# Patient Record
Sex: FEMALE | Race: WHITE | NOT HISPANIC OR LATINO | Employment: OTHER | URBAN - METROPOLITAN AREA
[De-identification: names, ages, dates, MRNs, and addresses within clinical notes are randomized per-mention and may not be internally consistent; named-entity substitution may affect disease eponyms.]

---

## 2017-06-23 ENCOUNTER — ALLSCRIPTS OFFICE VISIT (OUTPATIENT)
Dept: OTHER | Facility: OTHER | Age: 42
End: 2017-06-23

## 2017-06-23 DIAGNOSIS — Z12.31 ENCOUNTER FOR SCREENING MAMMOGRAM FOR MALIGNANT NEOPLASM OF BREAST: ICD-10-CM

## 2017-07-24 ENCOUNTER — ALLSCRIPTS OFFICE VISIT (OUTPATIENT)
Dept: OTHER | Facility: OTHER | Age: 42
End: 2017-07-24

## 2017-07-24 ENCOUNTER — TRANSCRIBE ORDERS (OUTPATIENT)
Dept: ADMINISTRATIVE | Facility: HOSPITAL | Age: 42
End: 2017-07-24

## 2017-07-24 DIAGNOSIS — N63.0 LUMP, BREAST: Primary | ICD-10-CM

## 2017-07-24 DIAGNOSIS — N63.0 BREAST LUMP: ICD-10-CM

## 2017-07-24 DIAGNOSIS — N60.19 DIFFUSE CYSTIC MASTOPATHY OF BREAST: ICD-10-CM

## 2017-07-25 ENCOUNTER — HOSPITAL ENCOUNTER (OUTPATIENT)
Dept: MAMMOGRAPHY | Facility: CLINIC | Age: 42
Discharge: HOME | End: 2017-07-25
Payer: COMMERCIAL

## 2017-07-25 ENCOUNTER — HOSPITAL ENCOUNTER (OUTPATIENT)
Dept: ULTRASOUND IMAGING | Facility: CLINIC | Age: 42
Discharge: HOME | End: 2017-07-25
Payer: COMMERCIAL

## 2017-07-25 DIAGNOSIS — N63.0 LUMP, BREAST: ICD-10-CM

## 2017-07-25 PROCEDURE — 76642 ULTRASOUND BREAST LIMITED: CPT

## 2017-07-25 PROCEDURE — G0279 TOMOSYNTHESIS, MAMMO: HCPCS

## 2017-07-25 PROCEDURE — G0206 DX MAMMO INCL CAD UNI: HCPCS

## 2017-07-26 ENCOUNTER — GENERIC CONVERSION - ENCOUNTER (OUTPATIENT)
Dept: OTHER | Facility: OTHER | Age: 42
End: 2017-07-26

## 2017-08-18 ENCOUNTER — ALLSCRIPTS OFFICE VISIT (OUTPATIENT)
Dept: OTHER | Facility: OTHER | Age: 42
End: 2017-08-18

## 2017-09-18 ENCOUNTER — GENERIC CONVERSION - ENCOUNTER (OUTPATIENT)
Dept: OTHER | Facility: OTHER | Age: 42
End: 2017-09-18

## 2017-10-18 ENCOUNTER — GENERIC CONVERSION - ENCOUNTER (OUTPATIENT)
Dept: OTHER | Facility: OTHER | Age: 42
End: 2017-10-18

## 2017-10-19 ENCOUNTER — GENERIC CONVERSION - ENCOUNTER (OUTPATIENT)
Dept: OTHER | Facility: OTHER | Age: 42
End: 2017-10-19

## 2017-10-25 ENCOUNTER — GENERIC CONVERSION - ENCOUNTER (OUTPATIENT)
Dept: OTHER | Facility: OTHER | Age: 42
End: 2017-10-25

## 2017-10-27 ENCOUNTER — HOSPITAL ENCOUNTER (OUTPATIENT)
Dept: MAMMOGRAPHY | Facility: CLINIC | Age: 42
Discharge: HOME | End: 2017-10-27
Payer: COMMERCIAL

## 2017-10-27 ENCOUNTER — HOSPITAL ENCOUNTER (OUTPATIENT)
Dept: ULTRASOUND IMAGING | Facility: CLINIC | Age: 42
Discharge: HOME | End: 2017-10-27
Payer: COMMERCIAL

## 2017-10-27 DIAGNOSIS — Z12.31 ENCOUNTER FOR SCREENING MAMMOGRAM FOR MALIGNANT NEOPLASM OF BREAST: ICD-10-CM

## 2017-10-27 DIAGNOSIS — N60.19 DIFFUSE CYSTIC MASTOPATHY OF BREAST: ICD-10-CM

## 2017-10-27 PROCEDURE — 77063 BREAST TOMOSYNTHESIS BI: CPT

## 2017-10-27 PROCEDURE — 76641 ULTRASOUND BREAST COMPLETE: CPT

## 2017-10-27 PROCEDURE — G0202 SCR MAMMO BI INCL CAD: HCPCS

## 2017-10-27 PROCEDURE — 76377 3D RENDER W/INTRP POSTPROCES: CPT

## 2017-10-30 ENCOUNTER — GENERIC CONVERSION - ENCOUNTER (OUTPATIENT)
Dept: OTHER | Facility: OTHER | Age: 42
End: 2017-10-30

## 2018-01-09 NOTE — MISCELLANEOUS
Message   Recorded as Task   Date: 10/03/2016 03:40 PM, Created By: Bryan Romero   Task Name: Follow Up   Assigned To: Juan Manuel England   Regarding Patient: Gracy Canseco, Status: Active   CommentShahram Larkin - 03 Oct 2016 3:40 PM     TASK CREATED  Caller: Leslie Vance; [phone]  [facility] imag[facility] called - they need a new rx put in for mammo screening bilateral 3D and the screening dx of z12 31 please fax to [phone] - 03 Oct 2016 3:50 PM     TASK EDITED                 order in allscripts        Active Problems    1  Abnormal mammogram (793 80) (R92 8)   2  Dense breasts (793 82) (R92 2)   3  Encounter for gynecological examination with abnormal finding (V72 31) (Z01 411)   4  Encounter for routine gynecological examination (V72 31) (Z01 419)   5  Encounter for screening mammogram for malignant neoplasm of breast (V76 12)   (Z12 31)   6  Metrorrhagia (626 6) (N92 1)   7  Screening for human papillomavirus (HPV) (V73 81) (Z11 51)   8  Urinary tract infection associated with catheterization of urinary tract, initial encounter   9  Urinary tract infection without hematuria, site unspecified (599 0) (N39 0)   10  Vaginal discharge (623 5) (N89 8)    Current Meds   1  CeleXA TABS (Citalopram Hydrobromide); Therapy: (Recorded:2013) to Recorded    Allergies    1  No Known Drug Allergies    Plan  Encounter for screening mammogram for malignant neoplasm of breast    · MAMMO SCREENING BILATERAL W 3D & CAD; Status:Hold For -  Scheduling,Retrospective By Protocol Authorization;  Requested [id number];     Signatures   Electronically signed by : Aquiles Singh, ; Oct  3 2016  3:50PM EST                       (Author)

## 2018-01-10 NOTE — MISCELLANEOUS
Message   Recorded as Task   Date: 10/30/2017 09:49 AM, Created By: Alexandra Madrid   Task Name: Follow Up   Assigned To: Cyrus Pichardo   Regarding Patient: Zack Reyes, Status: Active   CommentPeashanti Roberson - 30 Oct 2017 9:49 AM     TASK CREATED  inform pt right breast cyst smaller than previous   mammo wnl   Helga Lomas - 30 Oct 2017 11:04 AM     TASK EDITED  Patient is aware of mammo results  Stated they did drain the cyst on Friday but didn't get enough to analyze  She is doing fine now, no pain or discomfort  Active Problems    1  Abnormal mammogram (793 80) (R92 8)   2  Breast mass, right (611 72) (N63 10)   3  Dense breast tissue (793 82) (R92 2)   4  Dense breasts (793 82) (R92 2)   5  Encounter for gynecological examination with abnormal finding (V72 31) (Z01 411)   6  Encounter for routine gynecological examination (V72 31) (Z01 419)   7  Encounter for screening mammogram for malignant neoplasm of breast (V76 12)   (Z12 31)   8  Fibrocystic breast (610 1) (N60 19)   9  Metrorrhagia (626 6) (N92 1)   10  Screening for human papillomavirus (HPV) (V73 81) (Z11 51)   11  Urinary tract infection associated with catheterization of urinary tract, initial encounter    (996 64,599 0) (T83 511A,N39 0)   12  Urinary tract infection without hematuria, site unspecified (599 0) (N39 0)   13  Vaginal discharge (623 5) (N89 8)    Current Meds   1  CeleXA TABS (Citalopram Hydrobromide); Therapy: (Recorded:11Jun2013) to Recorded    Allergies    1   No Known Drug Allergies    Signatures   Electronically signed by : Shonna Devine, ; Oct 30 2017 11:04AM EST                       (Author)

## 2018-01-12 NOTE — MISCELLANEOUS
Message   Recorded as Task   Date: 09/30/2016 09:35 AM, Created By: Mike Byrnes   Task Name: Hospital Call   Assigned To: Jojo Melendez   Regarding Patient: Blayne Ramsey, Status: In Progress   Sidra Haylie - 30 Sep 2016 9:35 AM     TASK CREATED  Caller: andres, Other; Hospital Call; [phone]  needs 3d mammo diagnosis changed to screening code z12 31       [phone] fax   Mike Byrnes - 30 Sep 2016 2:37 PM     TASK REASSIGNED: Previously Assigned To Chaparro Carlos - 30 Sep 2016 2:58 PM     TASK IN [address] [facility] [facility] - 30 Sep 2016 3:08 PM     TASK EDITED                 changed code  according to zachary kapadia called and stated it should have a screening code  Active Problems    1  Abnormal mammogram (793 80) (R92 8)   2  Dense breasts (793 82) (R92 2)   3  Encounter for gynecological examination with abnormal finding (V72 31) (Z01 411)   4  Encounter for routine gynecological examination (V72 31) (Z01 419)   5  Encounter for screening mammogram for malignant neoplasm of breast (V76 12)   (Z12 31)   6  Metrorrhagia (626 6) (N92 1)   7  Screening for human papillomavirus (HPV) (V73 81) (Z11 51)   8  Urinary tract infection associated with catheterization of urinary tract, initial encounter   9  Urinary tract infection without hematuria, site unspecified (599 0) (N39 0)   10  Vaginal discharge (623 5) (N89 8)    Current Meds   1  CeleXA TABS (Citalopram Hydrobromide); Therapy: (Recorded:11Jun2013) to Recorded    Allergies    1  No Known Drug Allergies    Plan  Abnormal mammogram    · MAMMO DIAGNOSTIC LEFT W CAD; Status:Canceled - Retrospective By Protocol  Authorization;   Encounter for screening mammogram for malignant neoplasm of breast    · MAMMO DIAGNOSTIC LEFT W 3D & CAD; Status:Active - Retrospective By Protocol  Authorization;  Requested ONE:[id number];     Signatures   Electronically signed by : Alia Ceballos LPN; Sep 30 1898  2:39XH EST (Author)

## 2018-01-13 VITALS
SYSTOLIC BLOOD PRESSURE: 138 MMHG | BODY MASS INDEX: 25.34 KG/M2 | HEIGHT: 63 IN | DIASTOLIC BLOOD PRESSURE: 84 MMHG | WEIGHT: 143 LBS

## 2018-01-13 NOTE — MISCELLANEOUS
Message   Recorded as Task   Date: 09/18/2017 08:38 AM, Created By: Manda Preciado   Task Name: Follow Up   Assigned To: Elio Mtz   Regarding Patient: Gracy Canseco, Status: In Progress   Genaro Harris - 18 Sep 2017 8:38 AM     TASK CREATED  inform pt breast cyst contained only fluid   no abnormal cells noted   Nell Lomassten - 18 Sep 2017 8:44 AM     TASK IN PROGRESS   Helga Lomas - 18 Sep 2017 8:45 AM     TASK EDITED  Patient is aware of Breast cyst results  Active Problems    1  Abnormal mammogram (793 80) (R92 8)   2  Breast mass, right (611 72) (N63)   3  Dense breasts (793 82) (R92 2)   4  Encounter for gynecological examination with abnormal finding (V72 31) (Z01 411)   5  Encounter for routine gynecological examination (V72 31) (Z01 419)   6  Encounter for screening mammogram for malignant neoplasm of breast (V76 12)   (Z12 31)   7  Fibrocystic breast (610 1) (N60 19)   8  Metrorrhagia (626 6) (N92 1)   9  Screening for human papillomavirus (HPV) (V73 81) (Z11 51)   10  Urinary tract infection associated with catheterization of urinary tract, initial encounter    (996 64,599 0) (T83 511A,N39 0)   11  Urinary tract infection without hematuria, site unspecified (599 0) (N39 0)   12  Vaginal discharge (623 5) (N89 8)    Current Meds   1  CeleXA TABS (Citalopram Hydrobromide); Therapy: (Recorded:11Jun2013) to Recorded    Allergies    1   No Known Drug Allergies    Signatures   Electronically signed by : Laura Payne, ; Sep 18 2017  8:45AM EST                       (Author)

## 2018-01-14 VITALS
SYSTOLIC BLOOD PRESSURE: 128 MMHG | WEIGHT: 144.8 LBS | DIASTOLIC BLOOD PRESSURE: 74 MMHG | BODY MASS INDEX: 25.66 KG/M2 | HEIGHT: 63 IN

## 2018-01-15 VITALS
DIASTOLIC BLOOD PRESSURE: 72 MMHG | BODY MASS INDEX: 25.83 KG/M2 | SYSTOLIC BLOOD PRESSURE: 114 MMHG | HEIGHT: 63 IN | WEIGHT: 145.8 LBS

## 2018-01-15 NOTE — MISCELLANEOUS
Message   Recorded as Task   Date: 08/01/2017 01:12 PM, Created By: Arturo Head   Task Name: Care Coordination   Assigned To: Beni Nicole   Regarding Patient: Severa Linea, Status: In Progress   Comment:    Nancy Squires - 01 Aug 2017 1:12 PM     TASK CREATED  Caller: Self; Care Coordination; [phone] (Home)  pt was seen last wk by Dr Joel Oglesby pd her $160 00 copay  Her ins  is telling her  that every other doctor in our practice is covered except for Dr Jhonatan Guzman  ??pls call @ [phone]  Samir Tabor - 01 Aug 2017 5:12 PM     TASK EDITED  pt called again for update - she's concerned due to she's due in for a OVL with Jhonatan Guzman for draining of the cyst - insurance told her we owe her the balance of the last visit - she said that they WILL now pay the 6/23 visit (before they told her it was all her to pay the 205 due to Jhonatan Guzman out of network) - she also mentioned the Mammo he sent her for - for the lump - she has to pay $500 due to coded incorrectly (as per her insurance) - explained that this wasn't her routine mammo & the code was for the issue she has  Candida Pan - 04 Aug 2017 2:33 PM     TASK EDITED  Spoke to patient today she is aware that we are working on the credentialing aspect with this insurance  I have advised her to keep her upcoming appt  I will continue to have [facility] reach out to credentialing dept and I will reach out to billing to have claims resubmitted once credentialing issue is done  Shena Goodman - 14 Aug 2017 11:27 AM     TASK IN PROGRESS   Candida Pan - 18 Aug 2017 10:09 AM     TASK EDITED  Spoke to patient today and she is aware that they will process as no coverage out of network  Spoke to [facility] as well and this is a credentialing issue and they are working on resolving  Let patient know to call me each time she receives a bill so that we can tell them to hold off sending to collections      Do not charge her copay as this is a high copay and it gets prorated  recheck this in a few weeks   Shena Goodman - 13 Sep 2017 4:55 PM     TASK IN PROGRESS   DanieljimHugoEan - 15 Sep 2017 1:23 PM     TASK EDITED  Patient called today and explained that her insurance company sent her a letter stating that she may owe less on her mammo bill  They processed claim incorrectly  Called billing Soledad Alicea) to make sure this does not go to collections and she informed me that if insurance is working on it she will be fine and she is not in danger of going to collections for two months yet  Shena Goodman - 15 Sep 2017 1:24 PM     TASK EDITED  account [de-identified]   KędzialirioKateesteban - 13 Oct 2017 1:57 PM     TASK EDITED  Can she wait until January instead of September then it will be covered in full?  need to ask Dr Wood July - 17 Oct 2017 8:14 AM     TASK EDITED  Copay is all that is left for the diagnostic portion   135 00   Shena Goodman - 17 Oct 2017 12:01 PM     TASK EDITED  Patient advised and did receive that bill  Dr Susana Arteaga also advised not waiting on the Mammogram   She then had a question regarding both breasts or just the Left as she just had the right done and it was normal   She is questioning because she does not want to get a bill for it if she already had it done and insurance denies it  Can they do a screening on just one side? Shena Goodman - 17 Oct 2017 12:03 PM     TASK REASSIGNED: Previously Assigned To Shena Goodman  see task below regarding Mammo and Dr Susana Arteaga    KęJoan - 17 Oct 2017 12:59 PM     TASK REASSIGNED: Previously Assigned To SLOGA GYN,Team  Recommendation is Elyce Primrose - 19 Oct 2017 2:52 PM     TASK EDITED  Would someone be able to call her and let her know that Dr Susana Arteaga said The recommendation is for both breasts  [facility] Just in case she has any questions as to why  She seems to have a lot of questions     Helga Lomas - 19 Oct 2017 3:06 PM     TASK EDITED  Patient is aware that she needs to have both breast scanned - she was [facility] by [facility]  She understands  Active Problems    1  Abnormal mammogram (793 80) (R92 8)   2  Breast mass, right (611 72) (N63 10)   3  Dense breasts (793 82) (R92 2)   4  Encounter for gynecological examination with abnormal finding (V72 31) (Z01 411)   5  Encounter for routine gynecological examination (V72 31) (Z01 419)   6  Encounter for screening mammogram for malignant neoplasm of breast (V76 12)   (Z12 31)   7  Fibrocystic breast (610 1) (N60 19)   8  Metrorrhagia (626 6) (N92 1)   9  Screening for human papillomavirus (HPV) (V73 81) (Z11 51)   10  Urinary tract infection associated with catheterization of urinary tract, initial encounter    (996 64,599 0) (T83 511A,N39 0)   11  Urinary tract infection without hematuria, site unspecified (599 0) (N39 0)   12  Vaginal discharge (623 5) (N89 8)    Current Meds   1  CeleXA TABS (Citalopram Hydrobromide); Therapy: (Recorded:62Sxo0034) to Recorded    Allergies    1   No Known Drug Allergies    Signatures   Electronically signed by : Darcy Wang, ; Oct 19 2017  3:07PM EST                       (Author)

## 2018-01-15 NOTE — MISCELLANEOUS
Message  Reviewed breast u/s with pt  Offered aspiration  Friday August 18 at [facility]  Results/Data     *US BREAST RIGHT LIMITED (DIAGNOSTIC)   US BREAST RIGHT LIMITED: Patient History:   Family history of prostate cancer at age 61 in father, unknown    cancer at age 48 in paternal aunt  Took hormonal contraceptives for 2 years  Patient has never smoked  Patient's BMI is 22 3  Reason for exam: clinical finding  Mammo Diagnostic Right W DBT and CAD: July 25, 2017 - Check In #:   [de-identified]   2D/3D Procedure   3D views: MLO view(s) were taken of the right breast    2D views: CC and MLO view(s) were taken of the right breast        Technologist: MICHAEL Malloy (MICHAEL)(M)   Prior study comparison: September 30, 2016, mammo diagnostic left   W CAD performed at [facility]  September 29, 2016, mammo screening bilateral W DBT and CAD, performed at    [place] [address]  September 28, 2015,    bilateral [address]  [facility] digtl scrn mammo w/CAD, performed at [address]  The breast tissue is heterogeneously dense, potentially limiting    the sensitivity of mammography  Patient risk, included in this    report, assists in determining the appropriate screening regimen    (such as 3-D mammography or the inclusion of automated breast    ultrasound or MRI)  3-D mammography may also remain indicated as    screening  US Breast Right Limited: July 25, 2017 - Check In #: [de-identified]   Standard views  Technologist: Cheyenne Molina RDMS   Heterogeneity can be either focal or diffuse  The breast    echotexture is characterized by multiple small areas of increased   and decreased echogenicity  Shadowing may occur at the    interfaces of the fat lobules and parenchyma  This pattern occurs   in younger breasts and those with heterogeneously dense    parenchyma depicted mammographically    Prior to Imaging, a    radiopaque marker was applied to the skin of the upper outer    right breast in the area of palpable abnormality, as indicated by   the patient  There is a circumscribed 3 5 cm nodule in the upper outer right    breast, 8 cm from the nipple, in the area of palpable    abnormality  This was seen on prior studies, but appears    slightly larger at this time  The parenchymal pattern is otherwise stable  There are no    suspicious microcalcifications  There is no evidence of    architectural distortion or skin thickening  The right axillary    region is benign  Following diagnostic mammography, the patient was taken to the    ultrasound suite  RIGHT BREAST ULTRASOUND:     Grayscale sonography of the upper outer right breast in the area    of palpable abnormality was performed in multiple projections    using real time imaging  At the 10 o'clock position, 17 cm from the nipple, there is an    anechoic well-defined nodule, measuring 3 1 x 1 6 x 2 5 cm  There is posterior enhancement with no internal vascularity and    the appearance is consistent with a simple cyst   This    corresponds in size, shape and location to the nodule seen on    mammography  This cyst was identified in 2015, at which time it    measured 2 5 x 2 6 x 1 cm  No solid nodules are identified  1   In the area of palpable abnormality, a simple cyst is    identified which requires no further follow-up  2   Stable right mammogram    3   Management of any clinical symptoms and findings must be    based on clinical grounds  4   The patient is due for her bilateral mammogram in September 2017  ACR BI-RADSÃ¯Â¾Â® Assessments: BiRad:2 - Benign (Overall)   Right breast Right Diag Mamm: BiRad:2 - benign finding in the    right breast    Right breast Right Brst US: BiRad:2 - benign finding in the right   breast      Recommendation:   Return to routine screening mammogram schedule  The patient is scheduled in a reminder system  Transcription Location: MICHAEL Harris 98: [id number]     Risk Value(s):   Tyrer-Cuzick 10 Year: 1 500%, Tyrer-Cuzick Lifetime: 11 100%,    Myriad Table: 1 5%, SAVANNAH 5 Year: 0 6%, NCI Lifetime: 10 1%   Signed by:   Te Paiz MD   7/25/17     Signatures   Electronically signed by : Shanti Rosado DO; Jul 26 2017  4:52PM EST                       (Author)

## 2018-01-16 NOTE — MISCELLANEOUS
Message  Pt has f/u u/s and mammo of both breasts this week  Not feeling any masses  Will await results        Signatures   Electronically signed by : Amelia Peterson DO; Oct 25 2017  2:48PM EST                       (Author)

## 2018-01-17 NOTE — MISCELLANEOUS
Message   Recorded as Task   Date: 10/17/2017 04:48 PM, Created By: Dwight Harman   Task Name: Follow Up   Assigned To: Matthew Pace   Regarding Patient: Nathanael House, Status: In Progress   Pamela Pan - 17 Oct 2017 4:48 PM     TASK CREATED  Caller: Self; General Medical Question; [phone] (Home)  Pt asked to speak w you regarding mammo of L breast   Pt states that her ins only covers 1 mammo per year and she wants to know if this can wait   or is it medically necessary  Harrietta December - 12 Oct 2017 1:27 PM     TASK REASSIGNED: Previously Assigned To Nobleta December  can you call this pt? I am not sure what to tell her     thanks   Shona Dear - 18 Oct 2017 2:01 PM     TASK [address] - 18 Oct 2017 2:16 PM     TASK EDITED  Pt told ins should pay for bilat screening mammo  Pt to look into abus - entirely different u/s  Not a diag u/s  Pt referred to [place] if more questions  Pt does have dense breasts        Active Problems    1  Abnormal mammogram (793 80) (R92 8)   2  Breast mass, right (611 72) (N63 10)   3  Dense breasts (793 82) (R92 2)   4  Encounter for gynecological examination with abnormal finding (V72 31) (Z01 411)   5  Encounter for routine gynecological examination (V72 31) (Z01 419)   6  Encounter for screening mammogram for malignant neoplasm of breast (V76 12)   (Z12 31)   7  Fibrocystic breast (610 1) (N60 19)   8  Metrorrhagia (626 6) (N92 1)   9  Screening for human papillomavirus (HPV) (V73 81) (Z11 51)   10  Urinary tract infection associated with catheterization of urinary tract, initial encounter    (996 64,599 0) (T83 511A,N39 0)   11  Urinary tract infection without hematuria, site unspecified (599 0) (N39 0)   12  Vaginal discharge (623 5) (N89 8)    Current Meds   1  CeleXA TABS (Citalopram Hydrobromide); Therapy: (Recorded:11Jun2013) to Recorded    Allergies    1   No Known Drug Allergies    Signatures   Electronically signed by Patrick Mcdonald, ; Oct 18 2017  2:17PM EST                       (Author)

## 2018-02-16 ENCOUNTER — TELEPHONE (OUTPATIENT)
Dept: OBGYN CLINIC | Facility: CLINIC | Age: 43
End: 2018-02-16

## 2018-02-16 NOTE — TELEPHONE ENCOUNTER
Pt left a message in regards to a bill she received for a mammo bill stated she has out of network benefits dr Geraldine Hartmann is not participating please advise

## 2018-03-22 NOTE — TELEPHONE ENCOUNTER
Spoke with Andrea Milligan in [employer], insurance has a $917 copay for diagnostic mammograms   I am waiting for patient to call to discuss further

## 2018-04-10 NOTE — TELEPHONE ENCOUNTER
Spoke with patient, reviewed copay information, she understands and will wait to speak with Chadd Gaming about other charges

## 2018-04-10 NOTE — TELEPHONE ENCOUNTER
Also If you could relay this information to her, and I am still working on her other billing problems with the credentialing    This is a true copay

## 2018-07-09 ENCOUNTER — ANNUAL EXAM (OUTPATIENT)
Dept: OBGYN CLINIC | Facility: CLINIC | Age: 43
End: 2018-07-09
Payer: COMMERCIAL

## 2018-07-09 VITALS
SYSTOLIC BLOOD PRESSURE: 96 MMHG | BODY MASS INDEX: 24.45 KG/M2 | DIASTOLIC BLOOD PRESSURE: 62 MMHG | HEIGHT: 63 IN | WEIGHT: 138 LBS

## 2018-07-09 DIAGNOSIS — N60.01 BILATERAL BREAST CYSTS: ICD-10-CM

## 2018-07-09 DIAGNOSIS — Z12.31 ENCOUNTER FOR SCREENING MAMMOGRAM FOR MALIGNANT NEOPLASM OF BREAST: ICD-10-CM

## 2018-07-09 DIAGNOSIS — N60.02 BILATERAL BREAST CYSTS: ICD-10-CM

## 2018-07-09 DIAGNOSIS — Z01.419 ENCOUNTER FOR GYNECOLOGICAL EXAMINATION WITHOUT ABNORMAL FINDING: Primary | ICD-10-CM

## 2018-07-09 PROCEDURE — S0612 ANNUAL GYNECOLOGICAL EXAMINA: HCPCS | Performed by: OBSTETRICS & GYNECOLOGY

## 2018-07-09 RX ORDER — DOXYCYCLINE HYCLATE 50 MG/1
324 CAPSULE, GELATIN COATED ORAL
COMMUNITY
End: 2019-04-07

## 2018-07-09 RX ORDER — ARIPIPRAZOLE 2 MG/1
2 TABLET ORAL DAILY
Refills: 1 | COMMUNITY
Start: 2018-06-15 | End: 2019-01-18 | Stop reason: ALTCHOICE

## 2018-07-09 RX ORDER — CITALOPRAM 40 MG/1
40 TABLET ORAL
Refills: 1 | COMMUNITY
Start: 2018-06-02

## 2018-07-09 NOTE — PROGRESS NOTES
Assessment/Plan:    No problem-specific Assessment & Plan notes found for this encounter  Diagnoses and all orders for this visit:    Encounter for screening mammogram for malignant neoplasm of breast  -     Mammo screening bilateral w 3d & cad; Future    Bilateral breast cysts  -     US breast screening bilateral complete (ABUS); Future    Encounter for gynecological examination without abnormal finding    Other orders  -     ABILIFY 2 MG tablet; Take 2 mg by mouth daily  -     citalopram (CeleXA) 40 mg tablet; Take 40 mg by mouth daily  -     ferrous gluconate (FERGON) 324 mg tablet; Take 324 mg by mouth daily with breakfast        Annual examination was completed  3D mammography as well as bilateral breast ultrasound were ordered given her previous cystic breast changes  Patient will utilize Unisom and melatonin for her sleep habits  I have asked her to call if she has not seen benefit in this next month as I will gladly prescribed Ambien to help  I have suggested that she utilize naproxen prior to the onset of menses to help decrease flow  Patient otherwise return to the office in 1 year or as necessary  Subjective:      Patient ID: Jeet Andrade is a 43 y o  female  Patient returns for annual gyn visit  She has no specific new medical surgical issues  She continues with some slightly heavy periods  She is also bothered by some insomnia  She had recent tragedy in a relationship and that her fiance  from metastatic melanoma 2 and half months ago  With the exception of insomnia she appears to be recovering well  Gynecologic Exam         The following portions of the patient's history were reviewed and updated as appropriate:   She  has no past medical history on file    She   Patient Active Problem List    Diagnosis Date Noted    Encounter for screening mammogram for malignant neoplasm of breast 2018    Bilateral breast cysts 2018     She  has no past surgical history on file  Her family history is not on file  She  reports that she has never smoked  She has never used smokeless tobacco  She reports that she drinks alcohol  She reports that she does not use drugs  Current Outpatient Prescriptions   Medication Sig Dispense Refill    ferrous gluconate (FERGON) 324 mg tablet Take 324 mg by mouth daily with breakfast      ABILIFY 2 MG tablet Take 2 mg by mouth daily  1    citalopram (CeleXA) 40 mg tablet Take 40 mg by mouth daily  1     No current facility-administered medications for this visit  No current outpatient prescriptions on file prior to visit  No current facility-administered medications on file prior to visit  She has No Known Allergies       Review of Systems   All other systems reviewed and are negative  Objective:      BP 96/62 (BP Location: Left arm, Patient Position: Sitting, Cuff Size: Standard)   Ht 5' 2 5" (1 588 m)   Wt 62 6 kg (138 lb)   LMP 07/05/2018   BMI 24 84 kg/m²          Physical Exam   Constitutional: She is oriented to person, place, and time  She appears well-developed and well-nourished  HENT:   Head: Normocephalic and atraumatic  Nose: Nose normal    Eyes: EOM are normal  Pupils are equal, round, and reactive to light  Neck: Normal range of motion  Neck supple  No thyromegaly present  Cardiovascular: Normal rate and regular rhythm  Pulmonary/Chest: Effort normal and breath sounds normal  No respiratory distress  Breasts no masses, tenderness, skin changes; stable cyst at 9-10 on right   Abdominal: Soft  Bowel sounds are normal  She exhibits no distension and no mass  There is no tenderness  Hernia confirmed negative in the right inguinal area and confirmed negative in the left inguinal area  Genitourinary: Vagina normal and uterus normal  No breast swelling, tenderness, discharge or bleeding  Pelvic exam was performed with patient supine  No labial fusion   There is no rash, tenderness, lesion or injury on the right labia  There is no rash, tenderness, lesion or injury on the left labia  Cervix exhibits no motion tenderness, no discharge and no friability  Genitourinary Comments: Ext genitalia nl female w/o lesions  Vag healthy without lesions or discharge  Cervix healthy w/o lesions or discharge  uterus nl size, NT, no mass  Adnexa NT, no mass   Musculoskeletal: Normal range of motion  She exhibits no edema  Lymphadenopathy:        Right: No inguinal adenopathy present  Left: No inguinal adenopathy present  Neurological: She is alert and oriented to person, place, and time  She has normal reflexes  Skin: Skin is warm and dry  No rash noted  Psychiatric: She has a normal mood and affect  Her behavior is normal  Thought content normal    Nursing note and vitals reviewed

## 2018-07-25 ENCOUNTER — TELEPHONE (OUTPATIENT)
Dept: OBGYN CLINIC | Facility: CLINIC | Age: 43
End: 2018-07-25

## 2018-07-25 DIAGNOSIS — F51.01 PRIMARY INSOMNIA: Primary | ICD-10-CM

## 2018-07-25 RX ORDER — ZOLPIDEM TARTRATE 5 MG/1
5 TABLET ORAL
Qty: 30 TABLET | Refills: 1 | Status: SHIPPED | OUTPATIENT
Start: 2018-07-25 | End: 2019-04-07

## 2018-07-25 NOTE — TELEPHONE ENCOUNTER
She saw Dr Juanito Hdz a month ago and spoke to him regarding medication for anxiety and sleep and told her to call back if not working and he would speak to her  She would like him to call her

## 2018-07-25 NOTE — TELEPHONE ENCOUNTER
Dear Dr Thorne Comment:    I apologize with regards to mentioning psychotropic medication, please disregard  Pt was recommended to take Unisom and Melatonin at her last visit with you on 7/9/18  Pt states she tried both medications, however, it did not relieve her insomnia  Pt further states that if said medications did not work, you would prescribe Ambien  Pt requests to speak with you directly  Please advise  Thank you!     Maribel Cornejo MA

## 2018-07-25 NOTE — TELEPHONE ENCOUNTER
Dear Dr Prakash Shah:    Pt called office today complaining that prescribed psychotropic medication is not working for her  Pt was prescribed Abilify 2 mg on 6/15/18  Pt states she is still unable to sleep after her fiancee's death  Pt further states she wants to speak with you directly  Pt can be reached @ [phone]  Pt is aware that she may receive a response form you tomorrow  Pt denies suicidal ideations at this time  Please advise  Thank you!     Prince Jose MA

## 2018-07-25 NOTE — PROGRESS NOTES
Spoke w/ pt  Persists w/ insomnia  Feels worse with this since starting Abilify  Desires ambien  [address] sent  Will wean Abilify qod for next month  To call if no change next 10 days

## 2018-07-25 NOTE — TELEPHONE ENCOUNTER
I did not prescribe her meds  It would be best for her to speak with the prescribing doctor  I have a full day in L&D tomorrow

## 2018-08-27 ENCOUNTER — TELEPHONE (OUTPATIENT)
Dept: OBGYN CLINIC | Facility: CLINIC | Age: 43
End: 2018-08-27

## 2018-08-28 NOTE — TELEPHONE ENCOUNTER
I received a call from father of patient Terry Quinonez  He is not on her consent form  He was asking to speak with Dr Marianela Maldonado  He stated that she is a bit of a mess right now over an upcoming mammogram  He says she is a bit of a hypochondriac  She is stressing about the mammogram which is scheduled in 2 months  He wants her to come in for a breast exam so you can tell her that everything is fine  I told him there are currently no openings for this  He says this would help with her current situation  He thinks she needs to see a psychologist or  and is asking for a recommendation  He also states that she has a very high deductible that is hard to pay  $7,500  I told him you are not in the office today but we will get back to [facility] when you respond  He says you have called her in the past  He says she works as a  and has not showered  He is concerned about her   Please advise

## 2018-08-29 ENCOUNTER — TELEPHONE (OUTPATIENT)
Dept: OBGYN CLINIC | Facility: CLINIC | Age: 43
End: 2018-08-29

## 2018-08-29 NOTE — TELEPHONE ENCOUNTER
UBALDO CALLED STATING THAT SHE HAS BEEN CALLING TO SPEAK WITH DR Brittany Callahan    SAYS SHE NEEDS TO SPEAK WITH HIM TO HAVE HER MIND SET   SHE WOULD LIKE TO SEE HIM, TOLD HER HE DOESN'T HAVE AVAILABILITY UNTIL October, SAID SHE CAN'T WAIT THAT LONG    WANTED TO SEE A NURSE    TOLD HER THEY DO NOT EXAMINE PATIENTS    SHE STATES SHE IS STRESSED AND THAT DR Brittany Callahan KNOWS HOW SHE CAN GET AND DR Brittany Callahan TOLD HER TO CALL HIM ANYTIME SHE NEEDED TO AND HE WOULD SPEAK WITH HER    PLEASE ADVISE

## 2018-09-05 ENCOUNTER — TELEPHONE (OUTPATIENT)
Dept: OBGYN CLINIC | Facility: CLINIC | Age: 43
End: 2018-09-05

## 2018-09-05 NOTE — TELEPHONE ENCOUNTER
Patient cancelled appt for today,states child sick  I offered to reschedule appt due patient c/o breast pain, but she stated she is fine now

## 2018-09-20 ENCOUNTER — TELEPHONE (OUTPATIENT)
Dept: OBGYN CLINIC | Facility: CLINIC | Age: 43
End: 2018-09-20

## 2018-09-20 NOTE — TELEPHONE ENCOUNTER
Spoke with pt ins wont cover her abus test for screening   Advised pt to go get her jessy done and if indicated rk will order a diag us of breast if need be

## 2018-09-25 ENCOUNTER — TELEPHONE (OUTPATIENT)
Dept: OBGYN CLINIC | Facility: CLINIC | Age: 43
End: 2018-09-25

## 2018-09-25 DIAGNOSIS — R92.2 DENSE BREAST TISSUE: Primary | ICD-10-CM

## 2019-01-17 ENCOUNTER — TELEPHONE (OUTPATIENT)
Dept: OBGYN CLINIC | Facility: CLINIC | Age: 44
End: 2019-01-17

## 2019-01-18 ENCOUNTER — TRANSCRIBE ORDERS (OUTPATIENT)
Dept: RADIOLOGY | Facility: CLINIC | Age: 44
End: 2019-01-18

## 2019-01-18 ENCOUNTER — HOSPITAL ENCOUNTER (OUTPATIENT)
Dept: ULTRASOUND IMAGING | Facility: CLINIC | Age: 44
Discharge: HOME | End: 2019-01-18
Payer: COMMERCIAL

## 2019-01-18 ENCOUNTER — OFFICE VISIT (OUTPATIENT)
Dept: OBGYN CLINIC | Facility: MEDICAL CENTER | Age: 44
End: 2019-01-18
Payer: COMMERCIAL

## 2019-01-18 VITALS — WEIGHT: 145 LBS | BODY MASS INDEX: 26.1 KG/M2 | DIASTOLIC BLOOD PRESSURE: 80 MMHG | SYSTOLIC BLOOD PRESSURE: 120 MMHG

## 2019-01-18 DIAGNOSIS — R10.2 PELVIC PAIN: ICD-10-CM

## 2019-01-18 DIAGNOSIS — R10.2 PELVIC PAIN: Primary | ICD-10-CM

## 2019-01-18 PROCEDURE — 99213 OFFICE O/P EST LOW 20 MIN: CPT | Performed by: PHYSICIAN ASSISTANT

## 2019-01-18 PROCEDURE — 76830 TRANSVAGINAL US NON-OB: CPT

## 2019-01-18 PROCEDURE — 76856 US EXAM PELVIC COMPLETE: CPT

## 2019-01-18 RX ORDER — BUPROPION HYDROCHLORIDE 300 MG/1
TABLET ORAL
COMMUNITY
Start: 2019-01-16 | End: 2020-09-21 | Stop reason: ALTCHOICE

## 2019-01-18 NOTE — PROGRESS NOTES
Assessment/Plan:    Pelvic pain  Will check imaging   Suspect likely d/t scar tissue/adhesions - discussed potential tx options including pain management or surgical intervention  Will await imaging results to determine how pt wishes to proceed       Diagnoses and all orders for this visit:    Pelvic pain  -     US pelvis complete w transvaginal; Future    Other orders  -     buPROPion (WELLBUTRIN XL) 300 mg 24 hr tablet;         Subjective:      Patient ID: Ken Bronson is a 37 y o  female  Pt presents for eval of pelvic/abd pain  States that last week, started w/ pain along  incision site  H/o  10 yrs ago, no complications   States pain feels like a 'burning' pain - made worse w/ IC  No irreg bleeding, vag d/c   Sometimes notes she feels pain in LLQ - feels may be assoc w/ ovulation  No menstrual irregularities, cycles approx q33 days  Monogamous w/ , reports no risk of STDs          The following portions of the patient's history were reviewed and updated as appropriate: allergies, current medications, past family history, past medical history, past social history, past surgical history and problem list     Review of Systems   Constitutional: Negative for appetite change, fatigue and unexpected weight change  Respiratory: Negative for chest tightness and shortness of breath  Cardiovascular: Negative for chest pain, palpitations and leg swelling  Gastrointestinal: Negative for abdominal pain, constipation, diarrhea, nausea and vomiting  Genitourinary: Positive for pelvic pain  Negative for difficulty urinating, dyspareunia, menstrual problem and vaginal discharge  Musculoskeletal: Negative for arthralgias and myalgias  Neurological: Negative for dizziness, light-headedness and headaches  Psychiatric/Behavioral: Negative for dysphoric mood  The patient is not nervous/anxious  All other systems reviewed and are negative          Objective:      /80   Wt 65 8 kg (145 lb)   LMP 12/25/2018   Breastfeeding? No   BMI 26 10 kg/m²          Physical Exam   Constitutional: She is oriented to person, place, and time  She appears well-developed and well-nourished  HENT:   Head: Normocephalic and atraumatic  Abdominal: Soft  There is no tenderness  Hernia confirmed negative in the right inguinal area and confirmed negative in the left inguinal area  Genitourinary: Vagina normal and uterus normal  Pelvic exam was performed with patient supine  There is no rash, tenderness, lesion or injury on the right labia  There is no rash, tenderness, lesion or injury on the left labia  Cervix exhibits no motion tenderness, no discharge and no friability  Right adnexum displays no mass, no tenderness and no fullness  Left adnexum displays no mass, no tenderness and no fullness  No erythema, tenderness or bleeding in the vagina  No signs of injury around the vagina  No vaginal discharge found  Neurological: She is alert and oriented to person, place, and time  Skin: Skin is warm and dry  Psychiatric: She has a normal mood and affect  Nursing note and vitals reviewed

## 2019-01-18 NOTE — ASSESSMENT & PLAN NOTE
Will check imaging   Suspect likely d/t scar tissue/adhesions - discussed potential tx options including pain management or surgical intervention  Will await imaging results to determine how pt wishes to proceed

## 2019-01-22 ENCOUNTER — TELEPHONE (OUTPATIENT)
Dept: OBGYN CLINIC | Facility: CLINIC | Age: 44
End: 2019-01-22

## 2019-01-22 NOTE — TELEPHONE ENCOUNTER
Patient is aware of her pelvic u/s results  States she had no pain since she had the u/s  Advised if she does have it again to call back to make an appointment with an MD/DO

## 2019-01-22 NOTE — TELEPHONE ENCOUNTER
----- Message from Pura Robledo PA-C sent at 1/22/2019 10:44 AM EST -----  Normal pelvic US  If pain persists, rec f/u with physician to discuss further as may be d/t scar tissue/adhesions  thanks

## 2019-03-12 ENCOUNTER — TELEPHONE (OUTPATIENT)
Dept: OBGYN CLINIC | Age: 44
End: 2019-03-12

## 2019-03-12 NOTE — TELEPHONE ENCOUNTER
Patient had one episode of spotting this month  Had unprotected intercourse during ovulation  Thought this may be implantation bleeding  Concerned as she is taking Celexa and Wellbutrin  Due for menses this weekend  Advised to wait until missed cycle  If UPT positive to call and I will address with physician

## 2019-03-18 ENCOUNTER — TELEPHONE (OUTPATIENT)
Dept: OBGYN CLINIC | Age: 44
End: 2019-03-18

## 2019-03-18 ENCOUNTER — APPOINTMENT (OUTPATIENT)
Dept: LAB | Facility: CLINIC | Age: 44
End: 2019-03-18
Payer: COMMERCIAL

## 2019-03-18 DIAGNOSIS — N91.2 AMENORRHEA: ICD-10-CM

## 2019-03-18 DIAGNOSIS — N91.2 AMENORRHEA: Primary | ICD-10-CM

## 2019-03-18 PROCEDURE — 84702 CHORIONIC GONADOTROPIN TEST: CPT

## 2019-03-18 PROCEDURE — 36415 COLL VENOUS BLD VENIPUNCTURE: CPT

## 2019-03-18 NOTE — TELEPHONE ENCOUNTER
Pts lmp 2/16  Had a few pos hpts over weekend  Had unprotected sex few weks ago  Pt on Celexa 40 mg and Wellbutrin 300 xl  Not on ambien  Pt has had 2 healthy pregnancies and 2 miscarriages in pas  KENNA  Was told by you to call if she gets pregnant  She says she is ok - I read her hx    Thanks

## 2019-03-18 NOTE — TELEPHONE ENCOUNTER
Pt called stating she is expecting and was told by Dr Monique Mccullough that when she is expecting to call us and he will see her right   She is taking anxiety medication and has a had 2 miscarriages in the past  LMP: 2 16 19

## 2019-03-19 ENCOUNTER — TELEPHONE (OUTPATIENT)
Dept: OBGYN CLINIC | Facility: CLINIC | Age: 44
End: 2019-03-19

## 2019-03-19 DIAGNOSIS — O20.9 BLEEDING IN EARLY PREGNANCY: Primary | ICD-10-CM

## 2019-03-19 LAB — B-HCG SERPL-ACNC: 82 MIU/ML

## 2019-03-19 NOTE — TELEPHONE ENCOUNTER
Patient returned call - she is aware of her HCG results and that she needs to repeat it  Order in pt chart

## 2019-03-19 NOTE — TELEPHONE ENCOUNTER
----- Message from Isidra Muñoz DO sent at 3/19/2019  8:49 AM EDT -----  Have patient repeat hcg 3 days from previous

## 2019-03-20 ENCOUNTER — TELEPHONE (OUTPATIENT)
Dept: OBGYN CLINIC | Facility: CLINIC | Age: 44
End: 2019-03-20

## 2019-03-20 DIAGNOSIS — N91.2 AMENORRHEA: Primary | ICD-10-CM

## 2019-03-20 NOTE — TELEPHONE ENCOUNTER
Pt spotted last night and this AM  Pt will go for hcg tomorrow - RK said in 3 days _ I erred - said in 48 hrs   She will call me tomorrow for hcg review

## 2019-03-21 ENCOUNTER — APPOINTMENT (OUTPATIENT)
Dept: LAB | Facility: CLINIC | Age: 44
End: 2019-03-21
Payer: COMMERCIAL

## 2019-03-21 ENCOUNTER — TELEPHONE (OUTPATIENT)
Dept: OBGYN CLINIC | Facility: CLINIC | Age: 44
End: 2019-03-21

## 2019-03-21 DIAGNOSIS — O20.9 BLEEDING IN EARLY PREGNANCY: ICD-10-CM

## 2019-03-21 LAB — B-HCG SERPL-ACNC: 317 MIU/ML

## 2019-03-21 PROCEDURE — 36415 COLL VENOUS BLD VENIPUNCTURE: CPT

## 2019-03-21 PROCEDURE — 84702 CHORIONIC GONADOTROPIN TEST: CPT

## 2019-03-21 NOTE — TELEPHONE ENCOUNTER
Jamie Longoria DO  [facility]             Results within normal limits   Please inform patient

## 2019-03-21 NOTE — TELEPHONE ENCOUNTER
----- Message from Chivo Albert DO sent at 3/21/2019 12:43 PM EDT -----  Have patient repeat quant hcg 1 week    Numbers look great so far

## 2019-03-21 NOTE — TELEPHONE ENCOUNTER
----- Message from Harrison Pearson DO sent at 3/21/2019 12:43 PM EDT -----  Have patient repeat quant hcg 1 week    Numbers look great so far

## 2019-03-21 NOTE — TELEPHONE ENCOUNTER
Pt had spotting last night and this am   No sex last morena  This is the pt on celexa 40 and wellbutrin 300  Her hcg 3/18 was 82  [place] [facility] HCG this am was 317  Per RK, will repeat hcg in 1 week  Pt barely spotting - dark brown - today    HCG slip entered

## 2019-03-21 NOTE — TELEPHONE ENCOUNTER
Patient returned call - she is aware of her results and that she needs to do a repeat HCG in 1 week  Order in pt chart

## 2019-03-22 ENCOUNTER — TELEPHONE (OUTPATIENT)
Dept: OBGYN CLINIC | Facility: CLINIC | Age: 44
End: 2019-03-22

## 2019-03-22 NOTE — TELEPHONE ENCOUNTER
Returned pts' p c - pt is aware of HCG results from yesterday,  317  Pt has light spotting  Is aware to have another HCG drawn thurs  3/28/19  pts' LMP 2/16/19  Advised pt pelvic rest, increase water intake, take PNV's ; to call if blding increases, or pelvic pain  Pt verbalized understanding

## 2019-03-22 NOTE — TELEPHONE ENCOUNTER
Regarding: Non-Urgent Medical Question  Contact: [phone]  ----- Message from [address] [place] [address] 951, Generic sent at 3/22/2019  1:23 PM EDT -----    Im spotting lightly   I know you said I need to wait a week to do another HCG test  Do you think I need to do sooner or is this normal?

## 2019-03-26 ENCOUNTER — TELEPHONE (OUTPATIENT)
Dept: OBGYN CLINIC | Facility: CLINIC | Age: 44
End: 2019-03-26

## 2019-03-26 NOTE — TELEPHONE ENCOUNTER
Regarding: Non-Urgent Medical Question  Contact: [phone]  ----- Message from [address] [place] [address] [address], Generic sent at 3/26/2019  9:31 AM EDT -----    Hi,     I am supposed to go Thursday for a repeat HCG level test  My schedule is crazy with work on Thursday and Friday and was wondering if I could do the test tomorrow (a day early)    Jairo Gann

## 2019-03-28 ENCOUNTER — TELEPHONE (OUTPATIENT)
Dept: OBGYN CLINIC | Facility: CLINIC | Age: 44
End: 2019-03-28

## 2019-03-28 ENCOUNTER — APPOINTMENT (EMERGENCY)
Dept: ULTRASOUND IMAGING | Facility: HOSPITAL | Age: 44
End: 2019-03-28
Payer: COMMERCIAL

## 2019-03-28 ENCOUNTER — HOSPITAL ENCOUNTER (EMERGENCY)
Facility: HOSPITAL | Age: 44
Discharge: HOME | End: 2019-03-28
Attending: EMERGENCY MEDICINE | Admitting: EMERGENCY MEDICINE
Payer: COMMERCIAL

## 2019-03-28 ENCOUNTER — APPOINTMENT (OUTPATIENT)
Dept: LAB | Facility: CLINIC | Age: 44
End: 2019-03-28
Payer: COMMERCIAL

## 2019-03-28 VITALS
RESPIRATION RATE: 18 BRPM | BODY MASS INDEX: 26.56 KG/M2 | HEART RATE: 91 BPM | SYSTOLIC BLOOD PRESSURE: 117 MMHG | WEIGHT: 149.91 LBS | DIASTOLIC BLOOD PRESSURE: 74 MMHG | TEMPERATURE: 97.8 F | OXYGEN SATURATION: 98 % | HEIGHT: 63 IN

## 2019-03-28 DIAGNOSIS — N91.2 AMENORRHEA: ICD-10-CM

## 2019-03-28 DIAGNOSIS — Z34.90 EARLY STAGE OF PREGNANCY: Primary | ICD-10-CM

## 2019-03-28 DIAGNOSIS — O20.0 THREATENED MISCARRIAGE: ICD-10-CM

## 2019-03-28 LAB
ABO GROUP BLD: NORMAL
ANION GAP SERPL CALCULATED.3IONS-SCNC: 7 MMOL/L (ref 4–13)
B-HCG SERPL-ACNC: 6165 MIU/ML
B-HCG SERPL-ACNC: 9430 MIU/ML
BACTERIA UR QL AUTO: ABNORMAL /HPF
BASOPHILS # BLD AUTO: 0.06 THOUSANDS/ΜL (ref 0–0.1)
BASOPHILS NFR BLD AUTO: 1 % (ref 0–1)
BILIRUB UR QL STRIP: NEGATIVE
BLD GP AB SCN SERPL QL: NEGATIVE
BUN SERPL-MCNC: 13 MG/DL (ref 5–25)
CALCIUM SERPL-MCNC: 9.1 MG/DL (ref 8.3–10.1)
CHLORIDE SERPL-SCNC: 104 MMOL/L (ref 100–108)
CLARITY UR: CLEAR
CO2 SERPL-SCNC: 28 MMOL/L (ref 21–32)
COLOR UR: YELLOW
CREAT SERPL-MCNC: 0.68 MG/DL (ref 0.6–1.3)
EOSINOPHIL # BLD AUTO: 0.13 THOUSAND/ΜL (ref 0–0.61)
EOSINOPHIL NFR BLD AUTO: 2 % (ref 0–6)
ERYTHROCYTE [DISTWIDTH] IN BLOOD BY AUTOMATED COUNT: 11.7 % (ref 11.6–15.1)
EXT PREG TEST URINE: POSITIVE
GFR SERPL CREATININE-BSD FRML MDRD: 107 ML/MIN/1.73SQ M
GLUCOSE SERPL-MCNC: 103 MG/DL (ref 65–140)
GLUCOSE UR STRIP-MCNC: NEGATIVE MG/DL
HCT VFR BLD AUTO: 38.9 % (ref 34.8–46.1)
HGB BLD-MCNC: 13.1 G/DL (ref 11.5–15.4)
HGB UR QL STRIP.AUTO: ABNORMAL
IMM GRANULOCYTES # BLD AUTO: 0.03 THOUSAND/UL (ref 0–0.2)
IMM GRANULOCYTES NFR BLD AUTO: 0 % (ref 0–2)
KETONES UR STRIP-MCNC: NEGATIVE MG/DL
LEUKOCYTE ESTERASE UR QL STRIP: NEGATIVE
LYMPHOCYTES # BLD AUTO: 2.06 THOUSANDS/ΜL (ref 0.6–4.47)
LYMPHOCYTES NFR BLD AUTO: 26 % (ref 14–44)
MCH RBC QN AUTO: 30 PG (ref 26.8–34.3)
MCHC RBC AUTO-ENTMCNC: 33.7 G/DL (ref 31.4–37.4)
MCV RBC AUTO: 89 FL (ref 82–98)
MONOCYTES # BLD AUTO: 0.5 THOUSAND/ΜL (ref 0.17–1.22)
MONOCYTES NFR BLD AUTO: 6 % (ref 4–12)
NEUTROPHILS # BLD AUTO: 5.07 THOUSANDS/ΜL (ref 1.85–7.62)
NEUTS SEG NFR BLD AUTO: 65 % (ref 43–75)
NITRITE UR QL STRIP: NEGATIVE
NON-SQ EPI CELLS URNS QL MICRO: ABNORMAL /HPF
NRBC BLD AUTO-RTO: 0 /100 WBCS
PH UR STRIP.AUTO: 6.5 [PH]
PLATELET # BLD AUTO: 249 THOUSANDS/UL (ref 149–390)
PMV BLD AUTO: 9.9 FL (ref 8.9–12.7)
POTASSIUM SERPL-SCNC: 3.5 MMOL/L (ref 3.5–5.3)
PROT UR STRIP-MCNC: NEGATIVE MG/DL
RBC # BLD AUTO: 4.36 MILLION/UL (ref 3.81–5.12)
RBC #/AREA URNS AUTO: ABNORMAL /HPF
RH BLD: POSITIVE
SODIUM SERPL-SCNC: 139 MMOL/L (ref 136–145)
SP GR UR STRIP.AUTO: 1.02 (ref 1–1.03)
SPECIMEN EXPIRATION DATE: NORMAL
UROBILINOGEN UR QL STRIP.AUTO: 0.2 E.U./DL
WBC # BLD AUTO: 7.85 THOUSAND/UL (ref 4.31–10.16)
WBC #/AREA URNS AUTO: ABNORMAL /HPF

## 2019-03-28 PROCEDURE — 99284 EMERGENCY DEPT VISIT MOD MDM: CPT

## 2019-03-28 PROCEDURE — 86900 BLOOD TYPING SEROLOGIC ABO: CPT | Performed by: EMERGENCY MEDICINE

## 2019-03-28 PROCEDURE — 86901 BLOOD TYPING SEROLOGIC RH(D): CPT | Performed by: EMERGENCY MEDICINE

## 2019-03-28 PROCEDURE — 84702 CHORIONIC GONADOTROPIN TEST: CPT

## 2019-03-28 PROCEDURE — 80048 BASIC METABOLIC PNL TOTAL CA: CPT | Performed by: EMERGENCY MEDICINE

## 2019-03-28 PROCEDURE — 84702 CHORIONIC GONADOTROPIN TEST: CPT | Performed by: EMERGENCY MEDICINE

## 2019-03-28 PROCEDURE — 81025 URINE PREGNANCY TEST: CPT | Performed by: EMERGENCY MEDICINE

## 2019-03-28 PROCEDURE — 86850 RBC ANTIBODY SCREEN: CPT | Performed by: EMERGENCY MEDICINE

## 2019-03-28 PROCEDURE — 85025 COMPLETE CBC W/AUTO DIFF WBC: CPT | Performed by: EMERGENCY MEDICINE

## 2019-03-28 PROCEDURE — 76801 OB US < 14 WKS SINGLE FETUS: CPT

## 2019-03-28 PROCEDURE — 81001 URINALYSIS AUTO W/SCOPE: CPT | Performed by: EMERGENCY MEDICINE

## 2019-03-28 PROCEDURE — 36415 COLL VENOUS BLD VENIPUNCTURE: CPT

## 2019-03-28 NOTE — TELEPHONE ENCOUNTER
----- Message from Jamie Longoria DO sent at 3/28/2019  1:24 PM EDT -----  Hcg trend looks excellent, please schedule early ultrasound in office in about one week

## 2019-03-28 NOTE — TELEPHONE ENCOUNTER
Spoke with pt - she is aware of her HCG results  She is scheduled to see Lakshmi Carcamo in [facility] for early u/s

## 2019-03-28 NOTE — TELEPHONE ENCOUNTER
Regarding: Test Results Question  Contact: [phone]  ----- Message from [address] [place] [address] [address], Generic sent at 3/28/2019 12:53 PM EDT -----    I went early this morning for a repeat HCG count  I was wondering if the results were in

## 2019-03-28 NOTE — TELEPHONE ENCOUNTER
Patient returned call - her early u/s is r/s for 04/05 in [facility] with Hector Gusman  If any questions, it could be talked about with IVANA

## 2019-03-29 ENCOUNTER — TELEPHONE (OUTPATIENT)
Dept: OBGYN CLINIC | Facility: CLINIC | Age: 44
End: 2019-03-29

## 2019-03-29 NOTE — TELEPHONE ENCOUNTER
Spoke with pt - after writing the email last night, she was worried - she went to the [facility] ER  She has slight spotting - saw only when she wiped, no blood in toilet  They did a vaginal u/s, thy saw the gestational sac but states it is too early to see anything  They did do another HCG - just from a couple of hours since doing the one in the morning it went from 6000 to over 9000  The ER doctor recommended patient to keep the appointment she has scheduled for Friday and to keep relaxed  Not to lift anything over 25lbs and stay hydrated  Advised pt to follow those directives and if the bleeding becomes heavier or if she starts to have any cramping to call us back  Patient understands

## 2019-03-29 NOTE — TELEPHONE ENCOUNTER
Regarding: Test Results Question  Contact: [phone]  ----- Message from [address] [place] [address] [address], Generic sent at 3/28/2019  5:39 PM EDT -----    I got a call today that my HCG levels were excellent  I was spotting up until a few days ago and then it stopped  But today I started to spot again  Should I be concerned?

## 2019-04-01 ENCOUNTER — TELEPHONE (OUTPATIENT)
Dept: CARDIOLOGY CLINIC | Facility: CLINIC | Age: 44
End: 2019-04-01

## 2019-04-01 ENCOUNTER — TELEPHONE (OUTPATIENT)
Dept: OBGYN CLINIC | Facility: CLINIC | Age: 44
End: 2019-04-01

## 2019-04-01 ENCOUNTER — HOSPITAL ENCOUNTER (EMERGENCY)
Facility: HOSPITAL | Age: 44
Discharge: HOME | End: 2019-04-01
Attending: EMERGENCY MEDICINE
Payer: COMMERCIAL

## 2019-04-01 VITALS
HEART RATE: 85 BPM | TEMPERATURE: 98.2 F | BODY MASS INDEX: 26.63 KG/M2 | DIASTOLIC BLOOD PRESSURE: 58 MMHG | OXYGEN SATURATION: 98 % | SYSTOLIC BLOOD PRESSURE: 115 MMHG | RESPIRATION RATE: 14 BRPM | WEIGHT: 150.35 LBS

## 2019-04-01 DIAGNOSIS — Z34.90 PREGNANT: ICD-10-CM

## 2019-04-01 DIAGNOSIS — M94.0 COSTOCHONDRITIS: ICD-10-CM

## 2019-04-01 DIAGNOSIS — J40 BRONCHITIS: ICD-10-CM

## 2019-04-01 DIAGNOSIS — R07.9 CHEST PAIN: Primary | ICD-10-CM

## 2019-04-01 LAB
ANION GAP SERPL CALCULATED.3IONS-SCNC: 7 MMOL/L (ref 4–13)
ATRIAL RATE: 77 BPM
ATRIAL RATE: 80 BPM
B-HCG SERPL-ACNC: ABNORMAL MIU/ML
BASOPHILS # BLD AUTO: 0.06 THOUSANDS/ΜL (ref 0–0.1)
BASOPHILS NFR BLD AUTO: 1 % (ref 0–1)
BUN SERPL-MCNC: 10 MG/DL (ref 5–25)
CALCIUM SERPL-MCNC: 8.9 MG/DL (ref 8.3–10.1)
CHLORIDE SERPL-SCNC: 104 MMOL/L (ref 100–108)
CO2 SERPL-SCNC: 29 MMOL/L (ref 21–32)
CREAT SERPL-MCNC: 0.61 MG/DL (ref 0.6–1.3)
DEPRECATED D DIMER PPP: 343 NG/ML (FEU)
EOSINOPHIL # BLD AUTO: 0.12 THOUSAND/ΜL (ref 0–0.61)
EOSINOPHIL NFR BLD AUTO: 2 % (ref 0–6)
ERYTHROCYTE [DISTWIDTH] IN BLOOD BY AUTOMATED COUNT: 11.7 % (ref 11.6–15.1)
GFR SERPL CREATININE-BSD FRML MDRD: 111 ML/MIN/1.73SQ M
GLUCOSE SERPL-MCNC: 94 MG/DL (ref 65–140)
HCG SERPL QL: POSITIVE
HCT VFR BLD AUTO: 39.4 % (ref 34.8–46.1)
HGB BLD-MCNC: 13 G/DL (ref 11.5–15.4)
IMM GRANULOCYTES # BLD AUTO: 0.02 THOUSAND/UL (ref 0–0.2)
IMM GRANULOCYTES NFR BLD AUTO: 0 % (ref 0–2)
LYMPHOCYTES # BLD AUTO: 1.84 THOUSANDS/ΜL (ref 0.6–4.47)
LYMPHOCYTES NFR BLD AUTO: 28 % (ref 14–44)
MCH RBC QN AUTO: 29.3 PG (ref 26.8–34.3)
MCHC RBC AUTO-ENTMCNC: 33 G/DL (ref 31.4–37.4)
MCV RBC AUTO: 89 FL (ref 82–98)
MONOCYTES # BLD AUTO: 0.46 THOUSAND/ΜL (ref 0.17–1.22)
MONOCYTES NFR BLD AUTO: 7 % (ref 4–12)
NEUTROPHILS # BLD AUTO: 4.1 THOUSANDS/ΜL (ref 1.85–7.62)
NEUTS SEG NFR BLD AUTO: 62 % (ref 43–75)
NRBC BLD AUTO-RTO: 0 /100 WBCS
P AXIS: 64 DEGREES
P AXIS: 65 DEGREES
PLATELET # BLD AUTO: 236 THOUSANDS/UL (ref 149–390)
PMV BLD AUTO: 9.8 FL (ref 8.9–12.7)
POTASSIUM SERPL-SCNC: 4 MMOL/L (ref 3.5–5.3)
PR INTERVAL: 150 MS
PR INTERVAL: 162 MS
QRS AXIS: 44 DEGREES
QRS AXIS: 50 DEGREES
QRSD INTERVAL: 76 MS
QRSD INTERVAL: 80 MS
QT INTERVAL: 370 MS
QT INTERVAL: 384 MS
QTC INTERVAL: 426 MS
QTC INTERVAL: 434 MS
RBC # BLD AUTO: 4.44 MILLION/UL (ref 3.81–5.12)
SODIUM SERPL-SCNC: 140 MMOL/L (ref 136–145)
T WAVE AXIS: 46 DEGREES
T WAVE AXIS: 48 DEGREES
TROPONIN I SERPL-MCNC: <0.02 NG/ML
TROPONIN I SERPL-MCNC: <0.02 NG/ML
VENTRICULAR RATE: 77 BPM
VENTRICULAR RATE: 80 BPM
WBC # BLD AUTO: 6.6 THOUSAND/UL (ref 4.31–10.16)

## 2019-04-01 PROCEDURE — 99284 EMERGENCY DEPT VISIT MOD MDM: CPT | Performed by: EMERGENCY MEDICINE

## 2019-04-01 PROCEDURE — 80048 BASIC METABOLIC PNL TOTAL CA: CPT | Performed by: EMERGENCY MEDICINE

## 2019-04-01 PROCEDURE — 93005 ELECTROCARDIOGRAM TRACING: CPT

## 2019-04-01 PROCEDURE — 85025 COMPLETE CBC W/AUTO DIFF WBC: CPT | Performed by: EMERGENCY MEDICINE

## 2019-04-01 PROCEDURE — 85379 FIBRIN DEGRADATION QUANT: CPT | Performed by: EMERGENCY MEDICINE

## 2019-04-01 PROCEDURE — 84703 CHORIONIC GONADOTROPIN ASSAY: CPT | Performed by: EMERGENCY MEDICINE

## 2019-04-01 PROCEDURE — 84702 CHORIONIC GONADOTROPIN TEST: CPT | Performed by: EMERGENCY MEDICINE

## 2019-04-01 PROCEDURE — 84484 ASSAY OF TROPONIN QUANT: CPT | Performed by: EMERGENCY MEDICINE

## 2019-04-01 PROCEDURE — 36415 COLL VENOUS BLD VENIPUNCTURE: CPT | Performed by: EMERGENCY MEDICINE

## 2019-04-01 PROCEDURE — 93010 ELECTROCARDIOGRAM REPORT: CPT | Performed by: INTERNAL MEDICINE

## 2019-04-01 PROCEDURE — 99285 EMERGENCY DEPT VISIT HI MDM: CPT

## 2019-04-01 RX ORDER — ALBUTEROL SULFATE 90 UG/1
2 AEROSOL, METERED RESPIRATORY (INHALATION) ONCE
Status: COMPLETED | OUTPATIENT
Start: 2019-04-01 | End: 2019-04-01

## 2019-04-01 RX ADMIN — ALBUTEROL SULFATE 2 PUFF: 90 AEROSOL, METERED RESPIRATORY (INHALATION) at 19:50

## 2019-04-02 ENCOUNTER — TELEPHONE (OUTPATIENT)
Dept: CARDIOLOGY CLINIC | Facility: CLINIC | Age: 44
End: 2019-04-02

## 2019-04-05 ENCOUNTER — OFFICE VISIT (OUTPATIENT)
Dept: OBGYN CLINIC | Facility: MEDICAL CENTER | Age: 44
End: 2019-04-05
Payer: COMMERCIAL

## 2019-04-05 ENCOUNTER — APPOINTMENT (OUTPATIENT)
Dept: LAB | Facility: MEDICAL CENTER | Age: 44
End: 2019-04-05
Payer: COMMERCIAL

## 2019-04-05 ENCOUNTER — TELEPHONE (OUTPATIENT)
Dept: OBGYN CLINIC | Facility: CLINIC | Age: 44
End: 2019-04-05

## 2019-04-05 VITALS — BODY MASS INDEX: 26.93 KG/M2 | WEIGHT: 152 LBS

## 2019-04-05 DIAGNOSIS — O03.9 SPONTANEOUS ABORTION: ICD-10-CM

## 2019-04-05 DIAGNOSIS — O03.9 SPONTANEOUS ABORTION: Primary | ICD-10-CM

## 2019-04-05 LAB — B-HCG SERPL-ACNC: ABNORMAL MIU/ML

## 2019-04-05 PROCEDURE — 36415 COLL VENOUS BLD VENIPUNCTURE: CPT

## 2019-04-05 PROCEDURE — 76817 TRANSVAGINAL US OBSTETRIC: CPT | Performed by: PHYSICIAN ASSISTANT

## 2019-04-05 PROCEDURE — 84702 CHORIONIC GONADOTROPIN TEST: CPT

## 2019-04-07 ENCOUNTER — APPOINTMENT (EMERGENCY)
Dept: ULTRASOUND IMAGING | Facility: HOSPITAL | Age: 44
End: 2019-04-07
Payer: COMMERCIAL

## 2019-04-07 ENCOUNTER — HOSPITAL ENCOUNTER (EMERGENCY)
Facility: HOSPITAL | Age: 44
Discharge: HOME | End: 2019-04-07
Attending: EMERGENCY MEDICINE
Payer: COMMERCIAL

## 2019-04-07 VITALS
BODY MASS INDEX: 25.69 KG/M2 | DIASTOLIC BLOOD PRESSURE: 66 MMHG | RESPIRATION RATE: 18 BRPM | WEIGHT: 145 LBS | HEART RATE: 74 BPM | TEMPERATURE: 98.1 F | HEIGHT: 63 IN | SYSTOLIC BLOOD PRESSURE: 100 MMHG | OXYGEN SATURATION: 99 %

## 2019-04-07 DIAGNOSIS — O20.0 THREATENED MISCARRIAGE: Primary | ICD-10-CM

## 2019-04-07 LAB
B-HCG SERPL-ACNC: ABNORMAL MIU/ML
BACTERIA UR QL AUTO: ABNORMAL /HPF
BILIRUB UR QL STRIP: NEGATIVE
CLARITY UR: CLEAR
COLOR UR: YELLOW
GLUCOSE UR STRIP-MCNC: NEGATIVE MG/DL
HGB UR QL STRIP.AUTO: ABNORMAL
KETONES UR STRIP-MCNC: ABNORMAL MG/DL
LEUKOCYTE ESTERASE UR QL STRIP: NEGATIVE
MUCOUS THREADS UR QL AUTO: ABNORMAL
NITRITE UR QL STRIP: NEGATIVE
NON-SQ EPI CELLS URNS QL MICRO: ABNORMAL /HPF
PH UR STRIP.AUTO: 6 [PH]
PROT UR STRIP-MCNC: NEGATIVE MG/DL
RBC #/AREA URNS AUTO: ABNORMAL /HPF
SP GR UR STRIP.AUTO: >=1.03 (ref 1–1.03)
UROBILINOGEN UR QL STRIP.AUTO: 0.2 E.U./DL
WBC #/AREA URNS AUTO: ABNORMAL /HPF

## 2019-04-07 PROCEDURE — 81001 URINALYSIS AUTO W/SCOPE: CPT | Performed by: PHYSICIAN ASSISTANT

## 2019-04-07 PROCEDURE — 76801 OB US < 14 WKS SINGLE FETUS: CPT

## 2019-04-07 PROCEDURE — 84702 CHORIONIC GONADOTROPIN TEST: CPT | Performed by: PHYSICIAN ASSISTANT

## 2019-04-07 PROCEDURE — 36415 COLL VENOUS BLD VENIPUNCTURE: CPT | Performed by: PHYSICIAN ASSISTANT

## 2019-04-07 PROCEDURE — 99284 EMERGENCY DEPT VISIT MOD MDM: CPT

## 2019-04-07 PROCEDURE — 87086 URINE CULTURE/COLONY COUNT: CPT | Performed by: PHYSICIAN ASSISTANT

## 2019-04-07 PROCEDURE — 99284 EMERGENCY DEPT VISIT MOD MDM: CPT | Performed by: PHYSICIAN ASSISTANT

## 2019-04-08 ENCOUNTER — TELEPHONE (OUTPATIENT)
Dept: OBGYN CLINIC | Facility: CLINIC | Age: 44
End: 2019-04-08

## 2019-04-08 LAB — BACTERIA UR CULT: NORMAL

## 2019-04-09 ENCOUNTER — TELEPHONE (OUTPATIENT)
Dept: OBGYN CLINIC | Facility: CLINIC | Age: 44
End: 2019-04-09

## 2019-04-09 DIAGNOSIS — O02.1 MISSED ABORTION: Primary | ICD-10-CM

## 2019-04-10 ENCOUNTER — TELEPHONE (OUTPATIENT)
Dept: OBGYN CLINIC | Facility: CLINIC | Age: 44
End: 2019-04-10

## 2019-04-10 ENCOUNTER — OFFICE VISIT (OUTPATIENT)
Dept: OBGYN CLINIC | Facility: CLINIC | Age: 44
End: 2019-04-10
Payer: COMMERCIAL

## 2019-04-10 VITALS — BODY MASS INDEX: 27.1 KG/M2 | WEIGHT: 153 LBS | SYSTOLIC BLOOD PRESSURE: 116 MMHG | DIASTOLIC BLOOD PRESSURE: 72 MMHG

## 2019-04-10 DIAGNOSIS — O02.1 MISSED ABORTION: Primary | ICD-10-CM

## 2019-04-10 PROCEDURE — 99212 OFFICE O/P EST SF 10 MIN: CPT | Performed by: OBSTETRICS & GYNECOLOGY

## 2019-04-10 PROCEDURE — 76817 TRANSVAGINAL US OBSTETRIC: CPT | Performed by: OBSTETRICS & GYNECOLOGY

## 2019-04-10 RX ORDER — MISOPROSTOL 200 UG/1
TABLET ORAL
Qty: 4 TABLET | Refills: 1 | Status: SHIPPED | OUTPATIENT
Start: 2019-04-10 | End: 2019-08-06 | Stop reason: ALTCHOICE

## 2019-04-18 ENCOUNTER — TELEPHONE (OUTPATIENT)
Dept: OBGYN CLINIC | Facility: MEDICAL CENTER | Age: 44
End: 2019-04-18

## 2019-04-19 ENCOUNTER — OFFICE VISIT (OUTPATIENT)
Dept: OBGYN CLINIC | Facility: MEDICAL CENTER | Age: 44
End: 2019-04-19
Payer: COMMERCIAL

## 2019-04-19 VITALS — SYSTOLIC BLOOD PRESSURE: 110 MMHG | DIASTOLIC BLOOD PRESSURE: 60 MMHG | BODY MASS INDEX: 26.85 KG/M2 | WEIGHT: 151.6 LBS

## 2019-04-19 DIAGNOSIS — O02.1 MISSED ABORTION: Primary | ICD-10-CM

## 2019-04-19 PROCEDURE — 99214 OFFICE O/P EST MOD 30 MIN: CPT | Performed by: OBSTETRICS & GYNECOLOGY

## 2019-04-19 PROCEDURE — 76817 TRANSVAGINAL US OBSTETRIC: CPT | Performed by: OBSTETRICS & GYNECOLOGY

## 2019-04-22 ENCOUNTER — TELEPHONE (OUTPATIENT)
Dept: OBGYN CLINIC | Facility: CLINIC | Age: 44
End: 2019-04-22

## 2019-04-22 ENCOUNTER — APPOINTMENT (OUTPATIENT)
Dept: LAB | Facility: CLINIC | Age: 44
End: 2019-04-22
Payer: COMMERCIAL

## 2019-04-22 DIAGNOSIS — O03.9 MISCARRIAGE: Primary | ICD-10-CM

## 2019-04-22 DIAGNOSIS — O02.1 MISSED ABORTION: ICD-10-CM

## 2019-04-22 LAB — B-HCG SERPL-ACNC: 363 MIU/ML

## 2019-04-22 PROCEDURE — 36415 COLL VENOUS BLD VENIPUNCTURE: CPT

## 2019-04-22 PROCEDURE — 84702 CHORIONIC GONADOTROPIN TEST: CPT

## 2019-04-29 ENCOUNTER — APPOINTMENT (OUTPATIENT)
Dept: LAB | Facility: CLINIC | Age: 44
End: 2019-04-29
Payer: COMMERCIAL

## 2019-04-29 ENCOUNTER — TELEPHONE (OUTPATIENT)
Dept: OBGYN CLINIC | Facility: CLINIC | Age: 44
End: 2019-04-29

## 2019-04-29 DIAGNOSIS — O03.9 MISCARRIAGE: ICD-10-CM

## 2019-04-29 DIAGNOSIS — O03.9 MISCARRIAGE: Primary | ICD-10-CM

## 2019-04-29 LAB — B-HCG SERPL-ACNC: 178 MIU/ML

## 2019-04-29 PROCEDURE — 36415 COLL VENOUS BLD VENIPUNCTURE: CPT

## 2019-04-29 PROCEDURE — 84702 CHORIONIC GONADOTROPIN TEST: CPT

## 2019-05-08 ENCOUNTER — APPOINTMENT (OUTPATIENT)
Dept: LAB | Facility: CLINIC | Age: 44
End: 2019-05-08
Payer: COMMERCIAL

## 2019-05-08 ENCOUNTER — PATIENT MESSAGE (OUTPATIENT)
Dept: OBGYN CLINIC | Facility: MEDICAL CENTER | Age: 44
End: 2019-05-08

## 2019-05-08 ENCOUNTER — TELEPHONE (OUTPATIENT)
Dept: OBGYN CLINIC | Facility: CLINIC | Age: 44
End: 2019-05-08

## 2019-05-08 DIAGNOSIS — O03.9 MISCARRIAGE: ICD-10-CM

## 2019-05-08 DIAGNOSIS — O03.9 MISCARRIAGE: Primary | ICD-10-CM

## 2019-05-08 LAB — B-HCG SERPL-ACNC: 92 MIU/ML

## 2019-05-08 PROCEDURE — 36415 COLL VENOUS BLD VENIPUNCTURE: CPT

## 2019-05-08 PROCEDURE — 84702 CHORIONIC GONADOTROPIN TEST: CPT

## 2019-05-09 ENCOUNTER — TELEPHONE (OUTPATIENT)
Dept: OBGYN CLINIC | Facility: CLINIC | Age: 44
End: 2019-05-09

## 2019-05-09 DIAGNOSIS — O03.9 MISCARRIAGE: Primary | ICD-10-CM

## 2019-05-14 ENCOUNTER — APPOINTMENT (OUTPATIENT)
Dept: LAB | Facility: CLINIC | Age: 44
End: 2019-05-14
Payer: COMMERCIAL

## 2019-05-14 ENCOUNTER — TELEPHONE (OUTPATIENT)
Dept: OBGYN CLINIC | Facility: CLINIC | Age: 44
End: 2019-05-14

## 2019-05-14 DIAGNOSIS — N91.2 AMENORRHEA: Primary | ICD-10-CM

## 2019-05-14 DIAGNOSIS — O03.9 MISCARRIAGE: ICD-10-CM

## 2019-05-14 LAB — B-HCG SERPL-ACNC: 94 MIU/ML

## 2019-05-14 PROCEDURE — 36415 COLL VENOUS BLD VENIPUNCTURE: CPT

## 2019-05-14 PROCEDURE — 84702 CHORIONIC GONADOTROPIN TEST: CPT

## 2019-05-16 ENCOUNTER — TELEPHONE (OUTPATIENT)
Dept: OBGYN CLINIC | Facility: CLINIC | Age: 44
End: 2019-05-16

## 2019-05-28 ENCOUNTER — APPOINTMENT (OUTPATIENT)
Dept: LAB | Facility: CLINIC | Age: 44
End: 2019-05-28
Payer: COMMERCIAL

## 2019-05-28 ENCOUNTER — TELEPHONE (OUTPATIENT)
Dept: OBGYN CLINIC | Facility: CLINIC | Age: 44
End: 2019-05-28

## 2019-05-28 DIAGNOSIS — O03.9 MISCARRIAGE: Primary | ICD-10-CM

## 2019-05-28 DIAGNOSIS — O03.9 MISCARRIAGE: ICD-10-CM

## 2019-05-28 LAB — B-HCG SERPL-ACNC: 34 MIU/ML

## 2019-05-28 PROCEDURE — 36415 COLL VENOUS BLD VENIPUNCTURE: CPT

## 2019-05-28 PROCEDURE — 84702 CHORIONIC GONADOTROPIN TEST: CPT

## 2019-06-07 ENCOUNTER — TELEPHONE (OUTPATIENT)
Dept: OBGYN CLINIC | Facility: CLINIC | Age: 44
End: 2019-06-07

## 2019-06-11 ENCOUNTER — APPOINTMENT (OUTPATIENT)
Dept: LAB | Facility: CLINIC | Age: 44
End: 2019-06-11
Payer: COMMERCIAL

## 2019-06-11 ENCOUNTER — TELEPHONE (OUTPATIENT)
Dept: OBGYN CLINIC | Facility: CLINIC | Age: 44
End: 2019-06-11

## 2019-06-11 DIAGNOSIS — O03.9 MISCARRIAGE: ICD-10-CM

## 2019-06-11 LAB — B-HCG SERPL-ACNC: <2 MIU/ML

## 2019-06-11 PROCEDURE — 36415 COLL VENOUS BLD VENIPUNCTURE: CPT

## 2019-06-11 PROCEDURE — 84702 CHORIONIC GONADOTROPIN TEST: CPT

## 2019-06-21 ENCOUNTER — TRANSCRIBE ORDERS (OUTPATIENT)
Dept: LAB | Facility: CLINIC | Age: 44
End: 2019-06-21

## 2019-06-21 ENCOUNTER — LAB (OUTPATIENT)
Dept: LAB | Facility: CLINIC | Age: 44
End: 2019-06-21
Payer: COMMERCIAL

## 2019-06-21 DIAGNOSIS — Z00.00 ROUTINE GENERAL MEDICAL EXAMINATION AT A HEALTH CARE FACILITY: ICD-10-CM

## 2019-06-21 DIAGNOSIS — Z00.00 ROUTINE GENERAL MEDICAL EXAMINATION AT A HEALTH CARE FACILITY: Primary | ICD-10-CM

## 2019-06-21 LAB
ALBUMIN SERPL BCP-MCNC: 3.9 G/DL (ref 3.5–5)
ALP SERPL-CCNC: 86 U/L (ref 46–116)
ALT SERPL W P-5'-P-CCNC: 155 U/L (ref 12–78)
ANION GAP SERPL CALCULATED.3IONS-SCNC: 2 MMOL/L (ref 4–13)
AST SERPL W P-5'-P-CCNC: 70 U/L (ref 5–45)
BASOPHILS # BLD AUTO: 0.05 THOUSANDS/ΜL (ref 0–0.1)
BASOPHILS NFR BLD AUTO: 1 % (ref 0–1)
BILIRUB SERPL-MCNC: 0.43 MG/DL (ref 0.2–1)
BUN SERPL-MCNC: 14 MG/DL (ref 5–25)
CALCIUM SERPL-MCNC: 9 MG/DL (ref 8.3–10.1)
CHLORIDE SERPL-SCNC: 106 MMOL/L (ref 100–108)
CHOLEST SERPL-MCNC: 180 MG/DL (ref 50–200)
CO2 SERPL-SCNC: 27 MMOL/L (ref 21–32)
CREAT SERPL-MCNC: 0.68 MG/DL (ref 0.6–1.3)
EOSINOPHIL # BLD AUTO: 0.06 THOUSAND/ΜL (ref 0–0.61)
EOSINOPHIL NFR BLD AUTO: 1 % (ref 0–6)
ERYTHROCYTE [DISTWIDTH] IN BLOOD BY AUTOMATED COUNT: 12.7 % (ref 11.6–15.1)
FERRITIN SERPL-MCNC: 13 NG/ML (ref 8–388)
GFR SERPL CREATININE-BSD FRML MDRD: 107 ML/MIN/1.73SQ M
GLUCOSE P FAST SERPL-MCNC: 90 MG/DL (ref 65–99)
HCT VFR BLD AUTO: 39.6 % (ref 34.8–46.1)
HDLC SERPL-MCNC: 61 MG/DL (ref 40–60)
HGB BLD-MCNC: 12.9 G/DL (ref 11.5–15.4)
IMM GRANULOCYTES # BLD AUTO: 0.02 THOUSAND/UL (ref 0–0.2)
IMM GRANULOCYTES NFR BLD AUTO: 0 % (ref 0–2)
IRON SERPL-MCNC: 134 UG/DL (ref 50–170)
LDLC SERPL CALC-MCNC: 105 MG/DL (ref 0–100)
LYMPHOCYTES # BLD AUTO: 2.96 THOUSANDS/ΜL (ref 0.6–4.47)
LYMPHOCYTES NFR BLD AUTO: 58 % (ref 14–44)
MCH RBC QN AUTO: 29.3 PG (ref 26.8–34.3)
MCHC RBC AUTO-ENTMCNC: 32.6 G/DL (ref 31.4–37.4)
MCV RBC AUTO: 90 FL (ref 82–98)
MONOCYTES # BLD AUTO: 0.41 THOUSAND/ΜL (ref 0.17–1.22)
MONOCYTES NFR BLD AUTO: 8 % (ref 4–12)
NEUTROPHILS # BLD AUTO: 1.62 THOUSANDS/ΜL (ref 1.85–7.62)
NEUTS SEG NFR BLD AUTO: 32 % (ref 43–75)
NONHDLC SERPL-MCNC: 119 MG/DL
NRBC BLD AUTO-RTO: 0 /100 WBCS
PLATELET # BLD AUTO: 212 THOUSANDS/UL (ref 149–390)
PMV BLD AUTO: 10.7 FL (ref 8.9–12.7)
POTASSIUM SERPL-SCNC: 3.8 MMOL/L (ref 3.5–5.3)
PROT SERPL-MCNC: 7.6 G/DL (ref 6.4–8.2)
RBC # BLD AUTO: 4.4 MILLION/UL (ref 3.81–5.12)
SODIUM SERPL-SCNC: 135 MMOL/L (ref 136–145)
TIBC SERPL-MCNC: 417 UG/DL (ref 250–450)
TRIGL SERPL-MCNC: 68 MG/DL
TSH SERPL DL<=0.05 MIU/L-ACNC: 1.31 UIU/ML (ref 0.36–3.74)
WBC # BLD AUTO: 5.12 THOUSAND/UL (ref 4.31–10.16)

## 2019-06-21 PROCEDURE — 80053 COMPREHEN METABOLIC PANEL: CPT

## 2019-06-21 PROCEDURE — 80061 LIPID PANEL: CPT

## 2019-06-21 PROCEDURE — 83550 IRON BINDING TEST: CPT

## 2019-06-21 PROCEDURE — 83540 ASSAY OF IRON: CPT

## 2019-06-21 PROCEDURE — 85025 COMPLETE CBC W/AUTO DIFF WBC: CPT

## 2019-06-21 PROCEDURE — 36415 COLL VENOUS BLD VENIPUNCTURE: CPT

## 2019-06-21 PROCEDURE — 82728 ASSAY OF FERRITIN: CPT

## 2019-06-21 PROCEDURE — 84443 ASSAY THYROID STIM HORMONE: CPT

## 2019-07-01 ENCOUNTER — APPOINTMENT (OUTPATIENT)
Dept: LAB | Facility: CLINIC | Age: 44
End: 2019-07-01
Payer: COMMERCIAL

## 2019-07-01 DIAGNOSIS — O03.9 MISCARRIAGE: Primary | ICD-10-CM

## 2019-07-01 LAB
ALBUMIN SERPL BCP-MCNC: 3.7 G/DL (ref 3.5–5)
ALP SERPL-CCNC: 75 U/L (ref 46–116)
ALT SERPL W P-5'-P-CCNC: 35 U/L (ref 12–78)
AST SERPL W P-5'-P-CCNC: 13 U/L (ref 5–45)
BILIRUB DIRECT SERPL-MCNC: 0.11 MG/DL (ref 0–0.2)
BILIRUB SERPL-MCNC: 0.35 MG/DL (ref 0.2–1)
PROT SERPL-MCNC: 7.3 G/DL (ref 6.4–8.2)

## 2019-07-01 PROCEDURE — 36415 COLL VENOUS BLD VENIPUNCTURE: CPT

## 2019-07-01 PROCEDURE — 80076 HEPATIC FUNCTION PANEL: CPT

## 2019-08-06 ENCOUNTER — ANNUAL EXAM (OUTPATIENT)
Dept: OBGYN CLINIC | Facility: CLINIC | Age: 44
End: 2019-08-06
Payer: COMMERCIAL

## 2019-08-06 VITALS
DIASTOLIC BLOOD PRESSURE: 72 MMHG | HEIGHT: 62 IN | SYSTOLIC BLOOD PRESSURE: 120 MMHG | BODY MASS INDEX: 28.37 KG/M2 | WEIGHT: 154.2 LBS

## 2019-08-06 DIAGNOSIS — Z12.39 SCREENING FOR MALIGNANT NEOPLASM OF BREAST: ICD-10-CM

## 2019-08-06 DIAGNOSIS — Z01.419 ENCOUNTER FOR GYNECOLOGICAL EXAMINATION (GENERAL) (ROUTINE) WITHOUT ABNORMAL FINDINGS: Primary | ICD-10-CM

## 2019-08-06 DIAGNOSIS — Z12.4 CERVICAL CANCER SCREENING: ICD-10-CM

## 2019-08-06 DIAGNOSIS — R92.2 DENSE BREAST: ICD-10-CM

## 2019-08-06 DIAGNOSIS — Z11.51 SCREENING FOR HUMAN PAPILLOMAVIRUS (HPV): ICD-10-CM

## 2019-08-06 PROBLEM — R92.30 DENSE BREAST: Status: ACTIVE | Noted: 2019-08-06

## 2019-08-06 PROCEDURE — 87624 HPV HI-RISK TYP POOLED RSLT: CPT | Performed by: OBSTETRICS & GYNECOLOGY

## 2019-08-06 PROCEDURE — 99396 PREV VISIT EST AGE 40-64: CPT | Performed by: OBSTETRICS & GYNECOLOGY

## 2019-08-06 PROCEDURE — G0145 SCR C/V CYTO,THINLAYER,RESCR: HCPCS | Performed by: OBSTETRICS & GYNECOLOGY

## 2019-08-06 NOTE — PROGRESS NOTES
Assessment/Plan:    No problem-specific Assessment & Plan notes found for this encounter  Diagnoses and all orders for this visit:    Screening for malignant neoplasm of breast  -     Mammo screening bilateral w 3d & cad; Future    Encounter for gynecological examination (general) (routine) without abnormal findings    Cervical cancer screening  -     Liquid-based pap, screening    Screening for human papillomavirus (HPV)  -     Liquid-based pap, screening    Dense breast  -     Mammo screening bilateral w 3d & cad; Future        Annual examination was completed  Mammogram was ordered  Pap with HPV co testing was obtained  Patient was cautioned that menses was present at the time of the Pap and that it may be unsatisfactory  We discussed future pregnancy planning  Patient remains interested  We discussed continuation of prenatal vitamin  I have also discussed placing patient on progesterone and aspirin with 1st positive pregnancy test   We also discussed early HCG values as well as early ultrasound for confirmation  Patient call with any concerns or with positive home pregnancy test otherwise return to the office in 1 year  We did rediscuss the issues of achieving pregnancy advanced maternal age with the associated increased risk of adverse obstetric outcomes  Subjective:      Patient ID: Murphy Alanis is a 37 y o  female  Patient returns for annual gyn visit  Patient is status post early pregnancy loss in this past year and has recovered well  She has had 3 spontaneous regularly time menses since that time  She remains interested in proceeding with attempts at pregnancy  The following portions of the patient's history were reviewed and updated as appropriate:   She  has no past medical history on file    She   Patient Active Problem List    Diagnosis Date Noted    Encounter for gynecological examination (general) (routine) without abnormal findings 08/06/2019    Cervical cancer screening 2019    Screening for human papillomavirus (HPV) 2019    Screening for malignant neoplasm of breast 2019    Dense breast 2019    Missed  04/10/2019    Spontaneous  2019    Pelvic pain 2019    Encounter for screening mammogram for malignant neoplasm of breast 2018    Bilateral breast cysts 2018     She  has a past surgical history that includes  section  Her family history is not on file  She  reports that she has never smoked  She has never used smokeless tobacco  She reports that she drank alcohol  She reports that she does not use drugs  Current Outpatient Medications   Medication Sig Dispense Refill    buPROPion (WELLBUTRIN XL) 300 mg 24 hr tablet       citalopram (CeleXA) 40 mg tablet Take 40 mg by mouth daily  1    Prenatal MV-Min-Fe Fum-FA-DHA (PRENATAL 1 PO) Take by mouth       No current facility-administered medications for this visit  Current Outpatient Medications on File Prior to Visit   Medication Sig    buPROPion (WELLBUTRIN XL) 300 mg 24 hr tablet     citalopram (CeleXA) 40 mg tablet Take 40 mg by mouth daily    Prenatal MV-Min-Fe Fum-FA-DHA (PRENATAL 1 PO) Take by mouth    [DISCONTINUED] misoprostol (CYTOTEC) 200 mcg tablet Place four tablets into vagina on desired day  If no response, repeat in 48 hours  (Patient not taking: Reported on 2019)     No current facility-administered medications on file prior to visit  She has No Known Allergies       Review of Systems   All other systems reviewed and are negative  Objective:      /72 (BP Location: Left arm, Patient Position: Sitting, Cuff Size: Standard)   Ht 5' 2" (1 575 m)   Wt 69 9 kg (154 lb 3 2 oz)   LMP 2019   BMI 28 20 kg/m²          Physical Exam   Constitutional: She is oriented to person, place, and time  She appears well-developed and well-nourished  HENT:   Head: Normocephalic and atraumatic     Nose: Nose normal    Eyes: Pupils are equal, round, and reactive to light  EOM are normal    Neck: Normal range of motion  Neck supple  No thyromegaly present  Pulmonary/Chest: Effort normal  No respiratory distress  No breast tenderness, discharge or bleeding  Breasts no masses, tenderness, skin changes   Abdominal: Soft  She exhibits no distension and no mass  There is no tenderness  Hernia confirmed negative in the right inguinal area and confirmed negative in the left inguinal area  Genitourinary: Vagina normal and uterus normal  No breast tenderness, discharge or bleeding  Pelvic exam was performed with patient supine  No labial fusion  There is no rash, tenderness, lesion or injury on the right labia  There is no rash, tenderness, lesion or injury on the left labia  Cervix exhibits no motion tenderness, no discharge and no friability  Genitourinary Comments: Ext genitalia nl female w/o lesions  Vag healthy without lesions or discharge  Cervix healthy w/o lesions or discharge  uterus nl size, NT, no mass  Adnexa NT, no mass   Musculoskeletal: Normal range of motion  She exhibits no edema  Lymphadenopathy:        Right: No inguinal adenopathy present  Left: No inguinal adenopathy present  Neurological: She is alert and oriented to person, place, and time  She has normal reflexes  Skin: Skin is warm and dry  No rash noted  Psychiatric: She has a normal mood and affect  Her behavior is normal  Thought content normal    Nursing note and vitals reviewed

## 2019-08-07 ENCOUNTER — HOSPITAL ENCOUNTER (EMERGENCY)
Facility: HOSPITAL | Age: 44
Discharge: HOME | End: 2019-08-07
Attending: EMERGENCY MEDICINE | Admitting: EMERGENCY MEDICINE
Payer: COMMERCIAL

## 2019-08-07 VITALS
TEMPERATURE: 98.6 F | RESPIRATION RATE: 17 BRPM | HEART RATE: 74 BPM | WEIGHT: 156.53 LBS | BODY MASS INDEX: 28.63 KG/M2 | SYSTOLIC BLOOD PRESSURE: 110 MMHG | OXYGEN SATURATION: 100 % | DIASTOLIC BLOOD PRESSURE: 70 MMHG
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
## 2019-08-07 DIAGNOSIS — M25.551 RIGHT HIP PAIN: ICD-10-CM

## 2019-08-07 PROCEDURE — 99283 EMERGENCY DEPT VISIT LOW MDM: CPT

## 2019-08-07 PROCEDURE — 99283 EMERGENCY DEPT VISIT LOW MDM: CPT | Performed by: EMERGENCY MEDICINE

## 2019-08-07 RX ORDER — METHOCARBAMOL 500 MG/1
500 TABLET, FILM COATED ORAL 4 TIMES DAILY PRN
Qty: 20 TABLET | Refills: 0 | Status: SHIPPED | OUTPATIENT
Start: 2019-08-07 | End: 2020-09-04

## 2019-08-07 RX ORDER — NAPROXEN 500 MG/1
500 TABLET ORAL 2 TIMES DAILY WITH MEALS
Qty: 20 TABLET | Refills: 0 | Status: SHIPPED | OUTPATIENT
Start: 2019-08-07 | End: 2020-09-04

## 2019-08-07 NOTE — ED PROVIDER NOTES
History  Chief Complaint   Patient presents with    Muscle Pain     Pt states that she fell on Monday and has had increasing pain to her entire R side, along with bruising on her R hip  History provided by:  Patient  Muscle Pain   Location:  Right posterior hip and right trapezius area  Quality:  Aching/sore  Severity:  Mild  Onset quality:  Sudden  Duration:  2 days  Timing:  Constant  Progression:  Worsening  Chronicity:  New  Context:  Pt states that on Monday she tripped and fell over a babygate and landed on her right side  No head injury or LOC  C/o feeling fine afterwards but woke up yesterday and noticed the bruise on right posterior hip and feels sore  Relieved by:  Nothing  Worsened by:  Nothing  Ineffective treatments:  None tried  Associated symptoms: myalgias    Associated symptoms: no abdominal pain, no chest pain, no congestion, no cough, no diarrhea, no fatigue, no fever, no headaches, no loss of consciousness, no shortness of breath, no sore throat and no wheezing        Prior to Admission Medications   Prescriptions Last Dose Informant Patient Reported? Taking? Prenatal MV-Min-Fe Fum-FA-DHA (PRENATAL 1 PO)  Self Yes No   Sig: Take by mouth   buPROPion (WELLBUTRIN XL) 300 mg 24 hr tablet  Self Yes No   citalopram (CeleXA) 40 mg tablet  Self Yes No   Sig: Take 40 mg by mouth daily      Facility-Administered Medications: None       History reviewed  No pertinent past medical history  Past Surgical History:   Procedure Laterality Date    BUNIONECTOMY Right      SECTION         History reviewed  No pertinent family history  I have reviewed and agree with the history as documented  Social History     Tobacco Use    Smoking status: Never Smoker    Smokeless tobacco: Never Used   Substance Use Topics    Alcohol use: Not on file     Comment: social    Drug use: No        Review of Systems   Constitutional: Negative for fatigue and fever     HENT: Negative for congestion and sore throat  Respiratory: Negative for cough, shortness of breath and wheezing  Cardiovascular: Negative for chest pain  Gastrointestinal: Negative for abdominal pain and diarrhea  Musculoskeletal: Positive for myalgias  Neurological: Negative for loss of consciousness and headaches  All other systems reviewed and are negative  Physical Exam  Physical Exam   Constitutional: She is oriented to person, place, and time  She appears well-developed and well-nourished  HENT:   Head: Normocephalic and atraumatic  Eyes: Pupils are equal, round, and reactive to light  EOM are normal    Neck: Neck supple  Cardiovascular: Normal rate and regular rhythm  Pulmonary/Chest: Effort normal and breath sounds normal  No stridor  No respiratory distress  Abdominal: Soft  Bowel sounds are normal  She exhibits no distension  There is no tenderness  Musculoskeletal: She exhibits no edema  Right shoulder: She exhibits normal range of motion, no tenderness and no bony tenderness  Right elbow: She exhibits normal range of motion and no swelling  Right wrist: She exhibits normal range of motion and no tenderness  Right hip: She exhibits normal range of motion (no tendernesss in hip joint and no pain with ROM), normal strength, no tenderness and no bony tenderness  Right knee: She exhibits normal range of motion  No tenderness found  Right ankle: No tenderness  Cervical back: She exhibits tenderness (has some right sided paraspinal and trapezius muscle tenderness, no bony shoulder or cervical tenderness)  She exhibits no bony tenderness, no deformity and no laceration  Back:    Neurological: She is alert and oriented to person, place, and time  No cranial nerve deficit  She exhibits normal muscle tone  Psychiatric: She has a normal mood and affect  Vitals reviewed        Vital Signs  ED Triage Vitals [08/07/19 1716]   Temperature Pulse Respirations Blood Pressure SpO2   98 6 °F (37 °C) 74 17 110/70 100 %      Temp Source Heart Rate Source Patient Position - Orthostatic VS BP Location FiO2 (%)   Oral Monitor Sitting Left arm --      Pain Score       --           Vitals:    08/07/19 1716   BP: 110/70   Pulse: 74   Patient Position - Orthostatic VS: Sitting         Visual Acuity      ED Medications  Medications - No data to display    Diagnostic Studies  Results Reviewed     None                 No orders to display              Procedures  Procedures       ED Course                               MDM    Disposition  Final diagnoses:   Fall, initial encounter   Right hip pain - Posterior     Time reflects when diagnosis was documented in both MDM as applicable and the Disposition within this note     Time User Action Codes Description Comment    8/7/2019  5:46 PM [place] [address], [address] [[id number]  KFOY] Fall, initial encounter     8/7/2019  5:46 PM [place] [address], [place] [address] Right hip pain     8/7/2019  5:47 PM [place], [address] [facility] Right hip pain Posterior      ED Disposition     ED Disposition Condition Date/Time Comment    Discharge Stable Wed Aug 7, 2019  5:46 PM Royer Childers discharge to home/self care              Follow-up Information     Follow up With Specialties Details Why Contact Info Additional [facility] Emergency Department Emergency Medicine Go to  If symptoms worsen [address] [facility] [address] [phone] [place] ED, [address], [place], [address] , DO  Go to  If symptoms worsen 225 [facility]  [address] [facility],[address]  [address] [facility] [address]  [phone]             Discharge Medication List as of 8/7/2019  5:48 PM      START taking these medications    Details   methocarbamol (ROBAXIN) 500 mg tablet Take 1 tablet (500 mg total) by mouth 4 (four) times a day as needed for muscle spasms for up to 10 days, Starting Wed 8/7/2019, Until Sat 8/17/2019, Print      naproxen (NAPROSYN) 500 mg tablet Take 1 tablet (500 mg total) by mouth 2 (two) times a day with meals, Starting Wed 8/7/2019, Until Thu 8/6/2020, Print         CONTINUE these medications which have NOT CHANGED    Details   buPROPion (WELLBUTRIN XL) 300 mg 24 hr tablet Starting Wed 1/16/2019, Historical Med      citalopram (CeleXA) 40 mg tablet Take 40 mg by mouth daily, Starting Sat 6/2/2018, Historical Med      Prenatal MV-Min-Fe Fum-FA-DHA (PRENATAL 1 PO) Take by mouth, Historical Med           No discharge procedures on file      ED Provider  Electronically Signed by           Remigio Camarillo MD  08/07/19 3272

## 2019-08-10 LAB
HPV HR 12 DNA CVX QL NAA+PROBE: NEGATIVE
HPV16 DNA CVX QL NAA+PROBE: NEGATIVE
HPV18 DNA CVX QL NAA+PROBE: NEGATIVE

## 2019-08-12 ENCOUNTER — TELEPHONE (OUTPATIENT)
Dept: OBGYN CLINIC | Facility: CLINIC | Age: 44
End: 2019-08-12

## 2019-08-12 LAB
LAB AP GYN PRIMARY INTERPRETATION: NORMAL
LAB AP LMP: NORMAL
Lab: NORMAL

## 2019-08-12 NOTE — TELEPHONE ENCOUNTER
----- Message from Miles Arroyo DO sent at 8/12/2019  2:23 PM EDT -----  Results within normal limits  Please inform patient

## 2019-09-03 ENCOUNTER — TELEPHONE (OUTPATIENT)
Dept: OBGYN CLINIC | Facility: CLINIC | Age: 44
End: 2019-09-03

## 2019-09-03 DIAGNOSIS — R92.2 DENSE BREAST TISSUE: Primary | ICD-10-CM

## 2019-09-03 NOTE — TELEPHONE ENCOUNTER
----- Message from Raphael Stubbs sent at 9/3/2019  9:06 AM EDT -----  Regarding: Non-Urgent Medical Question  Contact: [phone]  Hi,     I received a script for my routine mammogram  I always get an ultrasound right after and would definitely like to do so this year  Can i get a script for the ultrasound as well? Ty!     [employer]

## 2019-09-12 DIAGNOSIS — R92.2 DENSE BREAST: ICD-10-CM

## 2019-09-12 DIAGNOSIS — Z12.39 SCREENING FOR MALIGNANT NEOPLASM OF BREAST: ICD-10-CM

## 2019-10-15 ENCOUNTER — TELEPHONE (OUTPATIENT)
Dept: OBGYN CLINIC | Facility: CLINIC | Age: 44
End: 2019-10-15

## 2019-10-15 NOTE — TELEPHONE ENCOUNTER
----- Message from Raphael Stubbs sent at 10/15/2019  1:05 PM EDT -----  Regarding: Non-Urgent Medical Question  Contact: [phone]  Hi I had left a message at 8am  I have a question about some cramping Im experiencing      [phone]

## 2019-10-15 NOTE — TELEPHONE ENCOUNTER
No message found, but patient said she is having this lft lower pelvic pain was yesterday and today is concerned because she is trying to get pregnant, her lmp was 9/28, although she has had this pain before she said this has never lasted this long, ?

## 2019-10-15 NOTE — TELEPHONE ENCOUNTER
Pain is likely ovulatory in nature  I would suggest that she utilize 2 Aleve twice daily for the next 48 hours  She may use heating pad on her abdomen as well  Patient to call if she is not seeing continued improvement in her discomfort

## 2019-10-16 ENCOUNTER — HOSPITAL ENCOUNTER (EMERGENCY)
Facility: HOSPITAL | Age: 44
Discharge: HOME | End: 2019-10-16
Attending: EMERGENCY MEDICINE | Admitting: EMERGENCY MEDICINE
Payer: COMMERCIAL

## 2019-10-16 VITALS
OXYGEN SATURATION: 96 % | SYSTOLIC BLOOD PRESSURE: 126 MMHG | TEMPERATURE: 98.8 F | DIASTOLIC BLOOD PRESSURE: 60 MMHG | WEIGHT: 154.98 LBS | RESPIRATION RATE: 18 BRPM | HEART RATE: 82 BPM | BODY MASS INDEX: 28.35 KG/M2

## 2019-10-16 DIAGNOSIS — R10.32 LEFT LOWER QUADRANT ABDOMINAL PAIN: Primary | ICD-10-CM

## 2019-10-16 LAB
ALBUMIN SERPL BCP-MCNC: 4.2 G/DL (ref 3.5–5)
ALP SERPL-CCNC: 71 U/L (ref 46–116)
ALT SERPL W P-5'-P-CCNC: 25 U/L (ref 12–78)
ANION GAP SERPL CALCULATED.3IONS-SCNC: 8 MMOL/L (ref 4–13)
AST SERPL W P-5'-P-CCNC: 14 U/L (ref 5–45)
B-HCG SERPL-ACNC: <2 MIU/ML
BASOPHILS # BLD AUTO: 0.04 THOUSANDS/ΜL (ref 0–0.1)
BASOPHILS NFR BLD AUTO: 1 % (ref 0–1)
BILIRUB SERPL-MCNC: 0.4 MG/DL (ref 0.2–1)
BUN SERPL-MCNC: 13 MG/DL (ref 5–25)
CALCIUM SERPL-MCNC: 8.9 MG/DL (ref 8.3–10.1)
CHLORIDE SERPL-SCNC: 102 MMOL/L (ref 100–108)
CO2 SERPL-SCNC: 26 MMOL/L (ref 21–32)
CREAT SERPL-MCNC: 0.61 MG/DL (ref 0.6–1.3)
EOSINOPHIL # BLD AUTO: 0.05 THOUSAND/ΜL (ref 0–0.61)
EOSINOPHIL NFR BLD AUTO: 1 % (ref 0–6)
ERYTHROCYTE [DISTWIDTH] IN BLOOD BY AUTOMATED COUNT: 12.4 % (ref 11.6–15.1)
GFR SERPL CREATININE-BSD FRML MDRD: 111 ML/MIN/1.73SQ M
GLUCOSE SERPL-MCNC: 98 MG/DL (ref 65–140)
HCT VFR BLD AUTO: 36.8 % (ref 34.8–46.1)
HGB BLD-MCNC: 12.3 G/DL (ref 11.5–15.4)
HOLD SPECIMEN: NORMAL
IMM GRANULOCYTES # BLD AUTO: 0.03 THOUSAND/UL (ref 0–0.2)
IMM GRANULOCYTES NFR BLD AUTO: 1 % (ref 0–2)
LIPASE SERPL-CCNC: 147 U/L (ref 73–393)
LYMPHOCYTES # BLD AUTO: 2.1 THOUSANDS/ΜL (ref 0.6–4.47)
LYMPHOCYTES NFR BLD AUTO: 34 % (ref 14–44)
MCH RBC QN AUTO: 29.9 PG (ref 26.8–34.3)
MCHC RBC AUTO-ENTMCNC: 33.4 G/DL (ref 31.4–37.4)
MCV RBC AUTO: 89 FL (ref 82–98)
MONOCYTES # BLD AUTO: 0.47 THOUSAND/ΜL (ref 0.17–1.22)
MONOCYTES NFR BLD AUTO: 8 % (ref 4–12)
NEUTROPHILS # BLD AUTO: 3.58 THOUSANDS/ΜL (ref 1.85–7.62)
NEUTS SEG NFR BLD AUTO: 55 % (ref 43–75)
NRBC BLD AUTO-RTO: 0 /100 WBCS
PLATELET # BLD AUTO: 233 THOUSANDS/UL (ref 149–390)
PMV BLD AUTO: 10.4 FL (ref 8.9–12.7)
POTASSIUM SERPL-SCNC: 4.2 MMOL/L (ref 3.5–5.3)
PROT SERPL-MCNC: 7.8 G/DL (ref 6.4–8.2)
RBC # BLD AUTO: 4.12 MILLION/UL (ref 3.81–5.12)
SODIUM SERPL-SCNC: 136 MMOL/L (ref 136–145)
WBC # BLD AUTO: 6.27 THOUSAND/UL (ref 4.31–10.16)

## 2019-10-16 PROCEDURE — 80053 COMPREHEN METABOLIC PANEL: CPT | Performed by: EMERGENCY MEDICINE

## 2019-10-16 PROCEDURE — 99282 EMERGENCY DEPT VISIT SF MDM: CPT | Performed by: EMERGENCY MEDICINE

## 2019-10-16 PROCEDURE — 85025 COMPLETE CBC W/AUTO DIFF WBC: CPT | Performed by: EMERGENCY MEDICINE

## 2019-10-16 PROCEDURE — 83690 ASSAY OF LIPASE: CPT | Performed by: EMERGENCY MEDICINE

## 2019-10-16 PROCEDURE — 99283 EMERGENCY DEPT VISIT LOW MDM: CPT

## 2019-10-16 PROCEDURE — 36415 COLL VENOUS BLD VENIPUNCTURE: CPT

## 2019-10-16 PROCEDURE — 84702 CHORIONIC GONADOTROPIN TEST: CPT | Performed by: EMERGENCY MEDICINE

## 2019-10-16 NOTE — ED PROVIDER NOTES
Pt Name: Donita Goodell  MRN: [id number]  [place] 1975  Age/Sex: 40 y o  female  Date of evaluation: 10/16/2019  PCP: Kathy Tao DO    CHIEF COMPLAINT    Chief Complaint   Patient presents with    Abdominal Pain     pt with pains in lower left abd, denies any n/v/d         HPI    40 y o  female presenting with intermittent pain in the left lower abdomen  Patient states has been going on for 3-4 days, has been gradually improving  Pain is mild, cramping, in left lower quadrant, unchanged with movement or position, better with Naprosyn  Patient has been seen by her OBGYN doctor for this, was told that it was likely an ovarian cyst based on her cycle, counseled to take Naprosyn use warm packs  Patient expresses concern that she may be having an ectopic pregnancy  She denies any trauma , nausea, vomiting, diarrhea, changes in urine or bowel movements, or other symptoms  HPI      Past Medical and Surgical History    History reviewed  No pertinent past medical history  Past Surgical History:   Procedure Laterality Date    BUNIONECTOMY Right      SECTION         History reviewed  No pertinent family history  Social History     Tobacco Use    Smoking status: Never Smoker    Smokeless tobacco: Never Used   Substance Use Topics    Alcohol use: Not on file     Comment: social    Drug use: No           Allergies    No Known Allergies    Home Medications    Prior to Admission medications    Medication Sig Start Date End Date Taking?  Authorizing Provider   buPROPion (WELLBUTRIN XL) 300 mg 24 hr tablet  19   Historical Provider, MD   citalopram (CeleXA) 40 mg tablet Take 40 mg by mouth daily 18   Historical Provider, MD   methocarbamol (ROBAXIN) 500 mg tablet Take 1 tablet (500 mg total) by mouth 4 (four) times a day as needed for muscle spasms for up to 10 days 19  Dhara Llanos MD   naproxen (NAPROSYN) 500 mg tablet Take 1 tablet (500 mg total) by mouth 2 (two) times a day with meals 8/7/19 8/6/20  Brianna Page MD   Prenatal MV-Min-Fe Fum-FA-DHA (PRENATAL 1 PO) Take by mouth    Historical Provider, MD           Review of Systems    Review of Systems   Constitutional: Negative for activity change, chills and fever  HENT: Negative for drooling and facial swelling  Eyes: Negative for pain, discharge and visual disturbance  Respiratory: Negative for apnea, cough, chest tightness, shortness of breath and wheezing  Cardiovascular: Negative for chest pain and leg swelling  Gastrointestinal: Positive for abdominal pain  Negative for constipation, diarrhea, nausea and vomiting  Genitourinary: Negative for difficulty urinating, dysuria and urgency  Musculoskeletal: Negative for arthralgias, back pain and gait problem  Skin: Negative for color change and rash  Neurological: Negative for dizziness, speech difficulty, weakness and headaches  Psychiatric/Behavioral: Negative for agitation, behavioral problems and confusion  All other systems reviewed and negative  Physical Exam      ED Triage Vitals [10/16/19 1528]   Temperature Pulse Respirations Blood Pressure SpO2   98 8 °F (37 1 °C) 82 18 126/60 96 %      Temp Source Heart Rate Source Patient Position - Orthostatic VS BP Location FiO2 (%)   Oral Monitor Sitting Left arm --      Pain Score       --               Physical Exam   Constitutional: She is oriented to person, place, and time  She appears well-developed and well-nourished  HENT:   Head: Normocephalic and atraumatic  Nose: Nose normal    Mouth/Throat: Oropharynx is clear and moist    Eyes: Pupils are equal, round, and reactive to light  Conjunctivae and EOM are normal    Neck: Normal range of motion  Neck supple  Cardiovascular: Normal rate, regular rhythm, normal heart sounds and intact distal pulses  Pulmonary/Chest: Effort normal and breath sounds normal  No respiratory distress  She has no wheezes  She has no rales  Abdominal: Soft  She exhibits no distension  There is no tenderness  There is no rebound and no guarding  No tenderness palpation anywhere in the abdomen, including the spot that she indicates that her pain is at  Musculoskeletal: Normal range of motion  She exhibits no edema or deformity  Neurological: She is alert and oriented to person, place, and time  Skin: Skin is warm and dry  No rash noted  No erythema  Psychiatric: She has a normal mood and affect  Her behavior is normal  Judgment and thought content normal    Nursing note and vitals reviewed             Diagnostic Results      Labs:    Results for orders placed or performed during the hospital encounter of 10/16/19   CBC and differential   Result Value Ref Range    WBC 6 27 4 31 - 10 16 Thousand/uL    RBC 4 12 3 81 - 5 12 Million/uL    Hemoglobin 12 3 11 5 - 15 4 g/dL    Hematocrit 36 8 34 8 - 46 1 %    MCV 89 82 - 98 fL    MCH 29 9 26 8 - 34 3 pg    MCHC 33 4 31 4 - 37 4 g/dL    RDW 12 4 11 6 - 15 1 %    MPV 10 4 8 9 - 12 7 fL    Platelets 100 044 - 426 Thousands/uL    nRBC 0 /100 WBCs    Neutrophils Relative 55 43 - 75 %    Immat GRANS % 1 0 - 2 %    Lymphocytes Relative 34 14 - 44 %    Monocytes Relative 8 4 - 12 %    Eosinophils Relative 1 0 - 6 %    Basophils Relative 1 0 - 1 %    Neutrophils Absolute 3 58 1 85 - 7 62 Thousands/µL    Immature Grans Absolute 0 03 0 00 - 0 20 Thousand/uL    Lymphocytes Absolute 2 10 0 60 - 4 47 Thousands/µL    Monocytes Absolute 0 47 0 17 - 1 22 Thousand/µL    Eosinophils Absolute 0 05 0 00 - 0 61 Thousand/µL    Basophils Absolute 0 04 0 00 - 0 10 Thousands/µL   Comprehensive metabolic panel   Result Value Ref Range    Sodium 136 136 - 145 mmol/L    Potassium 4 2 3 5 - 5 3 mmol/L    Chloride 102 100 - 108 mmol/L    CO2 26 21 - 32 mmol/L    ANION GAP 8 4 - 13 mmol/L    BUN 13 5 - 25 mg/dL    Creatinine 0 61 0 60 - 1 30 mg/dL    Glucose 98 65 - 140 mg/dL    Calcium 8 9 8 3 - 10 1 mg/dL    AST 14 5 - 45 U/L ALT 25 12 - 78 U/L    Alkaline Phosphatase 71 46 - 116 U/L    Total Protein 7 8 6 4 - 8 2 g/dL    Albumin 4 2 3 5 - 5 0 g/dL    Total Bilirubin 0 40 0 20 - 1 00 mg/dL    eGFR 111 ml/min/1 73sq m   Lipase   Result Value Ref Range    Lipase 147 73 - 393 u/L   hCG, quantitative, pregnancy   Result Value Ref Range    HCG, Quant <2 <=6 mIU/mL   Green / Black tube on hold   Result Value Ref Range    Extra Tube Hold for add-ons  All labs reviewed and utilized in the medical decision making process    Radiology:    No orders to display       All radiology studies independently viewed by me and interpreted by the radiologist     Procedure    Procedures        ED Course of Care and Re-Assessments      Labs ordered by nurse 1st protocol returned reassuring  Medications - No data to display        FINAL IMPRESSION    Final diagnoses:   Left lower quadrant abdominal pain         DISPOSITION/PLAN    Left lower quadrant abdominal pain as above  Vital signs examination reassuring, lab work likewise reassuring with negative quantitative hCG pregnancy  Do not suspect ectopic pregnancy, PID, TOA, diverticulitis, appendicitis, cholecystitis ovarian torsion, other acute life threat at this time  Discharged strict return precautions, follow up OB  Time reflects when diagnosis was documented in both MDM as applicable and the Disposition within this note     Time User Action Codes Description Comment    10/16/2019  4:46 PM Sid Kirkland Add [R10 32] Left lower quadrant abdominal pain       ED Disposition     ED Disposition Condition Date/Time Comment    Discharge Stable Wed Oct 16, 2019  4:46 PM Bere Mike discharge to home/self care              Follow-up Information     Follow up With Specialties Details Why Contact Info    Renny Hines DO  Call  As needed [address]  [place] [address]  [phone] [facility] [address] and Gynecology, Obstetrics, Gynecology Call in 1 day To discuss your symptoms and further care [address]  [phone]              PATIENT REFERRED TO:    Isac Hennessy, [address] [facility] [address] [place]  300 [facility],6[address] Floor  [address]  [phone]    Call   As needed    Odessa Reyes, [address]  [phone]    Call in 1 day  To discuss your symptoms and further care      DISCHARGE MEDICATIONS:    Patient's Medications   Discharge Prescriptions    No medications on file       No discharge procedures on file           MD Juan Eddy MD  10/16/19 9139

## 2019-10-17 ENCOUNTER — TELEPHONE (OUTPATIENT)
Dept: OBGYN CLINIC | Facility: CLINIC | Age: 44
End: 2019-10-17

## 2019-10-17 ENCOUNTER — HOSPITAL ENCOUNTER (OUTPATIENT)
Dept: ULTRASOUND IMAGING | Facility: HOSPITAL | Age: 44
Discharge: HOME | End: 2019-10-17
Attending: STUDENT IN AN ORGANIZED HEALTH CARE EDUCATION/TRAINING PROGRAM
Payer: COMMERCIAL

## 2019-10-17 DIAGNOSIS — R52 PAIN: ICD-10-CM

## 2019-10-17 DIAGNOSIS — R52 PAIN: Primary | ICD-10-CM

## 2019-10-17 PROCEDURE — 76830 TRANSVAGINAL US NON-OB: CPT

## 2019-10-17 PROCEDURE — 76856 US EXAM PELVIC COMPLETE: CPT

## 2019-10-17 NOTE — TELEPHONE ENCOUNTER
I also told CS, pt had normal pelvic U/S Jan 2019  CS said to order u/s - not urgent and arrange for f/u visit next week  Valeria Andrea, [address] has lots of blocked area on 11/1  Can we use these appts? CS said she has to have ov!!!!!  I told pt she will be called next week  Can check with RK    If not, send back to me and I'll take it to [place] for appt

## 2019-10-17 NOTE — TELEPHONE ENCOUNTER
Pt Chava Earl  [birth year]  She called on the 15 - cramping pain l lo abdomen  LMP 9/28  RK offered Aleve, heating pad, etc  Pt went to ED last night - they just did labs - all normal  HCG neg  Pt wanted an u/s - they refused - said f/u with gyn!!! Has had this cramping pain and its been there 4 days  She wants an u/s  What say you???   Thanks

## 2019-10-19 ENCOUNTER — HOSPITAL ENCOUNTER (EMERGENCY)
Facility: HOSPITAL | Age: 44
Discharge: HOME | End: 2019-10-19
Attending: EMERGENCY MEDICINE
Payer: COMMERCIAL

## 2019-10-19 VITALS
RESPIRATION RATE: 17 BRPM | HEART RATE: 92 BPM | TEMPERATURE: 98.1 F | BODY MASS INDEX: 28.35 KG/M2 | WEIGHT: 155 LBS | SYSTOLIC BLOOD PRESSURE: 135 MMHG | OXYGEN SATURATION: 97 % | DIASTOLIC BLOOD PRESSURE: 69 MMHG

## 2019-10-19 DIAGNOSIS — R10.32 LLQ ABDOMINAL PAIN: Primary | ICD-10-CM

## 2019-10-19 LAB
BILIRUB UR QL STRIP: NEGATIVE
CLARITY UR: CLEAR
COLOR UR: YELLOW
EXT PREG TEST URINE: NORMAL
EXT. CONTROL ED NAV: NORMAL
GLUCOSE UR STRIP-MCNC: NEGATIVE MG/DL
HGB UR QL STRIP.AUTO: NEGATIVE
KETONES UR STRIP-MCNC: NEGATIVE MG/DL
LEUKOCYTE ESTERASE UR QL STRIP: NEGATIVE
NITRITE UR QL STRIP: NEGATIVE
PH UR STRIP.AUTO: 7 [PH]
PROT UR STRIP-MCNC: NEGATIVE MG/DL
SP GR UR STRIP.AUTO: 1.02 (ref 1–1.03)
UROBILINOGEN UR QL STRIP.AUTO: 0.2 E.U./DL

## 2019-10-19 PROCEDURE — 99284 EMERGENCY DEPT VISIT MOD MDM: CPT

## 2019-10-19 PROCEDURE — 99284 EMERGENCY DEPT VISIT MOD MDM: CPT | Performed by: EMERGENCY MEDICINE

## 2019-10-19 PROCEDURE — 81025 URINE PREGNANCY TEST: CPT | Performed by: EMERGENCY MEDICINE

## 2019-10-19 PROCEDURE — 81003 URINALYSIS AUTO W/O SCOPE: CPT | Performed by: EMERGENCY MEDICINE

## 2019-10-19 NOTE — DISCHARGE INSTRUCTIONS
Take ibuprofen (Motrin, Advil) or acetaminophen (Tylenol) as needed and for pain, as per the instructions  Use a heating pad helps  Stretching exercises of the muscles to the area  Follow up with the primary care doctor or OB gyn for further evaluation of your pain

## 2019-10-19 NOTE — ED PROVIDER NOTES
History  Chief Complaint   Patient presents with    Abdominal Pain     Pt reports having lower left quandrant pain that she was seen here for Wednesday, with no improvement  HPI    Prior to Admission Medications   Prescriptions Last Dose Informant Patient Reported? Taking? Prenatal MV-Min-Fe Fum-FA-DHA (PRENATAL 1 PO)  Self Yes No   Sig: Take by mouth   buPROPion (WELLBUTRIN XL) 300 mg 24 hr tablet  Self Yes No   citalopram (CeleXA) 40 mg tablet  Self Yes No   Sig: Take 40 mg by mouth daily   methocarbamol (ROBAXIN) 500 mg tablet   No No   Sig: Take 1 tablet (500 mg total) by mouth 4 (four) times a day as needed for muscle spasms for up to 10 days   naproxen (NAPROSYN) 500 mg tablet   No No   Sig: Take 1 tablet (500 mg total) by mouth 2 (two) times a day with meals      Facility-Administered Medications: None       History reviewed  No pertinent past medical history  Past Surgical History:   Procedure Laterality Date    BUNIONECTOMY Right      SECTION         History reviewed  No pertinent family history  I have reviewed and agree with the history as documented  Social History     Tobacco Use    Smoking status: Never Smoker    Smokeless tobacco: Never Used   Substance Use Topics    Alcohol use: Not on file     Comment: social    Drug use: No        Review of Systems    Physical Exam  Physical Exam   Constitutional: She is oriented to person, place, and time  She appears well-developed and well-nourished  No distress  HENT:   Head: Normocephalic and atraumatic  Mouth/Throat: Oropharynx is clear and moist    Eyes: Pupils are equal, round, and reactive to light  Conjunctivae are normal    Neck: Normal range of motion  No tracheal deviation present  Cardiovascular: Normal rate, regular rhythm, normal heart sounds and intact distal pulses  Pulmonary/Chest: Effort normal and breath sounds normal  No respiratory distress  Abdominal: Soft   Bowel sounds are normal  She exhibits no distension  There is no tenderness  There is no CVA tenderness  Hernia confirmed negative in the right inguinal area and confirmed negative in the left inguinal area  Musculoskeletal:        Left hip: She exhibits normal range of motion (no pain with movement of hip) and no tenderness  Lymphadenopathy: No inguinal adenopathy noted on the left side  Neurological: She is alert and oriented to person, place, and time  She has normal strength  GCS eye subscore is 4  GCS verbal subscore is 5  GCS motor subscore is 6  Skin: Skin is warm and dry  Psychiatric: She has a normal mood and affect  Her behavior is normal    Nursing note and vitals reviewed        Vital Signs  ED Triage Vitals [10/19/19 1748]   Temperature Pulse Respirations Blood Pressure SpO2   98 1 °F (36 7 °C) 92 17 135/69 97 %      Temp Source Heart Rate Source Patient Position - Orthostatic VS BP Location FiO2 (%)   Oral Monitor Sitting Left arm --      Pain Score       6           Vitals:    10/19/19 1748   BP: 135/69   Pulse: 92   Patient Position - Orthostatic VS: Sitting         Visual Acuity      ED Medications  Medications - No data to display    Diagnostic Studies  Results Reviewed     Procedure Component Value Units Date/Time    UA w Reflex to Microscopic w Reflex to Culture [[id number]] Collected:  10/19/19 1841    Lab Status:  Final result Specimen:  Urine, Clean Catch Updated:  10/19/19 1848     Color, UA Yellow     Clarity, UA Clear     Specific Russell, UA 1 020     pH, UA 7 0     Leukocytes, UA Negative     Nitrite, UA Negative     Protein, UA Negative mg/dl      Glucose, UA Negative mg/dl      Ketones, UA Negative mg/dl      Urobilinogen, UA 0 2 E U /dl      Bilirubin, UA Negative     Blood, UA Negative    POCT pregnancy, urine [[id number]]  (Normal) Resulted:  10/19/19 1842    Lab Status:  Final result Updated:  10/19/19 1842     EXT PREG TEST UR (Ref: Negative) negaetive     Control valid                 No orders to display Procedures  Procedures       ED Course                               MDM  Number of Diagnoses or Management Options  LLQ abdominal pain: new and requires workup  Diagnosis management comments: This is a 60-year-old female who presents with left lower quadrant abdominal pain  She began getting pain on 10/14  She states it is cramping, localized to the left lower quadrant and nonradiating  She called her OBGYN on 10/15, and was told it was likely ovulatory given where she was in her cycle, possibly related to a cyst   She was told to take Aleve and use a heating pad which he states has been providing some mild relief  She was seen in the ED on 10/0416 due to persistent pain  She had no tenderness on exam   She had normal lab work, and did not get any imaging done  She called her doctor in 10/17, and was sent for an outpatient pelvic/transvaginal ultrasound  She states she was feeling better today and did not have pain throughout most of the day, however this afternoon she sat up from a reclined position, and developed her current pain, prompting her to come in for evaluation  She denies any vaginal bleeding or discharge  She denies any nausea or vomiting  She denies any diarrhea  She denies any dysuria, hematuria, changes in her urine  She states she has pain during ovulation before, but it has never lasted this long  She says she had not received a report that her ultrasound was read, and was google-ing things that could cause pain in the area that she was having the pain, and becoming progressively more anxious about it, prompting her to come in for re-evaluation  She states she has been trying to get pregnant, so is very concerned that she might be having an ectopic pregnancy and also  She has not yet for missed her menstrual period, and had a negative pregnancy test when she was here three days ago  She says the pain is just below GI her  scar on the left    She did have pain in the area earlier in the year and had a negative ultrasound at that point in time  She denies any other abdominal surgeries  She denies any underlying medical problems  Review of systems:  Otherwise negative unless stated as above    She is well appearing, in no acute distress  She has no abdominal tenderness on exam   She does appear anxious  Exam is otherwise unremarkable  We will recheck patient's pregnancy test today as she is trying to get pregnant  However, it was negative three days ago I have suspicion for ectopic pregnancy contributing to her symptoms  Her ultrasound has not yet been read by the radiologist   She is having no reproducible tenderness, her pain has overall been improving, and she is not having any other associated symptoms to raise suspicion for acute process such as diverticulitis, colitis, intra-abdominal infection  Given this, improving pain, I do not feel there is much benefit to getting a CT scan exposing her to radiation  I discussed this with the patient, and she is in agreement  Her pain is more lateral than would be expected with UTI, however we will check her urine to confirm this  We will repeat a pregnancy test today if she is trying to get pregnant and is very concerned about this  We will try to have the radiologist read the ultrasound, as this will likely help with the patient's anxiety regarding her symptoms  There is potentially a muscular component as it worsens with sitting up, versus pain secondary to her prior  as she states it is near the site of her  scar  Her ultrasound shows no acute abnormalities  Her urine does not show signs of UTI  Her pregnancy test is still negative today  I discussed with the patient findings, continue treatment at home, follow-up, and indications for return, she expresses understanding with this plan         Amount and/or Complexity of Data Reviewed  Clinical lab tests: reviewed and ordered  Decide to obtain previous medical records or to obtain history from someone other than the patient: yes  Review and summarize past medical records: yes        Disposition  Final diagnoses:   None     ED Disposition     None      Follow-up Information    None         Patient's Medications   Discharge Prescriptions    No medications on file     No discharge procedures on file      ED Provider  Electronically Signed by           Kaye Bower MD  10/19/19 1401

## 2019-10-22 ENCOUNTER — TELEPHONE (OUTPATIENT)
Dept: OBGYN CLINIC | Facility: CLINIC | Age: 44
End: 2019-10-22

## 2019-10-22 NOTE — TELEPHONE ENCOUNTER
----- Message from Leida Levy MD sent at 10/22/2019  8:11 AM EDT -----  Regarding: Normal US  Can you please let Ayana Postal know that her US was normal? If she still has pain we can set her up for an appt  Thanks!

## 2019-11-08 ENCOUNTER — TELEPHONE (OUTPATIENT)
Dept: OBGYN CLINIC | Facility: CLINIC | Age: 44
End: 2019-11-08

## 2019-11-08 DIAGNOSIS — Z30.09 FAMILY PLANNING COUNSELING: Primary | ICD-10-CM

## 2019-11-08 NOTE — TELEPHONE ENCOUNTER
----- Message from Raphael Stubbs sent at 11/8/2019 12:17 PM EST -----  Regarding: Non-Urgent Medical Question  Contact: [phone]  Hi I spoke with someone earlier about fertility recommendations  The woman was going to mail me some, but I am off next week and wanted to see if I can get the list of names emailed to me so I can possibly get an appointment  Thank you in advance      Sayra Weaver  [phone]

## 2019-11-08 NOTE — TELEPHONE ENCOUNTER
----- Message from Brook Oliver PA-C sent at 11/8/2019 11:14 AM EST -----  Good morning! I saw that this pt was sched w/ me on Tuesday for family planning - given her history and that she's 40 (advanced maternal age) - we wouldn't do a family planning consult - she needs referral to infertility   i'm happy to order that for her and we can give her info to call and schedule   Thanks!

## 2019-11-11 ENCOUNTER — TELEPHONE (OUTPATIENT)
Dept: OBGYN CLINIC | Facility: CLINIC | Age: 44
End: 2019-11-11

## 2019-11-11 DIAGNOSIS — Z31.69 INFERTILITY COUNSELING: Primary | ICD-10-CM

## 2019-11-11 NOTE — TELEPHONE ENCOUNTER
----- Message from Janine Montano PA-C sent at 11/8/2019  7:49 AM EST -----  Good morning! I saw that this pt was sched w/ me on Tuesday for family planning - given her history and that she's 40 (advanced maternal age) - we wouldn't do a family planning consult - she needs referral to infertility  i'm happy to order that for her and we can give her info to call and schedule  Thanks!

## 2019-11-11 NOTE — TELEPHONE ENCOUNTER
Patient was already called on Friday about her appt for Tuesday  She cancelled it and is calling a infertility clinic for a consult

## 2019-12-04 NOTE — TELEPHONE ENCOUNTER
Spoke w/ pt  Persistent anxieties about breast cysts and disease  Not able to control her thoughts about this  Feels she has some "OCD issues"  Does not feel breast mass, but would like to be certain  Will see in [place] office Wed 9/5 at 1100  no

## 2019-12-20 ENCOUNTER — TELEPHONE (OUTPATIENT)
Dept: PERINATAL CARE | Facility: OTHER | Age: 44
End: 2019-12-20

## 2019-12-20 ENCOUNTER — CONSULT (OUTPATIENT)
Dept: PERINATAL CARE | Facility: OTHER | Age: 44
End: 2019-12-20
Payer: COMMERCIAL

## 2019-12-20 VITALS
DIASTOLIC BLOOD PRESSURE: 79 MMHG | HEART RATE: 77 BPM | WEIGHT: 161.4 LBS | BODY MASS INDEX: 28.6 KG/M2 | SYSTOLIC BLOOD PRESSURE: 115 MMHG | HEIGHT: 63 IN

## 2019-12-20 DIAGNOSIS — Z98.891 PREVIOUS CESAREAN SECTION: ICD-10-CM

## 2019-12-20 DIAGNOSIS — Z31.69 ENCOUNTER FOR PRECONCEPTION CONSULTATION: Primary | ICD-10-CM

## 2019-12-20 DIAGNOSIS — F41.9 ANXIETY: ICD-10-CM

## 2019-12-20 PROCEDURE — 99242 OFF/OP CONSLTJ NEW/EST SF 20: CPT | Performed by: OBSTETRICS & GYNECOLOGY

## 2019-12-20 NOTE — TELEPHONE ENCOUNTER
Pt has a appointment for pre conception appointment 12/20  I  Called patient to let her know her insurance [employer] is coming up in active  I asked her if she had another insurance but per pt only ellen  I spoke with Julio Foster, She checked as well and it is coming up inactive on her end with no secondary insurance  Called pt back and let her know  Per pt is aware and is going to call her insurance and

## 2019-12-20 NOTE — TELEPHONE ENCOUNTER
Pt called back and spoke with insurance  Per pt insurance says it is coming up active on their end  They will look into it    Pt aware as self pay for today's visit and will call her insurance if she gets bill in the mail to back track the date

## 2019-12-20 NOTE — LETTER
2019     [address]  [address] [address]    Patient: Jen Nyhan   YOB: 1975   Date of Visit: 2019       Dear Dr Jean Leak: Thank you for referring Daryle Herb to me for evaluation  Below are my notes for this consultation  If you have questions, please do not hesitate to call me  I look forward to following your patient along with you           Sincerely,         US 1 MO        CC: Elizabeth Stubbs, DO

## 2019-12-27 ENCOUNTER — TELEPHONE (OUTPATIENT)
Dept: OBGYN CLINIC | Facility: CLINIC | Age: 44
End: 2019-12-27

## 2019-12-27 DIAGNOSIS — Z31.41 FERTILITY TESTING: Primary | ICD-10-CM

## 2019-12-27 NOTE — TELEPHONE ENCOUNTER
----- Message from Raphael Stubbs sent at 12/27/2019 11:44 AM EST -----  Regarding: Non-Urgent Medical Question  Contact: [phone]  Hi,    I was a patient of Dr Benitez Riojas  I was pregnant in February but miscarried  I am 40 yrs old and have been trying since to get pregnant but unsuccessful  I was wondering if there was anything I need to do (medications, testing) that I can do at your office before visiting a fertility specialist? Jacob Green called many fertility places and my insurance is not covered anywhere  But I was told I could start the process with my Obgyn first   Timmy Smith can view my chart  Jacob Green been a patient for years  Please call me at   Thank you      Alexi Graves

## 2019-12-27 NOTE — TELEPHONE ENCOUNTER
Pt contacted and advised as directed  Labs ordered pt uses's [facility]'s lab  Pt agreed to plan

## 2019-12-27 NOTE — TELEPHONE ENCOUNTER
We can do an AMH and estradiol level to give her a quick assessment of her fertility  Otherwise her problem is aga and miscarriages  The only thing we can do is follow a new pregnancy much closer and rx progesterone support

## 2019-12-27 NOTE — TELEPHONE ENCOUNTER
Spoke with patient  Asked if I could route this to another provider for recommendations  Does not have any preferences for provider as previous Dr Adeline Londono patient

## 2019-12-30 ENCOUNTER — APPOINTMENT (OUTPATIENT)
Dept: LAB | Facility: CLINIC | Age: 44
End: 2019-12-30
Payer: COMMERCIAL

## 2019-12-30 DIAGNOSIS — Z31.41 FERTILITY TESTING: Primary | ICD-10-CM

## 2019-12-30 LAB — ESTRADIOL SERPL-MCNC: 136 PG/ML

## 2019-12-30 PROCEDURE — 82670 ASSAY OF TOTAL ESTRADIOL: CPT

## 2019-12-30 PROCEDURE — 36415 COLL VENOUS BLD VENIPUNCTURE: CPT

## 2019-12-30 PROCEDURE — 83516 IMMUNOASSAY NONANTIBODY: CPT

## 2020-01-02 ENCOUNTER — TELEPHONE (OUTPATIENT)
Dept: OBGYN CLINIC | Facility: CLINIC | Age: 45
End: 2020-01-02

## 2020-01-02 NOTE — TELEPHONE ENCOUNTER
Pt called office today, left voicemail message stating [facility] wants an explanation of her results "  Pt can be reached @ [phone]

## 2020-01-03 ENCOUNTER — TELEPHONE (OUTPATIENT)
Dept: OBGYN CLINIC | Facility: CLINIC | Age: 45
End: 2020-01-03

## 2020-01-03 LAB — MIS SERPL-MCNC: 3.06 NG/ML

## 2020-01-03 NOTE — TELEPHONE ENCOUNTER
Pt called inquiring about her recent lab results  Results reviewed    Pt is going to be following up with a fertility specialist

## 2020-01-03 NOTE — TELEPHONE ENCOUNTER
----- Message from Raphael Stubbs sent at 1/3/2020  3:26 PM EST -----  Regarding: Non-Urgent Medical Question  Contact: [phone]  I was wondering when my blood results are expected in?

## 2020-01-06 ENCOUNTER — TELEPHONE (OUTPATIENT)
Dept: OBGYN CLINIC | Facility: CLINIC | Age: 45
End: 2020-01-06

## 2020-01-06 NOTE — TELEPHONE ENCOUNTER
----- Message from Raphael Stubbs sent at 1/6/2020  1:03 PM EST -----  Regarding: Non-Urgent Medical Question  Contact: [phone]  Hi,    I got a message from Pavan Donato on behalf of Dr Enid Prater  I had a few questions  I can be reached at   Thanks     Martine Dominguez

## 2020-02-28 ENCOUNTER — APPOINTMENT (OUTPATIENT)
Dept: LAB | Facility: CLINIC | Age: 45
End: 2020-02-28
Payer: COMMERCIAL

## 2020-02-28 ENCOUNTER — TRANSCRIBE ORDERS (OUTPATIENT)
Dept: LAB | Facility: CLINIC | Age: 45
End: 2020-02-28

## 2020-02-28 DIAGNOSIS — Z11.3 SCREENING EXAMINATION FOR VENEREAL DISEASE: ICD-10-CM

## 2020-02-28 DIAGNOSIS — Z31.49 OTHER INVESTIGATION AND TESTING FOR PROCREATIVE MANAGEMENT: Primary | ICD-10-CM

## 2020-02-28 DIAGNOSIS — Z13.1 SCREENING FOR DIABETES MELLITUS: ICD-10-CM

## 2020-02-28 DIAGNOSIS — Z11.59 SCREENING EXAMINATION FOR POLIOMYELITIS: ICD-10-CM

## 2020-02-28 DIAGNOSIS — Z13.0 SCREENING FOR IRON DEFICIENCY ANEMIA: ICD-10-CM

## 2020-02-28 DIAGNOSIS — Z11.4 SCREENING FOR HUMAN IMMUNODEFICIENCY VIRUS: ICD-10-CM

## 2020-02-28 DIAGNOSIS — Z11.3 SCREENING EXAMINATION FOR VENEREAL DISEASE: Primary | ICD-10-CM

## 2020-02-28 DIAGNOSIS — Z13.21 SCREENING FOR MALNUTRITION: ICD-10-CM

## 2020-02-28 DIAGNOSIS — Z01.83 ENCOUNTER FOR BLOOD TYPING: ICD-10-CM

## 2020-02-28 DIAGNOSIS — Z31.49 OTHER INVESTIGATION AND TESTING FOR PROCREATIVE MANAGEMENT: ICD-10-CM

## 2020-02-28 LAB
25(OH)D3 SERPL-MCNC: 28.9 NG/ML (ref 30–100)
ABO GROUP BLD: NORMAL
ALBUMIN SERPL BCP-MCNC: 3.8 G/DL (ref 3.5–5)
ALP SERPL-CCNC: 68 U/L (ref 46–116)
ALT SERPL W P-5'-P-CCNC: 20 U/L (ref 12–78)
ANION GAP SERPL CALCULATED.3IONS-SCNC: 5 MMOL/L (ref 4–13)
AST SERPL W P-5'-P-CCNC: 12 U/L (ref 5–45)
BASOPHILS # BLD AUTO: 0.04 THOUSANDS/ΜL (ref 0–0.1)
BASOPHILS NFR BLD AUTO: 1 % (ref 0–1)
BILIRUB SERPL-MCNC: 0.35 MG/DL (ref 0.2–1)
BUN SERPL-MCNC: 14 MG/DL (ref 5–25)
CALCIUM SERPL-MCNC: 8.6 MG/DL (ref 8.3–10.1)
CHLORIDE SERPL-SCNC: 106 MMOL/L (ref 100–108)
CO2 SERPL-SCNC: 27 MMOL/L (ref 21–32)
CREAT SERPL-MCNC: 0.63 MG/DL (ref 0.6–1.3)
EOSINOPHIL # BLD AUTO: 0.08 THOUSAND/ΜL (ref 0–0.61)
EOSINOPHIL NFR BLD AUTO: 2 % (ref 0–6)
ERYTHROCYTE [DISTWIDTH] IN BLOOD BY AUTOMATED COUNT: 13.7 % (ref 11.6–15.1)
EST. AVERAGE GLUCOSE BLD GHB EST-MCNC: 97 MG/DL
GFR SERPL CREATININE-BSD FRML MDRD: 109 ML/MIN/1.73SQ M
GLUCOSE P FAST SERPL-MCNC: 102 MG/DL (ref 65–99)
HBA1C MFR BLD: 5 %
HCT VFR BLD AUTO: 35.3 % (ref 34.8–46.1)
HCV AB SER QL: NORMAL
HGB BLD-MCNC: 11.4 G/DL (ref 11.5–15.4)
IMM GRANULOCYTES # BLD AUTO: 0.02 THOUSAND/UL (ref 0–0.2)
IMM GRANULOCYTES NFR BLD AUTO: 1 % (ref 0–2)
LYMPHOCYTES # BLD AUTO: 1.53 THOUSANDS/ΜL (ref 0.6–4.47)
LYMPHOCYTES NFR BLD AUTO: 35 % (ref 14–44)
MCH RBC QN AUTO: 28.9 PG (ref 26.8–34.3)
MCHC RBC AUTO-ENTMCNC: 32.3 G/DL (ref 31.4–37.4)
MCV RBC AUTO: 90 FL (ref 82–98)
MONOCYTES # BLD AUTO: 0.28 THOUSAND/ΜL (ref 0.17–1.22)
MONOCYTES NFR BLD AUTO: 6 % (ref 4–12)
NEUTROPHILS # BLD AUTO: 2.41 THOUSANDS/ΜL (ref 1.85–7.62)
NEUTS SEG NFR BLD AUTO: 55 % (ref 43–75)
NRBC BLD AUTO-RTO: 0 /100 WBCS
PLATELET # BLD AUTO: 245 THOUSANDS/UL (ref 149–390)
PMV BLD AUTO: 11.1 FL (ref 8.9–12.7)
POTASSIUM SERPL-SCNC: 3.6 MMOL/L (ref 3.5–5.3)
PROT SERPL-MCNC: 7.2 G/DL (ref 6.4–8.2)
RBC # BLD AUTO: 3.94 MILLION/UL (ref 3.81–5.12)
RH BLD: POSITIVE
RPR SER QL: NORMAL
SODIUM SERPL-SCNC: 138 MMOL/L (ref 136–145)
WBC # BLD AUTO: 4.36 THOUSAND/UL (ref 4.31–10.16)

## 2020-02-28 PROCEDURE — 87491 CHLMYD TRACH DNA AMP PROBE: CPT

## 2020-02-28 PROCEDURE — 83036 HEMOGLOBIN GLYCOSYLATED A1C: CPT

## 2020-02-28 PROCEDURE — 86803 HEPATITIS C AB TEST: CPT

## 2020-02-28 PROCEDURE — 87591 N.GONORRHOEAE DNA AMP PROB: CPT

## 2020-02-28 PROCEDURE — 87389 HIV-1 AG W/HIV-1&-2 AB AG IA: CPT

## 2020-02-28 PROCEDURE — 85025 COMPLETE CBC W/AUTO DIFF WBC: CPT

## 2020-02-28 PROCEDURE — 86592 SYPHILIS TEST NON-TREP QUAL: CPT

## 2020-02-28 PROCEDURE — 36415 COLL VENOUS BLD VENIPUNCTURE: CPT

## 2020-02-28 PROCEDURE — 80053 COMPREHEN METABOLIC PANEL: CPT

## 2020-02-28 PROCEDURE — 87350 HEPATITIS BE AG IA: CPT

## 2020-02-28 PROCEDURE — 86901 BLOOD TYPING SEROLOGIC RH(D): CPT

## 2020-02-28 PROCEDURE — 82306 VITAMIN D 25 HYDROXY: CPT

## 2020-02-28 PROCEDURE — 86900 BLOOD TYPING SEROLOGIC ABO: CPT

## 2020-02-29 LAB — HBV E AG SERPL QL IA: NEGATIVE

## 2020-03-01 LAB — HIV 1+2 AB+HIV1 P24 AG SERPL QL IA: NORMAL

## 2020-03-02 LAB
C TRACH DNA SPEC QL NAA+PROBE: NEGATIVE
N GONORRHOEA DNA SPEC QL NAA+PROBE: NEGATIVE

## 2020-09-04 ENCOUNTER — APPOINTMENT (EMERGENCY)
Dept: ULTRASOUND IMAGING | Facility: HOSPITAL | Age: 45
End: 2020-09-04
Payer: COMMERCIAL

## 2020-09-04 ENCOUNTER — HOSPITAL ENCOUNTER (EMERGENCY)
Facility: HOSPITAL | Age: 45
Discharge: HOME | End: 2020-09-04
Attending: EMERGENCY MEDICINE | Admitting: EMERGENCY MEDICINE
Payer: COMMERCIAL

## 2020-09-04 VITALS
HEART RATE: 82 BPM | RESPIRATION RATE: 20 BRPM | TEMPERATURE: 98.4 F | OXYGEN SATURATION: 97 % | SYSTOLIC BLOOD PRESSURE: 117 MMHG | DIASTOLIC BLOOD PRESSURE: 75 MMHG

## 2020-09-04 DIAGNOSIS — O03.4 INEVITABLE ABORTION: Primary | ICD-10-CM

## 2020-09-04 LAB
B-HCG SERPL-ACNC: ABNORMAL MIU/ML
EXT PREG TEST URINE: POSITIVE
EXT. CONTROL ED NAV: ABNORMAL

## 2020-09-04 PROCEDURE — 99284 EMERGENCY DEPT VISIT MOD MDM: CPT

## 2020-09-04 PROCEDURE — 84702 CHORIONIC GONADOTROPIN TEST: CPT | Performed by: EMERGENCY MEDICINE

## 2020-09-04 PROCEDURE — 76815 OB US LIMITED FETUS(S): CPT

## 2020-09-04 PROCEDURE — 36415 COLL VENOUS BLD VENIPUNCTURE: CPT | Performed by: EMERGENCY MEDICINE

## 2020-09-04 PROCEDURE — 99284 EMERGENCY DEPT VISIT MOD MDM: CPT | Performed by: EMERGENCY MEDICINE

## 2020-09-04 PROCEDURE — 81025 URINE PREGNANCY TEST: CPT | Performed by: EMERGENCY MEDICINE

## 2020-09-04 NOTE — ED PROVIDER NOTES
History  Chief Complaint   Patient presents with    Vaginal Bleeding - Pregnant     spotting cramping 5 1/2 weeks pregnant, called OB, no answer     Patient is a 51-year-old female  032 at approximately 5 and half weeks gestation presenting for abdominal cramping and vaginal spotting  Patient states that she has had spotting with wiping today, no blood in her underwear, did not require tampon or pad as well as left lower quadrant nonradiating intermittent abdominal cramping beginning last night  She states that she has had miscarriages before and was concerned  Her last menstrual period was  and her beta quantitative hCG 3 days ago was 7000  She has not had ultrasound yet  She did undergo in vitro fertilization  Has not taken any medication for the pain  Denies any chest pain, shortness breath, dizziness, upper respiratory symptoms, dysuria, vaginal discharge, nausea/vomiting/diarrhea/constipation, fevers  Prior to Admission Medications   Prescriptions Last Dose Informant Patient Reported? Taking? Prenatal MV-Min-Fe Fum-FA-DHA (PRENATAL 1 PO)  Self Yes Yes   Sig: Take by mouth   buPROPion (WELLBUTRIN XL) 300 mg 24 hr tablet  Self Yes Yes   citalopram (CeleXA) 40 mg tablet  Self Yes Yes   Sig: Take 40 mg by mouth daily      Facility-Administered Medications: None       History reviewed  No pertinent past medical history  Past Surgical History:   Procedure Laterality Date    BUNIONECTOMY Right      SECTION         History reviewed  No pertinent family history  I have reviewed and agree with the history as documented  E-Cigarette/Vaping     E-Cigarette/Vaping Substances     Social History     Tobacco Use    Smoking status: Never Smoker    Smokeless tobacco: Never Used   Substance Use Topics    Alcohol Use     Frequency: Never     Comment: social    Drug use: No       Review of Systems   All other systems reviewed and are negative        Physical Exam  Physical Exam  Vitals signs reviewed  Constitutional:       General: She is not in acute distress  Appearance: Normal appearance  She is not ill-appearing  HENT:      Mouth/Throat:      Mouth: Mucous membranes are moist    Eyes:      Conjunctiva/sclera: Conjunctivae normal    Neck:      Musculoskeletal: Neck supple  Cardiovascular:      Rate and Rhythm: Normal rate and regular rhythm  Heart sounds: Normal heart sounds  Pulmonary:      Effort: Pulmonary effort is normal       Breath sounds: Normal breath sounds  Abdominal:      General: Abdomen is flat  Palpations: Abdomen is soft  Tenderness: There is no abdominal tenderness  Musculoskeletal: Normal range of motion  General: No swelling  Skin:     General: Skin is warm and dry  Neurological:      General: No focal deficit present  Mental Status: She is alert     Psychiatric:         Mood and Affect: Mood normal          Vital Signs  ED Triage Vitals [09/04/20 1839]   Temperature Pulse Respirations Blood Pressure SpO2   98 4 °F (36 9 °C) 82 20 117/75 97 %      Temp Source Heart Rate Source Patient Position - Orthostatic VS BP Location FiO2 (%)   Oral Monitor Sitting Left arm --      Pain Score       5           Vitals:    09/04/20 1839   BP: 117/75   Pulse: 82   Patient Position - Orthostatic VS: Sitting         Visual Acuity      ED Medications  Medications - No data to display    Diagnostic Studies  Results Reviewed     Procedure Component Value Units Date/Time    hCG, quantitative [[id number]]  (Abnormal) Collected:  09/04/20 1951    Lab Status:  Final result Specimen:  Blood from Arm, Right Updated:  09/04/20 2041     HCG, Quant 13,389 mIU/mL     Narrative:        Expected Ranges:     Approximate               Approximate HCG  Gestation age          Concentration ( mIU/mL)  _____________          ______________________   [facility]                      HCG values  0 2-1                       5-50  1-2                           2-3 100-5000  3-4                         500-83610  4-5                         1000-81600  5-6                         [id number]  6-8                         14358-556996  8-12                        23657-142633      POCT pregnancy, urine [[id number]]  (Abnormal) Resulted:  20    Lab Status:  Final result Updated:  20     EXT PREG TEST UR (Ref: Negative) positive     Control valid                 US OB pregnancy limited with transvaginal   Final Result by Belén Sousa MD ([id number])      Single intrauterine gestational sac with yolk sac and abnormal appearing fetal pole  No cardiac activity  Minimal cervical slitlike opening is noted  Favor  in progress  Recommend serial serum beta hCG and if necessary follow-up ultrasound                I personally discussed this study with Helio Pastranas on 2020 at 9:47 PM                Workstation performed: [id number]                    Procedures  Procedures         ED Course  ED Course as of Sep 04 2227   Fri Sep 04, 2020   2200 Cervical os open, transvaginal ultrasound shows  in progress  Have discussed this with the patient and need for OBGYN follow-up and she states she understands  I have discussed return precautions such as purulent vaginal discharge, fevers, change in abdominal pain, vaginal bleeding soaking greater than 1 pad an hour she states she understands  US AUDIT      Most Recent Value   Initial Alcohol Screen: US AUDIT-C    1  How often do you have a drink containing alcohol?  0 Filed at: 2020   2  How many drinks containing alcohol do you have on a typical day you are drinking? 0 Filed at: 2020   3b  FEMALE Any Age, or MALE 65+: How often do you have 4 or more drinks on one occassion? 0 Filed at: 2020   Audit-C Score  0 Filed at: 2020                  LUISA/DAST-10      Most Recent Value   How many times in the past year have you       Used an illegal drug or used a prescription medication for non-medical reasons? Never Filed at: 2020 6235                                [facility]  Number of Diagnoses or Management Options  Inevitable :   Diagnosis management comments: Patient is a 80-year-old female no past medical history  032 at approximately 5 and half weeks gestation presenting for abdominal cramping and vaginal bleeding concerning for ectopic versus heterotopic pregnancy versus subchorionic hemorrhage versus vaginal bleeding in 1st trimester versus miscarriage  Patient is well-appearing bedside stable vitals and in no acute distress  Abdomen is nontender, cervical os closed on bimanual exam with no CMT or adnexal tenderness  Will obtain transvaginal ultrasound due to history of in vitro fertilization, beta quantitative hCG  Former blood testing shows the patient is B positive and therefore will not require RhoGAM         Disposition  Final diagnoses:   Inevitable      Time reflects when diagnosis was documented in both MDM as applicable and the Disposition within this note     Time User Action Codes Description Comment    2020 10:01 PM Clifm Rear Add [O03 9] Inevitable        ED Disposition     ED Disposition Condition Date/Time Comment    Discharge Stable Fri Sep 4, 2020 10:01 PM Alondra Martínez discharge to home/self care              Follow-up Information     Follow up With Specialties Details Why Contact Info Additional [address]  Obstetrics and Gynecology In 2 days  [phone] [address] [place] [address] [phone] [facility], C/Pete Hardin [facility], [address] [place] [address]   [phone]          Discharge Medication List as of 2020 10:01 PM      CONTINUE these medications which have NOT CHANGED    Details   buPROPion (WELLBUTRIN XL) 300 mg 24 hr tablet Starting 2019, Historical Med      citalopram (CeleXA) 40 mg tablet Take 40 mg by mouth daily, Starting Sat 6/2/2018, Historical Med      Prenatal MV-Min-Fe Fum-FA-DHA (PRENATAL 1 PO) Take by mouth, Historical Med           No discharge procedures on file      PDMP Review     None          ED Provider  Electronically Signed by           Katina Bronson DO  09/04/20 0957

## 2020-09-21 ENCOUNTER — ULTRASOUND (OUTPATIENT)
Dept: OBGYN CLINIC | Facility: CLINIC | Age: 45
End: 2020-09-21
Payer: COMMERCIAL

## 2020-09-21 VITALS
BODY MASS INDEX: 29.23 KG/M2 | WEIGHT: 165 LBS | DIASTOLIC BLOOD PRESSURE: 108 MMHG | TEMPERATURE: 98 F | SYSTOLIC BLOOD PRESSURE: 134 MMHG

## 2020-09-21 DIAGNOSIS — O09.529 ADVANCED MATERNAL AGE IN MULTIGRAVIDA, UNSPECIFIED TRIMESTER: ICD-10-CM

## 2020-09-21 DIAGNOSIS — O09.819 PREGNANCY RESULTING FROM IN VITRO FERTILIZATION, ANTEPARTUM: ICD-10-CM

## 2020-09-21 DIAGNOSIS — O36.80X1 ENCOUNTER TO DETERMINE FETAL VIABILITY OF PREGNANCY, FETUS 1: Primary | ICD-10-CM

## 2020-09-21 DIAGNOSIS — O20.9 VAGINAL BLEEDING IN PREGNANCY, FIRST TRIMESTER: ICD-10-CM

## 2020-09-21 DIAGNOSIS — O34.219 HISTORY OF CESAREAN DELIVERY AFFECTING PREGNANCY: ICD-10-CM

## 2020-09-21 PROCEDURE — 99213 OFFICE O/P EST LOW 20 MIN: CPT | Performed by: OBSTETRICS & GYNECOLOGY

## 2020-09-21 RX ORDER — SYRINGE WITH NEEDLE, 1 ML 25GX5/8"
SYRINGE, EMPTY DISPOSABLE MISCELLANEOUS
COMMUNITY
Start: 2020-08-26 | End: 2020-12-30

## 2020-09-21 RX ORDER — ESTRADIOL 2 MG/1
2 TABLET ORAL 2 TIMES DAILY
COMMUNITY
Start: 2020-09-10 | End: 2020-12-30

## 2020-09-21 RX ORDER — PROGESTERONE 50 MG/ML
INJECTION, SOLUTION INTRAMUSCULAR
COMMUNITY
Start: 2020-08-17 | End: 2020-12-30

## 2020-09-21 RX ORDER — IBUPROFEN 800 MG/1
800 TABLET ORAL EVERY 6 HOURS
COMMUNITY
Start: 2020-08-03 | End: 2020-10-06

## 2020-09-21 RX ORDER — BUPROPION HYDROCHLORIDE 450 MG/1
450 TABLET, FILM COATED, EXTENDED RELEASE ORAL DAILY
COMMUNITY
Start: 2020-08-03 | End: 2020-12-30

## 2020-09-21 NOTE — PROGRESS NOTES
Assessment/Plan:         Diagnoses and all orders for this visit:    Encounter to determine fetal viability of pregnancy, fetus 1  Comments:  IVF pregnancy with preimplantation genetics completed  Had first trimester spotting which has resolved  RUDDY confirmed  Vaginal bleeding in pregnancy, first trimester    Pregnancy resulting from in vitro fertilization, antepartum    Advanced maternal age in multigravida, unspecified trimester    History of  delivery affecting pregnancy    Other orders  -     buPROPion (FORFIVO XL) 450 MG 24 hr tablet; Take 450 mg by mouth daily  -     estradiol (ESTRACE) 2 MG tablet; Take 2 mg by mouth 2 (two) times a day  -     ibuprofen (MOTRIN) 800 mg tablet; Take 800 mg by mouth every 6 (six) hours  -     progesterone 50 mg/mL injection  -     B-D 3CC LUER-ETIENNE SYR 14BO4-9/2 22G X 1-1/2" 3 ML MISC; USE TO INJECT PROGESTERONE          Subjective:      Patient ID: Saad Richardson is a 39 y o  female  Patient presents today for early pregnancy ultrasound    LMP - 20  Patient reported having regular cycles; planned pregnancy; no bleeding/cramping  Did have spotting at 5 weeks, presented to ER  This has resolved  csection x 2, plan for repeat  Advanced maternal age  IVF implantation genetics completed (Dr Clarke Ortiz)  BP elevated today, usually normal as per patient, will continue to monitor  Medications reviewed -- Buspar and Celexa - at this time plan to continue medications, discussed use of "lowest effective dose" and consideration of adidng counseling if needed  The following portions of the patient's history were reviewed and updated as appropriate:   She  has no past medical history on file    She   Patient Active Problem List    Diagnosis Date Noted    History of  delivery affecting pregnancy 2020    Advanced maternal age in multigravida, unspecified trimester 2020    Pregnancy resulting from in vitro fertilization, antepartum 2020    Anxiety associated with depression 2020     She  has a past surgical history that includes  section and Bunionectomy (Right)  Her family history is not on file  She  reports that she has never smoked  She has never used smokeless tobacco  She reports that she does not use drugs  No history on file for alcohol  Current Outpatient Medications   Medication Sig Dispense Refill    B-D 3CC LUER-ETIENNE SYR 57RQ8-0/2 22G X 1-1/2" 3 ML MISC USE TO INJECT PROGESTERONE      buPROPion (FORFIVO XL) 450 MG 24 hr tablet Take 450 mg by mouth daily      citalopram (CeleXA) 40 mg tablet Take 40 mg by mouth daily  1    estradiol (ESTRACE) 2 MG tablet Take 2 mg by mouth 2 (two) times a day      Prenatal MV-Min-Fe Fum-FA-DHA (PRENATAL 1 PO) Take by mouth      progesterone 50 mg/mL injection       ibuprofen (MOTRIN) 800 mg tablet Take 800 mg by mouth every 6 (six) hours       No current facility-administered medications for this visit  Current Outpatient Medications on File Prior to Visit   Medication Sig    B-D 3CC LUER-ETIENNE SYR 74IR7-1/2 22G X 1-2" 3 ML MISC USE TO INJECT PROGESTERONE    buPROPion (FORFIVO XL) 450 MG 24 hr tablet Take 450 mg by mouth daily    citalopram (CeleXA) 40 mg tablet Take 40 mg by mouth daily    estradiol (ESTRACE) 2 MG tablet Take 2 mg by mouth 2 (two) times a day    Prenatal MV-Min-Fe Fum-FA-DHA (PRENATAL 1 PO) Take by mouth    progesterone 50 mg/mL injection     ibuprofen (MOTRIN) 800 mg tablet Take 800 mg by mouth every 6 (six) hours     No current facility-administered medications on file prior to visit  She has No Known Allergies       Review of Systems   Constitutional: Negative for activity change, appetite change, chills, fatigue and fever  HENT: Negative for rhinorrhea, sneezing and sore throat  Eyes: Negative for visual disturbance  Respiratory: Negative for cough, shortness of breath and wheezing      Cardiovascular: Negative for chest pain, palpitations and leg swelling  Gastrointestinal: Negative for abdominal distention, constipation, diarrhea, nausea and vomiting  Genitourinary: Negative for difficulty urinating  Neurological: Negative for syncope and light-headedness  Objective:      BP (!) 134/108 (BP Location: Right arm, Patient Position: Sitting, Cuff Size: Standard)   Temp 98 °F (36 7 °C) (Temporal)   Wt 74 8 kg (165 lb)   LMP 08/02/2019   BMI 29 23 kg/m²          Physical Exam  Constitutional:       General: She is not in acute distress  Appearance: She is well-developed  She is not diaphoretic  Cardiovascular:      Rate and Rhythm: Normal rate and regular rhythm  Heart sounds: Normal heart sounds  No murmur  No friction rub  No gallop  Pulmonary:      Effort: Pulmonary effort is normal  No respiratory distress  Breath sounds: Normal breath sounds  Abdominal:      General: Bowel sounds are normal  There is no distension  Palpations: Abdomen is soft  Tenderness: There is no abdominal tenderness  There is no guarding or rebound  Neurological:      Mental Status: She is alert and oriented to person, place, and time  Deep Tendon Reflexes: Reflexes are normal and symmetric

## 2020-09-23 PROBLEM — O09.819 PREGNANCY RESULTING FROM IN VITRO FERTILIZATION, ANTEPARTUM: Status: ACTIVE | Noted: 2020-09-23

## 2020-09-23 PROBLEM — R45.89: Status: ACTIVE | Noted: 2020-09-23

## 2020-09-23 PROBLEM — O09.529 ADVANCED MATERNAL AGE IN MULTIGRAVIDA, UNSPECIFIED TRIMESTER: Status: ACTIVE | Noted: 2020-09-23

## 2020-09-23 PROBLEM — F41.1: Status: ACTIVE | Noted: 2020-09-23

## 2020-09-23 PROBLEM — O34.219 HISTORY OF CESAREAN DELIVERY AFFECTING PREGNANCY: Status: ACTIVE | Noted: 2020-09-23

## 2020-09-23 PROBLEM — F41.8 ANXIETY ASSOCIATED WITH DEPRESSION: Status: ACTIVE | Noted: 2020-09-23

## 2020-09-23 PROBLEM — R92.2 DENSE BREAST: Status: RESOLVED | Noted: 2019-08-06 | Resolved: 2020-09-23

## 2020-09-23 PROBLEM — Z01.419 ENCOUNTER FOR GYNECOLOGICAL EXAMINATION (GENERAL) (ROUTINE) WITHOUT ABNORMAL FINDINGS: Status: RESOLVED | Noted: 2019-08-06 | Resolved: 2020-09-23

## 2020-09-23 PROBLEM — R92.30 DENSE BREAST: Status: RESOLVED | Noted: 2019-08-06 | Resolved: 2020-09-23

## 2020-09-23 NOTE — ASSESSMENT & PLAN NOTE
Has one more follow up visit with Dr Aburto Bolus  Preimplantation genetics completed    At this time, continue estradiol and pregesterone as directed by [facility] TORRES

## 2020-09-24 ENCOUNTER — APPOINTMENT (OUTPATIENT)
Dept: LAB | Facility: CLINIC | Age: 45
End: 2020-09-24
Payer: COMMERCIAL

## 2020-09-24 ENCOUNTER — TRANSCRIBE ORDERS (OUTPATIENT)
Dept: LAB | Facility: CLINIC | Age: 45
End: 2020-09-24

## 2020-09-24 DIAGNOSIS — R73.01 IMPAIRED FASTING GLUCOSE: Primary | ICD-10-CM

## 2020-09-24 DIAGNOSIS — R73.01 IMPAIRED FASTING GLUCOSE: ICD-10-CM

## 2020-09-24 DIAGNOSIS — E61.1 IRON DEFICIENCY: ICD-10-CM

## 2020-09-24 DIAGNOSIS — R68.89 MECHANICAL AND MOTOR PROBLEMS WITH INTERNAL ORGANS: ICD-10-CM

## 2020-09-24 LAB
BASOPHILS # BLD AUTO: 0.05 THOUSANDS/ΜL (ref 0–0.1)
BASOPHILS NFR BLD AUTO: 1 % (ref 0–1)
EOSINOPHIL # BLD AUTO: 0.14 THOUSAND/ΜL (ref 0–0.61)
EOSINOPHIL NFR BLD AUTO: 2 % (ref 0–6)
ERYTHROCYTE [DISTWIDTH] IN BLOOD BY AUTOMATED COUNT: 13.8 % (ref 11.6–15.1)
EST. AVERAGE GLUCOSE BLD GHB EST-MCNC: 117 MG/DL
HBA1C MFR BLD: 5.7 %
HCT VFR BLD AUTO: 36.1 % (ref 34.8–46.1)
HGB BLD-MCNC: 11.6 G/DL (ref 11.5–15.4)
IMM GRANULOCYTES # BLD AUTO: 0.03 THOUSAND/UL (ref 0–0.2)
IMM GRANULOCYTES NFR BLD AUTO: 0 % (ref 0–2)
LYMPHOCYTES # BLD AUTO: 1.98 THOUSANDS/ΜL (ref 0.6–4.47)
LYMPHOCYTES NFR BLD AUTO: 21 % (ref 14–44)
MCH RBC QN AUTO: 27.1 PG (ref 26.8–34.3)
MCHC RBC AUTO-ENTMCNC: 32.1 G/DL (ref 31.4–37.4)
MCV RBC AUTO: 84 FL (ref 82–98)
MONOCYTES # BLD AUTO: 0.53 THOUSAND/ΜL (ref 0.17–1.22)
MONOCYTES NFR BLD AUTO: 6 % (ref 4–12)
NEUTROPHILS # BLD AUTO: 6.7 THOUSANDS/ΜL (ref 1.85–7.62)
NEUTS SEG NFR BLD AUTO: 70 % (ref 43–75)
NRBC BLD AUTO-RTO: 0 /100 WBCS
PLATELET # BLD AUTO: 358 THOUSANDS/UL (ref 149–390)
PMV BLD AUTO: 10.5 FL (ref 8.9–12.7)
RBC # BLD AUTO: 4.28 MILLION/UL (ref 3.81–5.12)
T4 FREE SERPL-MCNC: 1.14 NG/DL (ref 0.76–1.46)
TSH SERPL DL<=0.05 MIU/L-ACNC: 0.31 UIU/ML (ref 0.36–3.74)
WBC # BLD AUTO: 9.43 THOUSAND/UL (ref 4.31–10.16)

## 2020-09-24 PROCEDURE — 84443 ASSAY THYROID STIM HORMONE: CPT

## 2020-09-24 PROCEDURE — 36415 COLL VENOUS BLD VENIPUNCTURE: CPT

## 2020-09-24 PROCEDURE — 84439 ASSAY OF FREE THYROXINE: CPT

## 2020-09-24 PROCEDURE — 83036 HEMOGLOBIN GLYCOSYLATED A1C: CPT

## 2020-09-24 PROCEDURE — 85025 COMPLETE CBC W/AUTO DIFF WBC: CPT

## 2020-09-25 ENCOUNTER — TELEPHONE (OUTPATIENT)
Dept: OTHER | Facility: OTHER | Age: 45
End: 2020-09-25

## 2020-09-25 NOTE — TELEPHONE ENCOUNTER
[phone]/ Remi/ BETSY Diaz [phone]/ Wife is having severe migraines, wants to know what to take

## 2020-09-28 NOTE — TELEPHONE ENCOUNTER
Spoke with patient who stated provider reached out and advised  Pt was grateful for the follow up call

## 2020-10-06 ENCOUNTER — TELEMEDICINE (OUTPATIENT)
Dept: OBGYN CLINIC | Facility: CLINIC | Age: 45
End: 2020-10-06

## 2020-10-06 DIAGNOSIS — Z34.81 PRENATAL CARE, SUBSEQUENT PREGNANCY, FIRST TRIMESTER: Primary | ICD-10-CM

## 2020-10-06 PROCEDURE — OBC: Performed by: OBSTETRICS & GYNECOLOGY

## 2020-10-07 DIAGNOSIS — Z83.2 FAMILY HISTORY OF THALASSEMIA: Primary | ICD-10-CM

## 2020-10-08 ENCOUNTER — TELEPHONE (OUTPATIENT)
Dept: OBGYN CLINIC | Facility: CLINIC | Age: 45
End: 2020-10-08

## 2020-10-09 ENCOUNTER — APPOINTMENT (OUTPATIENT)
Dept: LAB | Facility: CLINIC | Age: 45
End: 2020-10-09
Payer: COMMERCIAL

## 2020-10-09 DIAGNOSIS — Z83.2 FAMILY HISTORY OF THALASSEMIA: ICD-10-CM

## 2020-10-09 DIAGNOSIS — Z34.81 PRENATAL CARE, SUBSEQUENT PREGNANCY, FIRST TRIMESTER: ICD-10-CM

## 2020-10-09 LAB
ABO GROUP BLD: NORMAL
BACTERIA UR QL AUTO: ABNORMAL /HPF
BASOPHILS # BLD AUTO: 0.06 THOUSANDS/ΜL (ref 0–0.1)
BASOPHILS NFR BLD AUTO: 1 % (ref 0–1)
BILIRUB UR QL STRIP: NEGATIVE
BLD GP AB SCN SERPL QL: NEGATIVE
CLARITY UR: ABNORMAL
COLOR UR: YELLOW
EOSINOPHIL # BLD AUTO: 0.17 THOUSAND/ΜL (ref 0–0.61)
EOSINOPHIL NFR BLD AUTO: 3 % (ref 0–6)
ERYTHROCYTE [DISTWIDTH] IN BLOOD BY AUTOMATED COUNT: 13.6 % (ref 11.6–15.1)
GLUCOSE UR STRIP-MCNC: NEGATIVE MG/DL
HBV SURFACE AG SER QL: NORMAL
HCT VFR BLD AUTO: 37 % (ref 34.8–46.1)
HGB BLD-MCNC: 11.6 G/DL (ref 11.5–15.4)
HGB UR QL STRIP.AUTO: NEGATIVE
IMM GRANULOCYTES # BLD AUTO: 0.05 THOUSAND/UL (ref 0–0.2)
IMM GRANULOCYTES NFR BLD AUTO: 1 % (ref 0–2)
KETONES UR STRIP-MCNC: NEGATIVE MG/DL
LEUKOCYTE ESTERASE UR QL STRIP: NEGATIVE
LYMPHOCYTES # BLD AUTO: 1.9 THOUSANDS/ΜL (ref 0.6–4.47)
LYMPHOCYTES NFR BLD AUTO: 28 % (ref 14–44)
MCH RBC QN AUTO: 26.5 PG (ref 26.8–34.3)
MCHC RBC AUTO-ENTMCNC: 31.4 G/DL (ref 31.4–37.4)
MCV RBC AUTO: 85 FL (ref 82–98)
MONOCYTES # BLD AUTO: 0.47 THOUSAND/ΜL (ref 0.17–1.22)
MONOCYTES NFR BLD AUTO: 7 % (ref 4–12)
NEUTROPHILS # BLD AUTO: 4.06 THOUSANDS/ΜL (ref 1.85–7.62)
NEUTS SEG NFR BLD AUTO: 60 % (ref 43–75)
NITRITE UR QL STRIP: NEGATIVE
NON-SQ EPI CELLS URNS QL MICRO: ABNORMAL /HPF
NRBC BLD AUTO-RTO: 0 /100 WBCS
PH UR STRIP.AUTO: 7.5 [PH]
PLATELET # BLD AUTO: 328 THOUSANDS/UL (ref 149–390)
PMV BLD AUTO: 11.1 FL (ref 8.9–12.7)
PROT UR STRIP-MCNC: NEGATIVE MG/DL
RBC # BLD AUTO: 4.38 MILLION/UL (ref 3.81–5.12)
RBC #/AREA URNS AUTO: ABNORMAL /HPF
RH BLD: POSITIVE
RPR SER QL: NORMAL
RUBV IGG SERPL IA-ACNC: 93.4 IU/ML
SP GR UR STRIP.AUTO: 1.02 (ref 1–1.03)
UROBILINOGEN UR QL STRIP.AUTO: 0.2 E.U./DL
WBC # BLD AUTO: 6.71 THOUSAND/UL (ref 4.31–10.16)
WBC #/AREA URNS AUTO: ABNORMAL /HPF

## 2020-10-09 PROCEDURE — 87086 URINE CULTURE/COLONY COUNT: CPT

## 2020-10-09 PROCEDURE — 80081 OBSTETRIC PANEL INC HIV TSTG: CPT

## 2020-10-09 PROCEDURE — 83020 HEMOGLOBIN ELECTROPHORESIS: CPT

## 2020-10-09 PROCEDURE — 36415 COLL VENOUS BLD VENIPUNCTURE: CPT

## 2020-10-09 PROCEDURE — 81001 URINALYSIS AUTO W/SCOPE: CPT

## 2020-10-10 LAB — BACTERIA UR CULT: NORMAL

## 2020-10-12 ENCOUNTER — INITIAL PRENATAL (OUTPATIENT)
Dept: OBGYN CLINIC | Facility: CLINIC | Age: 45
End: 2020-10-12

## 2020-10-12 VITALS — BODY MASS INDEX: 28.7 KG/M2 | DIASTOLIC BLOOD PRESSURE: 78 MMHG | SYSTOLIC BLOOD PRESSURE: 118 MMHG | WEIGHT: 162 LBS

## 2020-10-12 DIAGNOSIS — O09.819 PREGNANCY RESULTING FROM IN VITRO FERTILIZATION, ANTEPARTUM: ICD-10-CM

## 2020-10-12 DIAGNOSIS — O34.219 HISTORY OF CESAREAN DELIVERY AFFECTING PREGNANCY: ICD-10-CM

## 2020-10-12 DIAGNOSIS — Z34.91 ENCOUNTER FOR PREGNANCY RELATED EXAMINATION, FIRST TRIMESTER: Primary | ICD-10-CM

## 2020-10-12 LAB — HIV 1+2 AB+HIV1 P24 AG SERPL QL IA: NORMAL

## 2020-10-12 PROCEDURE — 87491 CHLMYD TRACH DNA AMP PROBE: CPT | Performed by: OBSTETRICS & GYNECOLOGY

## 2020-10-12 PROCEDURE — PNV: Performed by: OBSTETRICS & GYNECOLOGY

## 2020-10-12 PROCEDURE — 87591 N.GONORRHOEAE DNA AMP PROB: CPT | Performed by: OBSTETRICS & GYNECOLOGY

## 2020-10-13 LAB
C TRACH DNA SPEC QL NAA+PROBE: NEGATIVE
DEPRECATED HGB OTHER BLD-IMP: 0 %
HGB A MFR BLD: 2.2 % (ref 1.8–3.2)
HGB A MFR BLD: 97.8 % (ref 96.4–98.8)
HGB C MFR BLD: 0 %
HGB F MFR BLD: 0 % (ref 0–2)
HGB FRACT BLD-IMP: NORMAL
HGB S BLD QL SOLY: NEGATIVE
HGB S MFR BLD: 0 %
N GONORRHOEA DNA SPEC QL NAA+PROBE: NEGATIVE

## 2020-10-14 LAB
SL AMB  POCT GLUCOSE, UA: NORMAL
SL AMB POCT URINE PROTEIN: NORMAL

## 2020-10-15 ENCOUNTER — TELEPHONE (OUTPATIENT)
Dept: OBGYN CLINIC | Facility: CLINIC | Age: 45
End: 2020-10-15

## 2020-10-28 ENCOUNTER — TELEPHONE (OUTPATIENT)
Dept: PERINATAL CARE | Facility: CLINIC | Age: 45
End: 2020-10-28

## 2020-10-29 ENCOUNTER — ROUTINE PRENATAL (OUTPATIENT)
Dept: PERINATAL CARE | Facility: OTHER | Age: 45
End: 2020-10-29
Payer: COMMERCIAL

## 2020-10-29 VITALS
BODY MASS INDEX: 30.99 KG/M2 | HEIGHT: 62 IN | DIASTOLIC BLOOD PRESSURE: 74 MMHG | TEMPERATURE: 97.5 F | HEART RATE: 83 BPM | WEIGHT: 168.4 LBS | SYSTOLIC BLOOD PRESSURE: 118 MMHG

## 2020-10-29 DIAGNOSIS — O09.819 PREGNANCY RESULTING FROM IN VITRO FERTILIZATION, ANTEPARTUM: Primary | ICD-10-CM

## 2020-10-29 DIAGNOSIS — Z36.82 ENCOUNTER FOR NUCHAL TRANSLUCENCY TESTING: ICD-10-CM

## 2020-10-29 DIAGNOSIS — Z3A.13 13 WEEKS GESTATION OF PREGNANCY: ICD-10-CM

## 2020-10-29 DIAGNOSIS — Z34.81 PRENATAL CARE, SUBSEQUENT PREGNANCY, FIRST TRIMESTER: ICD-10-CM

## 2020-10-29 DIAGNOSIS — O09.529 ADVANCED MATERNAL AGE IN MULTIGRAVIDA, UNSPECIFIED TRIMESTER: ICD-10-CM

## 2020-10-29 PROCEDURE — 76801 OB US < 14 WKS SINGLE FETUS: CPT | Performed by: OBSTETRICS & GYNECOLOGY

## 2020-10-29 PROCEDURE — 76813 OB US NUCHAL MEAS 1 GEST: CPT | Performed by: OBSTETRICS & GYNECOLOGY

## 2020-10-29 PROCEDURE — 99213 OFFICE O/P EST LOW 20 MIN: CPT | Performed by: OBSTETRICS & GYNECOLOGY

## 2020-10-29 RX ORDER — ASPIRIN 81 MG/1
162 TABLET ORAL DAILY
Qty: 180 TABLET | Refills: 3 | Status: SHIPPED | OUTPATIENT
Start: 2020-10-29 | End: 2021-04-24 | Stop reason: HOSPADM

## 2020-11-05 ENCOUNTER — ROUTINE PRENATAL (OUTPATIENT)
Dept: OBGYN CLINIC | Facility: CLINIC | Age: 45
End: 2020-11-05

## 2020-11-05 VITALS
TEMPERATURE: 97.8 F | DIASTOLIC BLOOD PRESSURE: 72 MMHG | BODY MASS INDEX: 30.87 KG/M2 | WEIGHT: 168.8 LBS | SYSTOLIC BLOOD PRESSURE: 102 MMHG

## 2020-11-05 DIAGNOSIS — O34.219 HISTORY OF CESAREAN DELIVERY AFFECTING PREGNANCY: ICD-10-CM

## 2020-11-05 DIAGNOSIS — O09.529 ADVANCED MATERNAL AGE IN MULTIGRAVIDA, UNSPECIFIED TRIMESTER: ICD-10-CM

## 2020-11-05 DIAGNOSIS — O09.812 ENCOUNTER FOR SUPERVISION OF PREGNANCY RESULTING FROM ASSISTED REPRODUCTIVE TECHNOLOGY IN SECOND TRIMESTER, ANTEPARTUM: Primary | ICD-10-CM

## 2020-11-05 LAB
SL AMB  POCT GLUCOSE, UA: NORMAL
SL AMB POCT URINE PROTEIN: NORMAL

## 2020-11-05 PROCEDURE — PNV: Performed by: NURSE PRACTITIONER

## 2020-11-14 ENCOUNTER — APPOINTMENT (OUTPATIENT)
Dept: LAB | Facility: CLINIC | Age: 45
End: 2020-11-14
Payer: COMMERCIAL

## 2020-11-14 DIAGNOSIS — Z34.81 PRENATAL CARE, SUBSEQUENT PREGNANCY, FIRST TRIMESTER: ICD-10-CM

## 2020-11-14 LAB — GLUCOSE 1H P 50 G GLC PO SERPL-MCNC: 127 MG/DL

## 2020-11-14 PROCEDURE — 36415 COLL VENOUS BLD VENIPUNCTURE: CPT

## 2020-11-14 PROCEDURE — 82950 GLUCOSE TEST: CPT

## 2020-12-09 ENCOUNTER — ROUTINE PRENATAL (OUTPATIENT)
Dept: OBGYN CLINIC | Facility: CLINIC | Age: 45
End: 2020-12-09

## 2020-12-09 VITALS — SYSTOLIC BLOOD PRESSURE: 120 MMHG | BODY MASS INDEX: 32.37 KG/M2 | DIASTOLIC BLOOD PRESSURE: 76 MMHG | WEIGHT: 177 LBS

## 2020-12-09 DIAGNOSIS — O34.219 HISTORY OF CESAREAN DELIVERY AFFECTING PREGNANCY: ICD-10-CM

## 2020-12-09 DIAGNOSIS — O09.812 ENCOUNTER FOR SUPERVISION OF PREGNANCY RESULTING FROM ASSISTED REPRODUCTIVE TECHNOLOGY IN SECOND TRIMESTER, ANTEPARTUM: ICD-10-CM

## 2020-12-09 DIAGNOSIS — O09.529 ADVANCED MATERNAL AGE IN MULTIGRAVIDA, UNSPECIFIED TRIMESTER: ICD-10-CM

## 2020-12-09 DIAGNOSIS — O09.819 PREGNANCY RESULTING FROM IN VITRO FERTILIZATION, ANTEPARTUM: Primary | ICD-10-CM

## 2020-12-09 LAB
SL AMB  POCT GLUCOSE, UA: NEGATIVE
SL AMB POCT URINE PROTEIN: NEGATIVE

## 2020-12-09 PROCEDURE — PNV: Performed by: STUDENT IN AN ORGANIZED HEALTH CARE EDUCATION/TRAINING PROGRAM

## 2020-12-09 RX ORDER — BUPROPION HYDROCHLORIDE 150 MG/1
150 TABLET ORAL
COMMUNITY
Start: 2020-11-23

## 2020-12-09 RX ORDER — BUPROPION HYDROCHLORIDE 300 MG/1
300 TABLET ORAL
COMMUNITY
Start: 2020-11-23 | End: 2021-04-24 | Stop reason: HOSPADM

## 2020-12-09 RX ORDER — AMOXICILLIN 500 MG/1
500 CAPSULE ORAL 3 TIMES DAILY
COMMUNITY
Start: 2020-11-05 | End: 2020-12-30

## 2020-12-10 ENCOUNTER — TELEPHONE (OUTPATIENT)
Dept: PERINATAL CARE | Facility: CLINIC | Age: 45
End: 2020-12-10

## 2020-12-11 ENCOUNTER — TELEPHONE (OUTPATIENT)
Dept: PERINATAL CARE | Facility: OTHER | Age: 45
End: 2020-12-11

## 2020-12-15 ENCOUNTER — TELEPHONE (OUTPATIENT)
Dept: PERINATAL CARE | Facility: OTHER | Age: 45
End: 2020-12-15

## 2020-12-30 ENCOUNTER — ROUTINE PRENATAL (OUTPATIENT)
Dept: PERINATAL CARE | Facility: CLINIC | Age: 45
End: 2020-12-30
Payer: COMMERCIAL

## 2020-12-30 VITALS
SYSTOLIC BLOOD PRESSURE: 117 MMHG | DIASTOLIC BLOOD PRESSURE: 74 MMHG | HEIGHT: 62 IN | BODY MASS INDEX: 33.42 KG/M2 | WEIGHT: 181.6 LBS | HEART RATE: 80 BPM

## 2020-12-30 DIAGNOSIS — Z36.86 ENCOUNTER FOR ANTENATAL SCREENING FOR CERVICAL LENGTH: ICD-10-CM

## 2020-12-30 DIAGNOSIS — O34.219 HISTORY OF CESAREAN DELIVERY AFFECTING PREGNANCY: ICD-10-CM

## 2020-12-30 DIAGNOSIS — Z36.3 ENCOUNTER FOR ANTENATAL SCREENING FOR MALFORMATIONS: ICD-10-CM

## 2020-12-30 DIAGNOSIS — O09.819 PREGNANCY RESULTING FROM IN VITRO FERTILIZATION, ANTEPARTUM: ICD-10-CM

## 2020-12-30 DIAGNOSIS — O09.529 ADVANCED MATERNAL AGE IN MULTIGRAVIDA, UNSPECIFIED TRIMESTER: Primary | ICD-10-CM

## 2020-12-30 PROCEDURE — 76811 OB US DETAILED SNGL FETUS: CPT | Performed by: OBSTETRICS & GYNECOLOGY

## 2020-12-30 PROCEDURE — 99213 OFFICE O/P EST LOW 20 MIN: CPT | Performed by: OBSTETRICS & GYNECOLOGY

## 2020-12-30 PROCEDURE — 76817 TRANSVAGINAL US OBSTETRIC: CPT | Performed by: OBSTETRICS & GYNECOLOGY

## 2021-01-06 ENCOUNTER — ROUTINE PRENATAL (OUTPATIENT)
Dept: OBGYN CLINIC | Facility: CLINIC | Age: 46
End: 2021-01-06

## 2021-01-06 VITALS — DIASTOLIC BLOOD PRESSURE: 74 MMHG | BODY MASS INDEX: 33.65 KG/M2 | WEIGHT: 184 LBS | SYSTOLIC BLOOD PRESSURE: 118 MMHG

## 2021-01-06 DIAGNOSIS — O09.529 ADVANCED MATERNAL AGE IN MULTIGRAVIDA, UNSPECIFIED TRIMESTER: ICD-10-CM

## 2021-01-06 DIAGNOSIS — O09.819 PREGNANCY RESULTING FROM IN VITRO FERTILIZATION, ANTEPARTUM: ICD-10-CM

## 2021-01-06 DIAGNOSIS — Z34.82 ENCOUNTER FOR SUPERVISION OF NORMAL PREGNANCY IN MULTIGRAVIDA IN SECOND TRIMESTER: Primary | ICD-10-CM

## 2021-01-06 DIAGNOSIS — O34.219 HISTORY OF CESAREAN DELIVERY AFFECTING PREGNANCY: ICD-10-CM

## 2021-01-06 LAB
SL AMB  POCT GLUCOSE, UA: NEGATIVE
SL AMB POCT URINE PROTEIN: NEGATIVE

## 2021-01-06 PROCEDURE — PNV: Performed by: STUDENT IN AN ORGANIZED HEALTH CARE EDUCATION/TRAINING PROGRAM

## 2021-01-06 NOTE — PROGRESS NOTES
Pt here for routine ob visit 23wks  No concerns at this time  No LOF,VB,Contractions  +FM   Urine Neg/Neg   28wk lab orders given today

## 2021-01-06 NOTE — PROGRESS NOTES
39 y o  [id number] at 23w0d presents for routine prenatal visit  She denies contractions/leakage of fluid/vaginal bleeding  She feels good fetal movement  Problem List Items Addressed This Visit        Other    History of  delivery affecting pregnancy  Plans for RLTCS    Advanced maternal age in multigravida, unspecified trimester    Pregnancy resulting from in vitro fertilization, antepartum  Fetal echo scheduled      Other Visit Diagnoses     Encounter for supervision of normal pregnancy in multigravida in second trimester    -  Primary  Discussed weight gain - 21 # so far  Recommend 15-25  Recommend healthy choices, exercise    28 wk labs ordered  F/u in 4 wks or PRN

## 2021-01-18 ENCOUNTER — TELEPHONE (OUTPATIENT)
Dept: PERINATAL CARE | Facility: CLINIC | Age: 46
End: 2021-01-18

## 2021-01-18 NOTE — TELEPHONE ENCOUNTER
Attempted to reach patient by phone and left voicemail to confirm appointment for MFM ultrasound  1 support person ( must be over the age of 15) may accompany you for your appointment  If you or your support person have traveled outside the state in the past 2 weeks, please call and notify our office today #[phone]  You and your support person must wear a mask ,covering nose and mouth,during your entire visit  To minimize your exposure in our waiting room, please call our office prior to entering the building  Check in and rooming questions will be done via phone  We will give you directions when to enter for your appointment  Amanda Lorenzo: [phone]    IF you are not feeling well- cough, fever, shortness of breath or any flu like symptoms, contact your primary care physician or [phone]  If you are awaiting COVID 19 test results please call and reschedule your appointment    Any questions with these instructions please call Maternal Fetal Medicine nurse line today @ # [phone]

## 2021-01-19 ENCOUNTER — ROUTINE PRENATAL (OUTPATIENT)
Dept: PERINATAL CARE | Facility: OTHER | Age: 46
End: 2021-01-19
Payer: COMMERCIAL

## 2021-01-19 VITALS
SYSTOLIC BLOOD PRESSURE: 113 MMHG | HEIGHT: 62 IN | HEART RATE: 82 BPM | BODY MASS INDEX: 33.93 KG/M2 | WEIGHT: 184.4 LBS | DIASTOLIC BLOOD PRESSURE: 76 MMHG

## 2021-01-19 DIAGNOSIS — O09.812 PREGNANCY RESULTING FROM IN VITRO FERTILIZATION IN SECOND TRIMESTER: ICD-10-CM

## 2021-01-19 DIAGNOSIS — Z3A.24 24 WEEKS GESTATION OF PREGNANCY: Primary | ICD-10-CM

## 2021-01-19 PROCEDURE — 76825 ECHO EXAM OF FETAL HEART: CPT | Performed by: OBSTETRICS & GYNECOLOGY

## 2021-01-19 PROCEDURE — 93325 DOPPLER ECHO COLOR FLOW MAPG: CPT | Performed by: OBSTETRICS & GYNECOLOGY

## 2021-01-19 PROCEDURE — 76827 ECHO EXAM OF FETAL HEART: CPT | Performed by: OBSTETRICS & GYNECOLOGY

## 2021-01-19 NOTE — PROGRESS NOTES
Charles reported that she did not have the NIPT drawn and brought the KIT back to be discarded properly

## 2021-01-19 NOTE — LETTER
January 19, 2021     Vilma Barrientos [address]  [address] [address]    Patient: Humphrey Mcgraw   YOB: 1975   Date of Visit: 1/19/2021       Dear Dr Missy Laurent: Thank you for referring Sahil Kam to me for evaluation  Below are my notes for this consultation  If you have questions, please do not hesitate to call me  I look forward to following your patient along with you  Sincerely,        Lidia Mccullough MD        CC: No Recipients  Lidia Mccullough MD  1/19/2021  8:07 AM  Sign when Signing Visit  Please refer to the [facility] ultrasound report in Ob Procedures for additional information regarding today's visit

## 2021-01-23 ENCOUNTER — LAB (OUTPATIENT)
Dept: LAB | Facility: CLINIC | Age: 46
End: 2021-01-23
Payer: COMMERCIAL

## 2021-01-23 DIAGNOSIS — Z34.82 ENCOUNTER FOR SUPERVISION OF NORMAL PREGNANCY IN MULTIGRAVIDA IN SECOND TRIMESTER: ICD-10-CM

## 2021-01-23 LAB
BASOPHILS # BLD AUTO: 0.05 THOUSANDS/ΜL (ref 0–0.1)
BASOPHILS NFR BLD AUTO: 1 % (ref 0–1)
EOSINOPHIL # BLD AUTO: 0.08 THOUSAND/ΜL (ref 0–0.61)
EOSINOPHIL NFR BLD AUTO: 1 % (ref 0–6)
ERYTHROCYTE [DISTWIDTH] IN BLOOD BY AUTOMATED COUNT: 15.2 % (ref 11.6–15.1)
GLUCOSE 1H P 50 G GLC PO SERPL-MCNC: 152 MG/DL
HCT VFR BLD AUTO: 32.2 % (ref 34.8–46.1)
HGB BLD-MCNC: 10 G/DL (ref 11.5–15.4)
IMM GRANULOCYTES # BLD AUTO: 0.13 THOUSAND/UL (ref 0–0.2)
IMM GRANULOCYTES NFR BLD AUTO: 1 % (ref 0–2)
LYMPHOCYTES # BLD AUTO: 1.49 THOUSANDS/ΜL (ref 0.6–4.47)
LYMPHOCYTES NFR BLD AUTO: 16 % (ref 14–44)
MCH RBC QN AUTO: 26.7 PG (ref 26.8–34.3)
MCHC RBC AUTO-ENTMCNC: 31.1 G/DL (ref 31.4–37.4)
MCV RBC AUTO: 86 FL (ref 82–98)
MONOCYTES # BLD AUTO: 0.41 THOUSAND/ΜL (ref 0.17–1.22)
MONOCYTES NFR BLD AUTO: 4 % (ref 4–12)
NEUTROPHILS # BLD AUTO: 7.07 THOUSANDS/ΜL (ref 1.85–7.62)
NEUTS SEG NFR BLD AUTO: 77 % (ref 43–75)
NRBC BLD AUTO-RTO: 0 /100 WBCS
PLATELET # BLD AUTO: 251 THOUSANDS/UL (ref 149–390)
PMV BLD AUTO: 11.6 FL (ref 8.9–12.7)
RBC # BLD AUTO: 3.74 MILLION/UL (ref 3.81–5.12)
WBC # BLD AUTO: 9.23 THOUSAND/UL (ref 4.31–10.16)

## 2021-01-23 PROCEDURE — 85025 COMPLETE CBC W/AUTO DIFF WBC: CPT

## 2021-01-23 PROCEDURE — 86592 SYPHILIS TEST NON-TREP QUAL: CPT

## 2021-01-23 PROCEDURE — 36415 COLL VENOUS BLD VENIPUNCTURE: CPT

## 2021-01-23 PROCEDURE — 82950 GLUCOSE TEST: CPT

## 2021-01-25 ENCOUNTER — TELEPHONE (OUTPATIENT)
Dept: OBGYN CLINIC | Facility: CLINIC | Age: 46
End: 2021-01-25

## 2021-01-25 ENCOUNTER — TELEPHONE (OUTPATIENT)
Dept: PERINATAL CARE | Facility: CLINIC | Age: 46
End: 2021-01-25

## 2021-01-25 DIAGNOSIS — R73.09 ABNORMAL GLUCOSE: Primary | ICD-10-CM

## 2021-01-25 LAB — RPR SER QL: NORMAL

## 2021-01-25 NOTE — TELEPHONE ENCOUNTER
Please let Oral Jonathan know that 1 hr glucola is elevated - please order her a 3 hr gtt  She is also mildly anemic - she should take an iron supplement daily - with orange juice to increase absorption   Thanks    Spoke to pt and order placed for 3 hr GTT and add iron

## 2021-01-25 NOTE — TELEPHONE ENCOUNTER
----- Message from Kellee Prasad MD sent at 1/22/2021 11:29 AM EST -----  Regarding: RE: growth scans  Hi Allie,    For now, we will just see her back at 28 weeks  We will schedule her next ultrasound based upon the findings at 28 weeks  Thank you,    Jackie   ----- Message -----  From: Richardine Kawasaki  Sent: 1/20/2021  10:01 AM EST  To: Kellee Prasad MD  Subject: growth scans                                     Good Morning! Cesar Lopez called to schedule growth scans as she said she was going to need to have several more done  Patient is scheduled for a 28 week growth scan already  How often would you like me to schedule her?

## 2021-01-25 NOTE — TELEPHONE ENCOUNTER
Spoke to pt and she had questions about her labs  Reviewed poss 3 hr GTT and process or DP  Pt also says looking at her results she is asking if she is anemic also  Told pt will send to CS to review her labs

## 2021-01-25 NOTE — TELEPHONE ENCOUNTER
Left message requesting patient to call back to inform her of the message from Dr Allen Leon regarding the growth scans

## 2021-01-29 ENCOUNTER — TELEPHONE (OUTPATIENT)
Dept: PERINATAL CARE | Facility: CLINIC | Age: 46
End: 2021-01-29

## 2021-01-29 ENCOUNTER — TELEPHONE (OUTPATIENT)
Dept: OBGYN CLINIC | Facility: CLINIC | Age: 46
End: 2021-01-29

## 2021-01-29 ENCOUNTER — LAB (OUTPATIENT)
Dept: LAB | Facility: HOSPITAL | Age: 46
End: 2021-01-29
Attending: STUDENT IN AN ORGANIZED HEALTH CARE EDUCATION/TRAINING PROGRAM
Payer: COMMERCIAL

## 2021-01-29 DIAGNOSIS — R73.09 ABNORMAL GLUCOSE: ICD-10-CM

## 2021-01-29 DIAGNOSIS — R73.09 ABNORMAL GLUCOSE: Primary | ICD-10-CM

## 2021-01-29 LAB — GLUCOSE P FAST SERPL-MCNC: 100 MG/DL (ref 65–99)

## 2021-01-29 PROCEDURE — 36415 COLL VENOUS BLD VENIPUNCTURE: CPT

## 2021-01-29 PROCEDURE — 82951 GLUCOSE TOLERANCE TEST (GTT): CPT

## 2021-01-29 NOTE — TELEPHONE ENCOUNTER
Demographics:  Language: English  Ethnicity: White/   Country of Origin: [place]    Education/Occupation:  Highest grade completed: Some College  Occupation: OtherNanny  Employer Name:  Hours worked per week: Full Time (40 hours/week)  Shift worked: Morning (7-5pm )  May we contact you at work? no    Personal & Family History:  Personal history of diabetes? no  Family members with diabetes: Mother and grandmother     Pregnancy History:  How many total pregnancies have you had? Other 7  How many children do you have? 2  Have you had a baby weighing larger than 9 lbs? no  Are you having swelling or fluid retention? no  Have you been placed on bedrest? no    Physical Activity:  Do you exercise? no  Type of Exercise: Other no  Frequency of Exercise: Other no    Nutrition Questionnaire: How many meals do you eat daily? 3  List times of meals: Meal # 1: 830am / Meal #2: 12/ Meal #3: 530-6pm   How many snacks do you eat daily? 3  List times of snacks: Other between meals   What type of diet are you following at home? Regular  Do you have special or ethnic dietary preferences? no  Do you have any food allergies or intolerances? no  When was your last meal?   List what you ate and the amounts:     Learning preferences: How do you learn best? Video  How do you rate your health? Good  Who is your primary support person? Spouse  How do you cope with stress?  On meds   Do you have any cultural or Voodoo beliefs we should be aware of? no  How do you feel the diabetes diagnosis will affect the rest of your pregnancy? annoyed  Do you receive WIC or food stamps? no

## 2021-01-29 NOTE — TELEPHONE ENCOUNTER
Pt went for her 3 hour and her fasting was 100  They told her to call the office because they couldn't continue the test  Please advise

## 2021-01-29 NOTE — TELEPHONE ENCOUNTER
Need referral to diabetic pathways  Elevated fasting = gestational diabetes    No need to do rest of test

## 2021-02-03 ENCOUNTER — TELEMEDICINE (OUTPATIENT)
Dept: PERINATAL CARE | Facility: CLINIC | Age: 46
End: 2021-02-03
Payer: COMMERCIAL

## 2021-02-03 DIAGNOSIS — Z3A.27 27 WEEKS GESTATION OF PREGNANCY: ICD-10-CM

## 2021-02-03 DIAGNOSIS — R73.09 ABNORMAL GLUCOSE: Primary | ICD-10-CM

## 2021-02-03 DIAGNOSIS — O24.419 GESTATIONAL DIABETES MELLITUS (GDM) IN SECOND TRIMESTER, GESTATIONAL DIABETES METHOD OF CONTROL UNSPECIFIED: Primary | ICD-10-CM

## 2021-02-03 PROCEDURE — G0108 DIAB MANAGE TRN  PER INDIV: HCPCS

## 2021-02-03 RX ORDER — LANCETS
EACH MISCELLANEOUS
Qty: 100 EACH | Refills: 4 | Status: SHIPPED | OUTPATIENT
Start: 2021-02-03 | End: 2022-01-27 | Stop reason: ALTCHOICE

## 2021-02-03 RX ORDER — BLOOD-GLUCOSE METER
EACH MISCELLANEOUS
Qty: 1 KIT | Refills: 0 | Status: SHIPPED | OUTPATIENT
Start: 2021-02-03 | End: 2022-01-27 | Stop reason: ALTCHOICE

## 2021-02-03 RX ORDER — BLOOD SUGAR DIAGNOSTIC
STRIP MISCELLANEOUS
Qty: 100 EACH | Refills: 4 | Status: SHIPPED | OUTPATIENT
Start: 2021-02-03 | End: 2021-03-31 | Stop reason: SDUPTHER

## 2021-02-03 NOTE — PROGRESS NOTES
Virtual Regular Visit      Assessment/Plan:    Problem List Items Addressed This Visit     None      Visit Diagnoses     Gestational diabetes mellitus (GDM) in second trimester, gestational diabetes method of control unspecified    -  Primary    27 weeks gestation of pregnancy                   Reason for visit is   Chief Complaint   Patient presents with    Gestational Diabetes    Patient Education    Virtual Regular Visit        Encounter provider Madan Gonzalez    Provider located at [address] [place] [address]  [address] [place] [address] [phone]      Recent Visits  Date Type Provider Dept   01/29/21 Telephone [place] Ar[place]ola, [address] [place] [address] Drive recent visits within past 7 days and meeting all other requirements     Today's Visits  Date Type Provider Dept   02/03/21 1401 [address]   Showing today's visits and meeting all other requirements     Future Appointments  No visits were found meeting these conditions  Showing future appointments within next 150 days and meeting all other requirements        The patient was identified by name and date of birth  Nicole Hernandez was informed that this is a telemedicine visit and that the visit is being conducted through [employer] and patient was informed that this is a secure, HIPAA-compliant platform  She agrees to proceed     My office door was closed  No one else was in the room  She acknowledged consent and understanding of privacy and security of the video platform  The patient has agreed to participate and understands they can discontinue the visit at any time  Patient is aware this is a billable service  Subjective  Nicole Hernandez is a 39 y  o pregnant female          HPI     Past Medical History:   Diagnosis Date    Abnormal Pap smear of cervix 2008    Anemia     Anxiety     HPV (human papilloma virus) infection     Miscarriage        Past Surgical History:   Procedure Laterality Date    BUNIONECTOMY Bilateral     BUNIONECTOMY Right 2016     SECTION      [id number]       Current Outpatient Medications   Medication Sig Dispense Refill    aspirin (ECOTRIN LOW STRENGTH) 81 mg EC tablet Take 2 tablets (162 mg total) by mouth daily 180 tablet 3    buPROPion (WELLBUTRIN XL) 150 mg 24 hr tablet TAKE 1 ORAL TABLET ONCE A DAY, ALONG WITH 300MG TABLET FOR A TOTAL OF 450MG DAILY      buPROPion (WELLBUTRIN XL) 300 mg 24 hr tablet TAKE 1 ORAL TABLET ONCE A DAY, ALONG WITH 150MG TABLET FOR A TOTAL OF 450MG DAILY      citalopram (CeleXA) 40 mg tablet Take 40 mg by mouth daily  1    Prenatal MV-Min-Fe Fum-FA-DHA (PRENATAL 1 PO) Take by mouth      VITAMIN D PO Take by mouth       No current facility-administered medications for this visit  No Known Allergies    Review of Systems    Video Exam    There were no vitals filed for this visit  Physical Exam     I spent 60 minutes with patient today in which greater than 50% of the time was spent in counseling/coordination of care regarding gestational diabetes  VIRTUAL VISIT DISCLAIMER    Renetta Metz acknowledges that she has consented to an online visit or consultation  She understands that the online visit is based solely on information provided by her, and that, in the absence of a face-to-face physical evaluation by the physician, the diagnosis she receives is both limited and provisional in terms of accuracy and completeness  This is not intended to replace a full medical face-to-face evaluation by the physician  Renetta Metz understands and accepts these terms  21  Rivas Stubbs   1975  Estimated Date of Delivery: 21   EGA: 27w0d  Referring Provider: Liza Chávez   Thank you for referring your patient to the Diabetes and Pregnancy Program at [facility]   The patient attended class 1 and patient received the following education:     Pathophysiology of diabetes and pregnancy  This includes maternal-fetal complications such as fetal macrosomia,  hypoglycemia, polyhydramnios, increased incidence of  section, pre-term labor and in severe cases, fetal demise and stillbirth   Instruction on diet and glucometer use was provided  Self-monitoring of blood glucose levels: fasting (goal 60mg/dl to 90mg/dl) and two hours after the start of the meal less (goal less than 120mg/dl)  The patient was provided with a Accu-check Guide  blood glucose meter and supplies   Medical Nutrition Therapy for diabetes and pregnancy  The patient was provided with a 2000 calorie meal plan and the following was reviewed:     o Basic review of macronutrients   o Meal pattern should consist of three small meals and three snacks daily  o Carbohydrate gram amounts per meal   o Instructions on how to read a food label  o Appropriate serving sizes for carbohydrates and proteins  o Incorporating protein at each meal and snack  o Maintain a three day food diary and bring to class 2    Report blood glucose levels to the [employer] [address] weekly or as directed:  o Phone : [phone]  If no response in 24 hours, call [phone]   o Fax: [phone]  o [facility]  The patient is scheduled to attend class 2 on 2/15/21     Additionally, fetal ultrasound evaluation by the Perinatologist has been scheduled to assure continuity of care  Patient Stated Goal: "I will eat 3 meals and 3 snacks each day, including protein at each"  Diabetes Self Management Support Plan outside of ongoing care: Spouse/Family    Please contact the Diabetes and Pregnancy Program at [phone] if you have any questions  Time spent with patient 3:00-4:00 M; time spent face to face counseling greater than 50% of the appointment         Satnam Colon RD,LDN,CDE  Diabetes Educator   Diabetes and Pregnancy Program

## 2021-02-10 ENCOUNTER — TELEPHONE (OUTPATIENT)
Dept: PERINATAL CARE | Facility: CLINIC | Age: 46
End: 2021-02-10

## 2021-02-10 NOTE — TELEPHONE ENCOUNTER
--    Attempted to reach patient by phone and left voicemail to confirm appointment for MFM ultrasound  1 support person ( must be over the age of 15) may accompany you for your appointment  If you or your support person have traveled outside the state in the past 2 weeks, please call and notify our office today #[phone]  You and your support person must wear a mask ,covering nose and mouth,during your entire visit  To minimize your exposure in our waiting room, please call our office prior to entering the building  Check in and rooming questions will be done via phone  We will give you directions when to enter for your appointment  Shonda Louise: [phone]    IF you are not feeling well- cough, fever, shortness of breath or any flu like symptoms, contact your primary care physician or [phone]  If you are awaiting COVID 19 test results please call and reschedule your appointment    Any questions with these instructions please call Maternal Fetal Medicine nurse line today @ # [phone]

## 2021-02-11 ENCOUNTER — DOCUMENTATION (OUTPATIENT)
Dept: PERINATAL CARE | Facility: CLINIC | Age: 46
End: 2021-02-11

## 2021-02-11 ENCOUNTER — ULTRASOUND (OUTPATIENT)
Dept: PERINATAL CARE | Facility: OTHER | Age: 46
End: 2021-02-11
Payer: COMMERCIAL

## 2021-02-11 VITALS
HEART RATE: 89 BPM | DIASTOLIC BLOOD PRESSURE: 80 MMHG | WEIGHT: 185.4 LBS | HEIGHT: 62 IN | SYSTOLIC BLOOD PRESSURE: 125 MMHG | BODY MASS INDEX: 34.12 KG/M2

## 2021-02-11 DIAGNOSIS — O24.419 GESTATIONAL DIABETES MELLITUS (GDM) IN THIRD TRIMESTER, GESTATIONAL DIABETES METHOD OF CONTROL UNSPECIFIED: ICD-10-CM

## 2021-02-11 DIAGNOSIS — Z36.89 ENCOUNTER FOR ULTRASOUND TO CHECK FETAL GROWTH: ICD-10-CM

## 2021-02-11 DIAGNOSIS — O09.819 PREGNANCY RESULTING FROM IN VITRO FERTILIZATION, ANTEPARTUM: Primary | ICD-10-CM

## 2021-02-11 PROCEDURE — 76816 OB US FOLLOW-UP PER FETUS: CPT | Performed by: OBSTETRICS & GYNECOLOGY

## 2021-02-11 PROCEDURE — 99213 OFFICE O/P EST LOW 20 MIN: CPT | Performed by: OBSTETRICS & GYNECOLOGY

## 2021-02-11 RX ORDER — DOXYCYCLINE HYCLATE 50 MG/1
324 CAPSULE, GELATIN COATED ORAL
COMMUNITY
End: 2021-03-03

## 2021-02-11 NOTE — PROGRESS NOTES
Date:  21  RE: Ni Currie    : 1975  Estimated Date of Delivery: 21  EGA: 28w1d  OB/GYN: Amanda Lee  Diet controlled gestational diabetes      Via mychart     Current regimen:  2000 calorie GDM diet with 3 meals & 3 snacks  Self-Blood Glucose Monitoring 4 times daily with an Accu-Chek Gudie glucose meter     Plan:  Continue current regimen  Advised to change her bedtime snack to 1 CHO serving (15 gms ) & increase to 2-3 oz protein  21 ultrasound indicated normal growth & fluids  Today's ultrasound results are pending  Scheduled for Class 2 Monday, 2/15/21    Date due to report next:   At Class 2 on Monday, 2/15/21    Shira Joyner, MS, RD, CDE  Diabetes Educator   Diabetes & Pregnancy Program

## 2021-02-11 NOTE — PATIENT INSTRUCTIONS
Thank you for choosing us for your  care today  If you have any questions about your ultrasound or care, please do not hesitate to contact us or your primary obstetrician  Some general instructions for your pregnancy are:     Protect against coronavirus: Pregnant women are increased risk of severe COVID  Continue to practice social distancing, wear a mask, and wash your hands often  Because of the increased risk of pregnancy, you are advised to not attend or host inperson gatherings with people who do not currently live inside your household - this includes birthday parties, gender reveals, baby showers, etc)  Notify your primary care doctor if you have any symptoms including cough, shortness of breath or difficulty breathing, fever, chills, muscle pain, sore throat, or loss of taste or smell  Pregnant women can receive the coronavirus vaccine   Exercise: Aim for 22 minutes per day (150 minutes per week) of regular exercise  Walking is great!  Nutrition: aim for calcium-rich and iron-rich foods as well as healthy sources of protein   Protect against the flu: get yourself and your entire household vaccinated against influenza  This will protect your baby   Learn about Preeclampsia: preeclampsia is a common, serious high blood pressure complication in pregnancy  A blood pressure of 614JTZN (systolic or top number) or 51DRXI (diastolic or bottom number) is not normal and needs evaluation by your doctor   If you smoke, try to reduce how many cigarettes you smoke or quit completely  Do not vape   Other warning signs to watch out for in pregnancy or postpartum: chest pain, obstructed breathing or shortness of breath, seizures, thoughts of hurting yourself or your baby, bleeding, a painful or swollen leg, fever, or headache (see [employer] POST-BIRTH Warning Signs campaign)  If these happen call 911    Itching is also not normal in pregnancy and if you experience this, especially over your hands and feet, potentially worse at night, notify your doctors   Lastly, if you are contacted regarding participation in a survey about your experience in our office, please know that we take any feedback you provide seriously and use it to improve how we deliver care through our center

## 2021-02-12 NOTE — PROGRESS NOTES
[address]: Ms Kelley Epperson was seen today at 28w1d for fetal growth assessment ultrasound  See ultrasound report under "OB Procedures" tab  Please don't hesitate to contact our office with any concerns or questions    Landa Cabot, MD

## 2021-02-15 ENCOUNTER — ROUTINE PRENATAL (OUTPATIENT)
Dept: OBGYN CLINIC | Facility: CLINIC | Age: 46
End: 2021-02-15

## 2021-02-15 ENCOUNTER — TELEMEDICINE (OUTPATIENT)
Dept: PERINATAL CARE | Facility: CLINIC | Age: 46
End: 2021-02-15
Payer: COMMERCIAL

## 2021-02-15 VITALS — DIASTOLIC BLOOD PRESSURE: 82 MMHG | SYSTOLIC BLOOD PRESSURE: 118 MMHG | WEIGHT: 182 LBS | BODY MASS INDEX: 33.29 KG/M2

## 2021-02-15 DIAGNOSIS — O09.529 ADVANCED MATERNAL AGE IN MULTIGRAVIDA, UNSPECIFIED TRIMESTER: ICD-10-CM

## 2021-02-15 DIAGNOSIS — Z3A.28 28 WEEKS GESTATION OF PREGNANCY: ICD-10-CM

## 2021-02-15 DIAGNOSIS — E66.3 OVERWEIGHT WITH BODY MASS INDEX (BMI) OF 29 TO 29.9 IN ADULT: ICD-10-CM

## 2021-02-15 DIAGNOSIS — O24.414 INSULIN CONTROLLED GESTATIONAL DIABETES MELLITUS (GDM) IN THIRD TRIMESTER: Primary | ICD-10-CM

## 2021-02-15 DIAGNOSIS — O09.819 PREGNANCY RESULTING FROM IN VITRO FERTILIZATION, ANTEPARTUM: ICD-10-CM

## 2021-02-15 DIAGNOSIS — O09.813 SUPERVISION OF PREGNANCY RESULTING FROM ASSISTED REPRODUCTIVE TECHNOLOGY, THIRD TRIMESTER: Primary | ICD-10-CM

## 2021-02-15 DIAGNOSIS — O34.219 HISTORY OF CESAREAN DELIVERY AFFECTING PREGNANCY: ICD-10-CM

## 2021-02-15 DIAGNOSIS — O24.414 INSULIN CONTROLLED GESTATIONAL DIABETES MELLITUS (GDM) IN THIRD TRIMESTER: ICD-10-CM

## 2021-02-15 DIAGNOSIS — F41.8 ANXIETY ASSOCIATED WITH DEPRESSION: ICD-10-CM

## 2021-02-15 DIAGNOSIS — R73.09 ABNORMAL GLUCOSE: ICD-10-CM

## 2021-02-15 PROCEDURE — G0108 DIAB MANAGE TRN  PER INDIV: HCPCS | Performed by: DIETITIAN, REGISTERED

## 2021-02-15 PROCEDURE — PNV: Performed by: NURSE PRACTITIONER

## 2021-02-15 RX ORDER — INSULIN GLARGINE 100 [IU]/ML
24 INJECTION, SOLUTION SUBCUTANEOUS
Qty: 15 ML | Refills: 0 | Status: SHIPPED | OUTPATIENT
Start: 2021-02-15 | End: 2021-03-22 | Stop reason: SDUPTHER

## 2021-02-15 NOTE — PROGRESS NOTES
Virtual Regular Visit      Assessment/Plan:    Problem List Items Addressed This Visit     None      Visit Diagnoses     Abnormal glucose                   Reason for visit is   Chief Complaint   Patient presents with    Virtual Regular Visit        Encounter provider Adán Cordero    Provider located at [address] [place] [id number] [phone]      Recent Visits  Date Type Provider Dept   02/10/21 Telephone R Daly 21   Showing recent visits within past 7 days and meeting all other requirements     Future Appointments  No visits were found meeting these conditions  Showing future appointments within next 150 days and meeting all other requirements        The patient was identified by name and date of birth  Sudha Martínez was informed that this is a telemedicine visit and that the visit is being conducted through [employer] and patient was informed that this is a secure, HIPAA-compliant platform  She agrees to proceed     My office door was closed  No one else was in the room  She acknowledged consent and understanding of privacy and security of the video platform  The patient has agreed to participate and understands they can discontinue the visit at any time  Patient is aware this is a billable service  Subjective  Sudha Martínez is a 39 y o  female pregnant patient  HPI     Past Medical History:   Diagnosis Date    Abnormal Pap smear of cervix     Anemia     Anxiety     HPV (human papilloma virus) infection     Miscarriage        Past Surgical History:   Procedure Laterality Date    BUNIONECTOMY Bilateral     BUNIONECTOMY Right 2016     SECTION      [id number]       Current Outpatient Medications   Medication Sig Dispense Refill    Accu-Chek Guide test strip Test 4 times per day  Gestational diabetes   100 each 4    Accu-Chek Softclix Lancets lancets Test 4 times per day  Gestational diabetes  100 each 4    aspirin (ECOTRIN LOW STRENGTH) 81 mg EC tablet Take 2 tablets (162 mg total) by mouth daily 180 tablet 3    Blood Glucose Monitoring Suppl (Accu-Chek Guide Me) w/Device KIT Test 4 times per day  Gestational diabetes 1 kit 0    buPROPion (WELLBUTRIN XL) 150 mg 24 hr tablet TAKE 1 ORAL TABLET ONCE A DAY, ALONG WITH 300MG TABLET FOR A TOTAL OF 450MG DAILY      buPROPion (WELLBUTRIN XL) 300 mg 24 hr tablet TAKE 1 ORAL TABLET ONCE A DAY, ALONG WITH 150MG TABLET FOR A TOTAL OF 450MG DAILY      citalopram (CeleXA) 40 mg tablet Take 40 mg by mouth daily  1    ferrous gluconate (FERGON) 324 mg tablet Take 324 mg by mouth daily with breakfast      Prenatal MV-Min-Fe Fum-FA-DHA (PRENATAL 1 PO) Take by mouth      VITAMIN D PO Take by mouth       No current facility-administered medications for this visit  No Known Allergies    Review of Systems    Video Exam    There were no vitals filed for this visit  Physical Exam --not performed  Time spent with the patient: 90 minutes  VIRTUAL VISIT DISCLAIMER    Ni Kush acknowledges that she has consented to an online visit or consultation  She understands that the online visit is based solely on information provided by her, and that, in the absence of a face-to-face physical evaluation by the physician, the diagnosis she receives is both limited and provisional in terms of accuracy and completeness  This is not intended to replace a full medical face-to-face evaluation by the physician  Ni Currie understands and accepts these terms  DATE:  02/15/21  RE: Ni Currie    : 1975    RUDDY: Estimated Date of Delivery: 21    EGA: 21C2O    Dear Marly LASSITER:    Thank you for referring your patient to the [facility] at [address] [place] [address]    The patient attended Individual Class 2 received the following education via virtual telemedicine with insulin education:    Weight gain during in pregnancy  Based on the patients height of 62   inches, pre-pregnancy weight of 73 9 kg (163 lb) pounds (BMI- 29 81), we would recommend a total weight gain of 15-25 pounds for the pregnancy   The patients current weight is 185  4  pounds, and her weight gain to date is 22 4 pounds  Based on this, we recommend a weight gain of 2 pounds for the remainder of the pregnancy   Medical Nutrition Therapy for diabetes and pregnancy  The patients 24 hour diet recall was reviewed and discussed  The patient was instructed on the following:  o Individualized meal plan  Patient is puzzled with the meal plan & has always been able to eat all types of foods in any amounts  Reports she never followed a diet in the past  Has been under eating from 24 hour diet recall  Has been eating mostly vegetarian type meals  Her protein sources are: shrimp, egg, nuts, nut butter, cheese & beans  Avoids beef, poultry pork & other fish  Under eating CHO foods & lack of protein have increased her FBS  Agreed to eat at least 3 CHO serving (45 gms) at both lunch & dinner  o Use of food diary to maintain a meal plan    o Importance of protein as it relates to blood glucose control  Stressed th importance of eating at least 2 oz protein at all 3 meals    May have to add protein powder to foods   Review of blood glucose log  Reinforcement of blood glucose goals and reporting guidelines   Ultrasounds every four weeks in the [address] to evaluate fetal growth   Exercise Guidelines:   o Walking up to thirty minutes daily can reduce blood glucose levels  Statd she is limited to her walking as has left sided sciatica pain    o Monitor for greater than four contractions per hour     o The patient has been instructed not to begin physical activity if she has been instructed not to exercise by your office   Sick day guidelines and hypoglycemia with treatment     Post-partum guidelines:  o Completion of a 75 gram glucose tolerance test at 6 weeks post-partum to check for type 2 diabetes  o 20% weight loss and 30 minutes of exercise 5 times per week reduces the risk of type 2 diabetes   Breastfeeding guidelines   Report blood glucose levels to [address] weekly or as directed  o Phone: [phone]  If no response in 24 hours, call [phone]    o Fax: [phone]  o Email: [email or web address]    Begin 24 units Basaglar at bedtime (9-10 PM)  Reviewed injection procedure  Discussed storage, refill process, site rotation, obtaining co-pay assistance card, & need for antepartum monitoring  Prescriptions for the HbA1c & CMP were emailed to her  Diabetes Self Management Support Plan outside of ongoing care: Spouse/Family    Please contact the [facility]regnancy Program at [phone] if you have questions  Time spent with patient 9:25-10:55 AM; time spent face to face counseling greater than 50% of the appointment      Sincerely,     Maya Arteaga  Diabetes Educator  [facility]

## 2021-02-15 NOTE — PROGRESS NOTES
Problem List Items Addressed This Visit        Other    History of  delivery affecting pregnancy    Advanced maternal age in multigravida, unspecified trimester    Pregnancy resulting from in vitro fertilization, antepartum    Anxiety associated with depression    Supervision of pregnancy resulting from assisted reproductive technology, third trimester - Primary      Other Visit Diagnoses     Insulin controlled gestational diabetes mellitus (GDM) in third trimester            Feels well  Reports good fetal movement  Blood sugars are NOT wnl so Insulin was advised which she will start asap  Maintaining contact with [facility]  Perinatology appt scheduled for 3/4 for NSTs and FG  Denies LOF/CTX/VB  No additional concerns  Given Yellow Folder  Will meet the Docs so she can schedule her repeat   She received her first COVID vaccine on  at [facility]

## 2021-02-15 NOTE — PATIENT INSTRUCTIONS
Pregnancy at 32 to 30 Weeks   AMBULATORY CARE:   What changes are happening to your body: You may notice new symptoms such as shortness of breath, heartburn, or swelling of your ankles and feet  You may also have trouble sleeping or contractions  Seek care immediately if:   · You develop a severe headache that does not go away  · You have new or increased vision changes, such as blurred or spotted vision  · You have new or increased swelling in your face or hands  · You have vaginal spotting or bleeding  · Your water broke or you feel warm water gushing or trickling from your vagina  Contact your healthcare provider if:   · You have more than 5 contractions in 1 hour  · You notice any changes in your baby's movements  · You have abdominal cramps, pressure, or tightening  · You have a change in vaginal discharge  · You have chills or a fever  · You have vaginal itching, burning, or pain  · You have yellow, green, white, or foul-smelling vaginal discharge  · You have pain or burning when you urinate, less urine than usual, or pink or bloody urine  · You have questions or concerns about your condition or care  How to care for yourself at this stage of your pregnancy:   · Eat a variety of healthy foods  Healthy foods include fruits, vegetables, whole-grain breads, low-fat dairy foods, beans, lean meats, and fish  Drink liquids as directed  Ask how much liquid to drink each day and which liquids are best for you  Limit caffeine to less than 200 milligrams each day  Limit your intake of fish to 2 servings each week  Choose fish low in mercury such as canned light tuna, shrimp, salmon, cod, or tilapia  Do not  eat fish high in mercury such as swordfish, tilefish, doug mackerel, and shark  · Manage heartburn  by eating 4 or 5 small meals each day instead of large meals  Avoid spicy food  · Manage swelling  by lying down and putting your feet up           · Take prenatal vitamins as directed  Your need for certain vitamins and minerals, such as folic acid, increases during pregnancy  Prenatal vitamins provide some of the extra vitamins and minerals you need  Prenatal vitamins may also help to decrease the risk of certain birth defects  · Talk to your healthcare provider about exercise  Moderate exercise can help you stay fit  Your healthcare provider will help you plan an exercise program that is safe for you during pregnancy  · Do not smoke  Smoking increases your risk of a miscarriage and other health problems during your pregnancy  Smoking can cause your baby to be born too early or weigh less at birth  Ask your healthcare provider for information if you need help quitting  · Do not drink alcohol  Alcohol passes from your body to your baby through the placenta  It can affect your baby's brain development and cause fetal alcohol syndrome (FAS)  FAS is a group of conditions that causes mental, behavior, and growth problems  · Talk to your healthcare provider before you take any medicines  Many medicines may harm your baby if you take them when you are pregnant  Do not take any medicines, vitamins, herbs, or supplements without first talking to your healthcare provider  Never use illegal or street drugs (such as marijuana or cocaine) while you are pregnant  Safety tips during pregnancy:   · Avoid hot tubs and saunas  Do not use a hot tub or sauna while you are pregnant, especially during your first trimester  Hot tubs and saunas may raise your baby's temperature and increase the risk of birth defects  · Avoid toxoplasmosis  This is an infection caused by eating raw meat or being around infected cat feces  It can cause birth defects, miscarriages, and other problems  Wash your hands after you touch raw meat  Make sure any meat is well-cooked before you eat it  Avoid raw eggs and unpasteurized milk   Use gloves or ask someone else to clean your cat's litter box while you are pregnant  Changes that are happening with your baby:  By 30 weeks, your baby may weigh more than 3 pounds  Your baby may be about 11 inches long from the top of the head to the rump (baby's bottom)  Your baby's eyes open and close now  Your baby's kicks and movements are more forceful at this time  What you need to know about prenatal care: Your healthcare provider will check your blood pressure and weight  You may also need the following:  · Blood tests  may be done to check for anemia or blood type  · A urine test  may also be done to check for sugar and protein  These can be signs of gestational diabetes or infection  Protein in your urine may also be a sign of preeclampsia  Preeclampsia is a condition that can develop during week 20 or later of your pregnancy  It causes high blood pressure, and it can cause problems with your kidneys and other organs  · A Tdap vaccine and flu vaccine  may be recommended by your healthcare provider  · A gestational diabetes screen  will be done using an oral glucose tolerance test (OGTT)  An OGTT starts with a blood sugar level check after you have not eaten for 8 hours  You are then given a glucose drink  Your blood sugar level is checked after 1 hour, 2 hours, and sometimes 3 hours  Healthcare providers look at how much your blood sugar level increases from the first check  · Fundal height  is a measurement of your uterus to check your baby's growth  This number is usually the same as the number of weeks that you have been pregnant  Your healthcare provider may also check your baby's position  · Your baby's heart rate  will be checked  © Copyright [address] Information is for End User's use only and may not be sold, redistributed or otherwise used for commercial purposes   All illustrations and images included in CareNotes® are the copyrighted property of A D A [employer]  or [place] [employer]  The above information is an  only  It is not intended as medical advice for individual conditions or treatments  Talk to your doctor, nurse or pharmacist before following any medical regimen to see if it is safe and effective for you

## 2021-02-19 ENCOUNTER — DOCUMENTATION (OUTPATIENT)
Dept: PERINATAL CARE | Facility: CLINIC | Age: 46
End: 2021-02-19

## 2021-02-19 NOTE — PROGRESS NOTES
Date:  21  RE: Adarsh Casillas    : 1975  Estimated Date of Delivery: 21  EGA: 29w2d  OB/GYN: Rachel Raw  Insulin controlled gestational diabetes                Via mychart     Current regimen:  Basaglar- 24 units at 9 or 10 pm daily (started 2/15)  2000 calorie GDM diet with 3 meals & 3 snacks  Self-Blood Glucose Monitoring 4 times daily with an Accu-Chek Gudie glucose meter     Plan:  Fasting glucose continues to tredn above goal, multipel PP elevations as well  Advised patient to aim for 45 gm CHO at all 3 meals and increase protein to 4-5 oz  Basaglar- increase from 24 to 28 units at 9 or 10 pm daily  To be titrated  May need to add bolus insulin at next report if consistently above goal    Continue current regimen  Continue bedtime snack to 1 CHO serving (15 gms ) &  2-3 oz protein    21 ultrasound indicated possible macrosomia, HÉCTOR normal    Date due to report next:  Monday, 21 to continue to titrate insulin    Jeramie Jones RD, LDN  Diabetes Educator   [facility]

## 2021-02-24 ENCOUNTER — DOCUMENTATION (OUTPATIENT)
Dept: PERINATAL CARE | Facility: CLINIC | Age: 46
End: 2021-02-24

## 2021-02-24 ENCOUNTER — HOSPITAL ENCOUNTER (OUTPATIENT)
Facility: HOSPITAL | Age: 46
Discharge: HOME | End: 2021-02-24
Attending: STUDENT IN AN ORGANIZED HEALTH CARE EDUCATION/TRAINING PROGRAM | Admitting: STUDENT IN AN ORGANIZED HEALTH CARE EDUCATION/TRAINING PROGRAM
Payer: COMMERCIAL

## 2021-02-24 ENCOUNTER — APPOINTMENT (EMERGENCY)
Dept: CT IMAGING | Facility: HOSPITAL | Age: 46
End: 2021-02-24
Payer: COMMERCIAL

## 2021-02-24 ENCOUNTER — HOSPITAL ENCOUNTER (EMERGENCY)
Facility: HOSPITAL | Age: 46
Discharge: STILL ADMITTED OR ADMINISTRATIVE | End: 2021-02-24
Attending: EMERGENCY MEDICINE | Admitting: EMERGENCY MEDICINE
Payer: COMMERCIAL

## 2021-02-24 ENCOUNTER — APPOINTMENT (EMERGENCY)
Dept: RADIOLOGY | Facility: HOSPITAL | Age: 46
End: 2021-02-24
Payer: COMMERCIAL

## 2021-02-24 VITALS
SYSTOLIC BLOOD PRESSURE: 110 MMHG | DIASTOLIC BLOOD PRESSURE: 73 MMHG | BODY MASS INDEX: 32.6 KG/M2 | WEIGHT: 184 LBS | RESPIRATION RATE: 16 BRPM | HEIGHT: 63 IN | HEART RATE: 80 BPM

## 2021-02-24 VITALS
BODY MASS INDEX: 33.29 KG/M2 | RESPIRATION RATE: 16 BRPM | SYSTOLIC BLOOD PRESSURE: 108 MMHG | DIASTOLIC BLOOD PRESSURE: 74 MMHG | OXYGEN SATURATION: 100 % | TEMPERATURE: 98.3 F | HEIGHT: 62 IN | HEART RATE: 77 BPM

## 2021-02-24 DIAGNOSIS — S09.90XA CLOSED HEAD INJURY, INITIAL ENCOUNTER: ICD-10-CM

## 2021-02-24 DIAGNOSIS — S92.902A CLOSED FRACTURE OF LEFT FOOT, INITIAL ENCOUNTER: Primary | ICD-10-CM

## 2021-02-24 DIAGNOSIS — O9A.219: Primary | ICD-10-CM

## 2021-02-24 DIAGNOSIS — S82.852A LEFT TRIMALLEOLAR FRACTURE, CLOSED, INITIAL ENCOUNTER: ICD-10-CM

## 2021-02-24 PROBLEM — Z3A.30 30 WEEKS GESTATION OF PREGNANCY: Status: ACTIVE | Noted: 2021-02-24

## 2021-02-24 LAB
ALBUMIN SERPL BCP-MCNC: 2.8 G/DL (ref 3.5–5)
ALP SERPL-CCNC: 98 U/L (ref 46–116)
ALT SERPL W P-5'-P-CCNC: 19 U/L (ref 12–78)
ANION GAP SERPL CALCULATED.3IONS-SCNC: 7 MMOL/L (ref 4–13)
AST SERPL W P-5'-P-CCNC: 20 U/L (ref 5–45)
BILIRUB SERPL-MCNC: 0.23 MG/DL (ref 0.2–1)
BUN SERPL-MCNC: 12 MG/DL (ref 5–25)
CALCIUM ALBUM COR SERPL-MCNC: 9.8 MG/DL (ref 8.3–10.1)
CALCIUM SERPL-MCNC: 8.8 MG/DL (ref 8.3–10.1)
CHLORIDE SERPL-SCNC: 103 MMOL/L (ref 100–108)
CO2 SERPL-SCNC: 25 MMOL/L (ref 21–32)
CREAT SERPL-MCNC: 0.54 MG/DL (ref 0.6–1.3)
EST. AVERAGE GLUCOSE BLD GHB EST-MCNC: 108 MG/DL
GFR SERPL CREATININE-BSD FRML MDRD: 114 ML/MIN/1.73SQ M
GLUCOSE P FAST SERPL-MCNC: 76 MG/DL (ref 65–99)
GLUCOSE SERPL-MCNC: 51 MG/DL (ref 65–140)
GLUCOSE SERPL-MCNC: 76 MG/DL (ref 65–140)
GLUCOSE SERPL-MCNC: 79 MG/DL (ref 65–140)
GLUCOSE SERPL-MCNC: 82 MG/DL (ref 65–140)
HBA1C MFR BLD: 5.4 %
POTASSIUM SERPL-SCNC: 3.9 MMOL/L (ref 3.5–5.3)
PROT SERPL-MCNC: 6.7 G/DL (ref 6.4–8.2)
SODIUM SERPL-SCNC: 135 MMOL/L (ref 136–145)

## 2021-02-24 PROCEDURE — NC001 PR NO CHARGE: Performed by: STUDENT IN AN ORGANIZED HEALTH CARE EDUCATION/TRAINING PROGRAM

## 2021-02-24 PROCEDURE — 83036 HEMOGLOBIN GLYCOSYLATED A1C: CPT | Performed by: OBSTETRICS & GYNECOLOGY

## 2021-02-24 PROCEDURE — 82948 REAGENT STRIP/BLOOD GLUCOSE: CPT

## 2021-02-24 PROCEDURE — 99214 OFFICE O/P EST MOD 30 MIN: CPT

## 2021-02-24 PROCEDURE — 80053 COMPREHEN METABOLIC PANEL: CPT | Performed by: OBSTETRICS & GYNECOLOGY

## 2021-02-24 PROCEDURE — 73610 X-RAY EXAM OF ANKLE: CPT

## 2021-02-24 PROCEDURE — 99284 EMERGENCY DEPT VISIT MOD MDM: CPT | Performed by: EMERGENCY MEDICINE

## 2021-02-24 PROCEDURE — 70450 CT HEAD/BRAIN W/O DYE: CPT

## 2021-02-24 PROCEDURE — 99285 EMERGENCY DEPT VISIT HI MDM: CPT

## 2021-02-24 RX ORDER — OXYCODONE HYDROCHLORIDE 5 MG/1
5 TABLET ORAL EVERY 4 HOURS PRN
Status: DISCONTINUED | OUTPATIENT
Start: 2021-02-24 | End: 2021-02-24 | Stop reason: HOSPADM

## 2021-02-24 RX ORDER — OXYCODONE HYDROCHLORIDE 5 MG/1
5 TABLET ORAL EVERY 6 HOURS PRN
Qty: 8 TABLET | Refills: 0 | Status: SHIPPED | OUTPATIENT
Start: 2021-02-24 | End: 2021-03-06 | Stop reason: HOSPADM

## 2021-02-24 RX ORDER — ACETAMINOPHEN 325 MG/1
975 TABLET ORAL EVERY 6 HOURS PRN
Qty: 60 TABLET | Refills: 0 | Status: SHIPPED | OUTPATIENT
Start: 2021-02-24 | End: 2021-03-03

## 2021-02-24 RX ORDER — ACETAMINOPHEN 325 MG/1
975 TABLET ORAL EVERY 6 HOURS PRN
Status: DISCONTINUED | OUTPATIENT
Start: 2021-02-24 | End: 2021-02-24 | Stop reason: HOSPADM

## 2021-02-24 RX ADMIN — ACETAMINOPHEN 975 MG: 325 TABLET, FILM COATED ORAL at 14:00

## 2021-02-24 RX ADMIN — OXYCODONE HYDROCHLORIDE 5 MG: 5 TABLET ORAL at 16:07

## 2021-02-24 NOTE — DISCHARGE INSTRUCTIONS
Please proceed directly to [facility] in the [facility] [employer] [place]  You are being seen under direction of Dr Aranza Guerrero, who is covering for [address] practice today

## 2021-02-24 NOTE — ED PROVIDER NOTES
History  Chief Complaint   Patient presents with    Fall      fall on black ice this morning, reports "thinking i hit my head but i didnt hit my belly" Reports no loss of fluids, positive fetal movement  L ankle pain and swelling   Pregnancy Problem     39 y o  F  30w0d presents after a slip and fall on black ice, mechanical fall, no presyncopal symptoms  This occurred just PTA  She does endorse that she had her head, unclear loss of consciousness which will be considered a loss of consciousness in this case  In addition she injured her left ankle - notably swollen  Neurovascularly intact distal to the injury     She denies trauma to her abdomen  She denies loss of fluid  She denies vaginal bleeding  She has no pain to her abdomen and does endorse fetal movement  Fall  Associated symptoms: no abdominal pain, no back pain, no chest pain, no headaches, no nausea, no neck pain and no vomiting    Pregnancy Problem  Associated symptoms: no abdominal pain, no back pain, no dizziness, no fatigue, no fever, no nausea and no vaginal discharge        Prior to Admission Medications   Prescriptions Last Dose Informant Patient Reported? Taking? Accu-Chek Guide test strip  Self No No   Sig: Test 4 times per day  Gestational diabetes  Accu-Chek Softclix Lancets lancets  Self No No   Sig: Test 4 times per day  Gestational diabetes  Basaglar KwikPen 100 units/mL injection pen  Self No No   Sig: Inject 24 Units under the skin daily at bedtime At 9 or 10 PM daily  Titrate as directed  Okay to substitute Lantus or Levemir  Blood Glucose Monitoring Suppl (Accu-Chek Guide Me) w/Device KIT  Self No No   Sig: Test 4 times per day  Gestational diabetes   Insulin Pen Needle 31G X 5 MM MISC  Self No No   Sig: Use 1 daily     Prenatal MV-Min-Fe Fum-FA-DHA (PRENATAL 1 PO)  Self Yes No   Sig: Take by mouth   VITAMIN D PO  Self Yes No   Sig: Take by mouth   aspirin (ECOTRIN LOW STRENGTH) 81 mg EC tablet  Self No No Sig: Take 2 tablets (162 mg total) by mouth daily   Patient taking differently: Take 162 mg by mouth daily at bedtime    buPROPion (WELLBUTRIN XL) 150 mg 24 hr tablet  Self Yes No   Sig: Take 150 mg by mouth daily at bedtime Takes a total 450 mg every night   buPROPion (WELLBUTRIN XL) 300 mg 24 hr tablet  Self Yes No   Sig: Take 300 mg by mouth daily at bedtime Takes a total of 450mg every night   citalopram (CeleXA) 40 mg tablet  Self Yes No   Sig: Take 40 mg by mouth daily at bedtime       Facility-Administered Medications: None       Past Medical History:   Diagnosis Date    Abnormal Pap smear of cervix     Anemia     Anxiety     Gestational diabetes     HPV (human papilloma virus) infection     Miscarriage        Past Surgical History:   Procedure Laterality Date    BUNIONECTOMY Bilateral     BUNIONECTOMY Right 2016     SECTION      [id number]    OTHER SURGICAL HISTORY      IVF egg retrieval     UT OPEN TX TRIMALLEOLAR ANKLE FX W FIX PST LIP Left 3/5/2021    Procedure: OPEN REDUCTION W/ INTERNAL FIXATION (ORIF) ANKLE;  Surgeon: Brii Moon MD;  Location: [facility];  Service: Orthopedics       Family History   Problem Relation Age of Onset    Diabetes Mother     Hypertension Mother     Prostate cancer Father     Stroke Father     Heart disease Father     Thalassemia Father     Thalassemia Sister     No Known Problems Daughter     No Known Problems Son     Diabetes Maternal Grandmother     Hypertension Maternal Grandmother     Diabetes Maternal Grandfather     Parkinsonism Paternal Grandmother     Stroke Paternal Grandfather     Heart disease Paternal Grandfather     No Known Problems Sister      I have reviewed and agree with the history as documented      E-Cigarette/Vaping    E-Cigarette Use Never User      E-Cigarette/Vaping Substances    Nicotine No     THC No     CBD No     Flavoring No     Other No     Unknown No      Social History     Tobacco Use    Smoking status: Never Smoker    Smokeless tobacco: Never Used   Substance Use Topics    Alcohol use: Not Currently     Frequency: Monthly or less     Comment: social    Drug use: Never       Review of Systems   Constitutional: Negative for chills, fatigue and fever  HENT: Negative for congestion and rhinorrhea  Respiratory: Negative for chest tightness and shortness of breath  Cardiovascular: Negative for chest pain and leg swelling  Gastrointestinal: Negative for abdominal pain, nausea and vomiting  Genitourinary: Negative for vaginal bleeding and vaginal discharge  Currently pregnant   Musculoskeletal: Negative for back pain, gait problem and neck pain  Skin: Negative for wound  Neurological: Positive for syncope  Negative for dizziness, weakness, light-headedness, numbness and headaches  Hematological: Does not bruise/bleed easily  Psychiatric/Behavioral: Negative for confusion  Physical Exam  Physical Exam  Constitutional:       General: She is not in acute distress  Appearance: She is well-developed  She is not diaphoretic  HENT:      Head: Normocephalic  Comments: No external signs of head trauma  Right Ear: External ear normal       Left Ear: External ear normal    Eyes:      General:         Right eye: No discharge  Left eye: No discharge  Conjunctiva/sclera: Conjunctivae normal       Pupils: Pupils are equal, round, and reactive to light  Neck:      Musculoskeletal: Neck supple  Trachea: No tracheal deviation  Comments: No midline tenderness, no step offs  Cardiovascular:      Rate and Rhythm: Normal rate and regular rhythm  Heart sounds: Normal heart sounds  Pulmonary:      Effort: Pulmonary effort is normal       Breath sounds: Normal breath sounds  No stridor  No wheezing  Chest:      Chest wall: No tenderness  Abdominal:      General: There is no distension  Palpations: Abdomen is soft  Tenderness:  There is no abdominal tenderness  There is no guarding  Comments: Stable pelvis  Bedside ultrasound performed, normal fetal heart rate, 139  Normal fetal movement  Musculoskeletal: Normal range of motion  General: Swelling and tenderness (Left ankle) present  No deformity  Comments: Back is non-tender, no step offs   Skin:     General: Skin is warm and dry  Comments: No abrasions, no lacerations, no contusions  Neurological:      Mental Status: She is alert and oriented to person, place, and time  Cranial Nerves: No cranial nerve deficit  Sensory: No sensory deficit ( neurovascular intact distal to her left ankle injury  Pulses intact  Sensation intact  )  Comments: GCS = 15   Psychiatric:         Behavior: Behavior normal          Vital Signs  ED Triage Vitals   Temperature Pulse Respirations Blood Pressure SpO2   02/24/21 0930 02/24/21 0716 02/24/21 0716 02/24/21 0716 02/24/21 0716   98 3 °F (36 8 °C) 86 20 112/74 97 %      Temp Source Heart Rate Source Patient Position - Orthostatic VS BP Location FiO2 (%)   02/24/21 0930 02/24/21 0716 02/24/21 0716 02/24/21 0716 --   Oral Monitor Lying Right arm       Pain Score       --                  Vitals:    02/24/21 0716 02/24/21 0930 02/24/21 1012   BP: 112/74 110/70 108/74   Pulse: 86 80 77   Patient Position - Orthostatic VS: Lying Sitting Lying         Visual Acuity  Visual Acuity      Most Recent Value   L Pupil Size (mm)  3   R Pupil Size (mm)  3          ED Medications  Medications - No data to display    Diagnostic Studies  Results Reviewed     None                 CT head without contrast   ED Interpretation by Hadley Alvarez DO (02/24 8039)   No acute intracranial abnormality  Final Result by Nitza Cruz MD (02/24 7594)      No acute intracranial abnormality                    Workstation performed: [id number]         XR ankle 3+ views LEFT   ED Interpretation by Hadley Alvarez DO (02/24 7510) Trimalleolar fracture      Final Result by Skyler Chowdhury MD (02/24 1015)      Acute mildly displaced trimalleolar fractures            Workstation performed: [id number]                    Procedures  Procedures     Bedside ultrasound performed, single IUP, heart rate 139, normal fetal movement  ED Course  ED Course as of Mar 10 0722   Wed Feb 24, 2021   0726 Advised CT Head because of fall on aspirin  Patient hesitant at this time and wants to wait prior to getting CT Head  No LOC        0744 TT sent to [facility] regarding patient care here and to discuss need for monitoring at [facility]  Dr Russ Farrar is covering at this time      [phone] Patient refuses the need for Tylenol at this time  She is receptive to transfer - will go by POV  0376 TT sent to Dr Dustin Brooks of [facility] for transfer acceptance and to relay care  9546 Pt agreeable to CT Head at this time - discussed risks and benefits  Will get CT Head then have patient go to R for fetal monitoring  MDM  Number of Diagnoses or Management Options  Closed head injury, initial encounter:   Left trimalleolar fracture, closed, initial encounter:   Traumatic injury during pregnancy:   Diagnosis management comments: Mechanical slip and fall on ice, 30 weeks pregnant  CT scan of head is normal   Suffered a left trimalleolar fracture, closed  Normal fetal movement and normal fetal heart rate in the emergency department  Will require fetal monitoring at [address] considering the trauma         Amount and/or Complexity of Data Reviewed  Tests in the radiology section of CPT®: reviewed and ordered        Disposition  Final diagnoses:   Traumatic injury during pregnancy   Left trimalleolar fracture, closed, initial encounter   Closed head injury, initial encounter     Time reflects when diagnosis was documented in both MDM as applicable and the Disposition within this note     Time User Action Codes Description Comment    2/24/2021  9:37 AM MalcolmMaci Add [O9A 219] Traumatic injury during pregnancy     2/24/2021  9:37 AM Jerald Fowler Smithavanessa Add [G92 266V] Left trimalleolar fracture, closed, initial encounter     2/24/2021  9:37 AM SuzicatherineJerald Add [S09 90XA] Closed head injury, initial encounter       ED Disposition     ED Disposition Condition Date/Time Comment    Transfer to Another Facility-In Network  Wed Feb 24, 2021  9:38 AM Kiki Patrick should be transferred out to [facility] by POV to L&D  Accepted by Kecia Evans MD Documentation      Most Recent Value   Patient Condition  The patient has been stabilized such that within reasonable medical probability, no material deterioration of the patient condition or the condition of the unborn child(geremias) is likely to result from the transfer   Reason for Transfer  Level of Care needed not available at this facility [OBGYN]   Benefits of Transfer  Specialized equipment and/or services available at the receiving facility (Include comment)________________________ Bevely Hoop monitoring]   Risks of Transfer  Potential for delay in receiving treatment, Potential deterioration of medical condition, Increased discomfort during transfer, Possible worsening of condition or death during transfer   Accepting Physician  [address] Name, [address] [facility]   Sending MD Fowler   Provider Certification  General risk, such as traffic hazards, adverse weather conditions, rough terrain or turbulence, possible failure of equipment (including vehicle or aircraft), or consequences of actions of persons outside the control of the transport personnel      RN Documentation      Most [facility] Name, [address] L&D   Medications Reviewed with Next Provider of Service  Yes   Transport Mode  Car [pt refused transport via [address] of Medical Records Sent History and Physical, Orders, Progress note, Transfer form, Nursing note   Transfer Date  02/24/21   Transfer Time  1016      Follow-up Information     Follow up With Specialties Details Why Contact Info Additional Information    Vernell Ramirez MD Obstetrics and Gynecology, Obstetrics, Gynecology   [address] [facility]  [address]  [facility] [phone]       [facility] Emergency Department Emergency Medicine  If symptoms worsen: abdominal pain, bleeding, worsening pain, discoloration to your ankle, etc [address] [facility] [address] [facility] Emergency Department, [address] [facility],[address] [facility], [address] [facility], MD Orthopedic Surgery Schedule an appointment as soon as possible for a visit  for further care of ankle fracture [address] [facility]  [address]  [facility] [address]  [phone]             Discharge Medication List as of 2/24/2021  9:46 AM      CONTINUE these medications which have NOT CHANGED    Details   Accu-Chek Guide test strip Test 4 times per day  Gestational diabetes  , Normal      Accu-Chek Softclix Lancets lancets Test 4 times per day  Gestational diabetes  , Normal      aspirin (ECOTRIN LOW STRENGTH) 81 mg EC tablet Take 2 tablets (162 mg total) by mouth daily, Starting Thu 10/29/2020, Normal      Basaglar KwikPen 100 units/mL injection pen Inject 24 Units under the skin daily at bedtime At 9 or 10 PM daily  Titrate as directed  Okay to substitute Lantus or Levemir  , Starting Mon 2/15/2021, Normal      Blood Glucose Monitoring Suppl (Accu-Chek Guide Me) w/Device KIT Test 4 times per day  Gestational diabetes, Normal      !!  buPROPion (WELLBUTRIN XL) 150 mg 24 hr tablet TAKE 1 ORAL TABLET ONCE A DAY, ALONG WITH 300MG TABLET FOR A TOTAL OF 450MG DAILY, Historical Med      !! buPROPion (WELLBUTRIN XL) 300 mg 24 hr tablet TAKE 1 ORAL TABLET ONCE A DAY, ALONG WITH 150MG TABLET FOR A TOTAL OF 450MG DAILY, Historical Med      citalopram (CeleXA) 40 mg tablet Take 40 mg by mouth daily, Starting Sat 6/2/2018, Historical Med      Insulin Pen Needle 31G X 5 MM MISC Use 1 daily  , Normal      Prenatal MV-Min-Fe Fum-FA-DHA (PRENATAL 1 PO) Take by mouth, Historical Med      VITAMIN D PO Take by mouth, Historical Med      ferrous gluconate (FERGON) 324 mg tablet Take 324 mg by mouth daily with breakfast, Historical Med       !! - Potential duplicate medications found  Please discuss with provider  No discharge procedures on file      PDMP Review       Value Time User    PDMP Reviewed  Yes 3/6/2021  7:23 AM Sherrie Oglesby PA-C          ED Provider  Electronically Signed by           Hadley Alvarez DO  03/10/21 8956

## 2021-02-24 NOTE — PROGRESS NOTES
Date:  21  RE: Sterling Moore    : 1975  Estimated Date of Delivery: 21  EGA: 30w0d  OB/GYN: Cheral Barefoot  Insulin controlled gestational diabetes                [employer] [employer]   Patient reported via phone conversation yesterday  dinner 2 hr pp values was 158 mg/dl  21 [phone]    Current regimen:  Basaglar- 28 units at 9 or 10 pm daily (started 2/15)  2000 calorie GDM diet with 3 meals & 3 snacks  Patient was advised to aim for 45 gm CHO at all 3 meals and increase protein to 4-5 oz  Patient reported protein sources are typically shell fish, and some cheese,nuts  Patient dislikes meat  Per phone discussion patient was not always including protein in meals  Self-Blood Glucose Monitoring 4 times daily with an Accu-Chek Gudie glucose meter   Patient is unable to walk due to sciatica pain  Plan:  Fasting glucose continues to trend above goal, multipel PP elevations as well  Basaglar- continue 28 units at 9 or 10 pm daily  To be titrated  Patient was advised on next adjustment of Basaglar the dose can be divided equally into 2 injections, injecting one right after the other, into 2 seperat locations of the body  Discussed adding Humalog- 4 units with dinner meal   Also, discussed adding Metformin as well  Patient to complete lab work work A1c and CMP  Unable to discuss patient's decision to start either Metformin or quick acting insulin due to patient's falling earlier today and being admitted per EMR review  Continue current regimen  Continue bedtime snack to 1 CHO serving (15 gms ) &  2-3 oz protein  21 ultrasound indicated possible macrosomia, HÉCTOR normal    Date due to report next:  Report weekly via [employer] message that flowsheet has been updated  Await patient's decision to either start Metformin or Humalog        Niru Edmonds, MILADYS, LDN, CDE  Diabetes Educator   [facility]

## 2021-02-24 NOTE — PROGRESS NOTES
L&D Triage Note - OB/GYN  Satish Arreola 39 y o  female MRN: [id number]  Unit/Bed#:  TRIAGE 2 Encounter: [id number]      ASSESSMENT:    Satish Arreola is a 39 y o  [id number] at 30w0d presenting s/p fall  PLAN:    1) Extended Monitoring   -3hrs of monitoring   -FHT:  150 bpm/ Moderate 6 - 25 bpm / 10 x 10 accelerations present, no decelerations   -Ray: no contractions    -Oxycodone for foot pain, f/u on Friday     2) Continue routine prenatal care  3) Discharge from [facility] triage with  labor precautions    - Reviewed rupture of membranes, false vs true labor, decreased fetal movement, and vaginal bleeding   - Pt to call provider with any concerns and follow up at her next scheduled prenatal appointment    - Case discussed with Dr Portillo Pruitt:    Satish Arreola 39 y o  [id number] at 30w0d with an Estimated Date of Delivery: 21 presenting s/p fall  Patient states she fell on ice on her way to [facility] to get blood work done  She was seen in [facility] ED and diagnosed with a fracture in her foot that was wrapped  She hit her head and had a CT performed that was negative  She denies abdominal trauma or pain  She has been feeling baby move and has not felt any contractions  She does endorse pain in her foot  This pregnancy is notable for A2GDM  She has had 2 prior C-Sections      Contractions: none  Leakage of fluid: none  Vaginal Bleeding: none  Fetal movement: present    OBJECTIVE:    Vitals:    21 1235   BP: 110/73   Pulse: 80   Resp: 16       ROS:  Constitutional: Negative  Respiratory: Negative  Cardiovascular: Negative    Gastrointestinal: Negative    General Physical Exam:  General: in no apparent distress and well developed and well nourished  Cardiovascular: Cor RRR and No murmurs  Lungs: non-labored breathing  Abdomen: abdomen is soft without significant tenderness, masses, organomegaly or guarding  Lower extremeties: nontender    Cervical Exam  Speculum: deferred  SVE: deferred    Fetal monitoring:  FHT:  150 bpm/ Moderate 6 - 25 bpm / 10 x 10 accelerations present, no decelerations  [place]: no contractions          MD BECKI Mobley PGY-2  2/24/2021 3:52 PM

## 2021-02-24 NOTE — EMTALA/ACUTE CARE TRANSFER
[address]  [address]  [place] [address]  Dept: [phone]      EMTALA TRANSFER CONSENT    NAME Ajit Krishna                                         1975                              MRN [id number]    I have been informed of my rights regarding examination, treatment, and transfer   by Dr Bryson Murphy, *    Benefits: Specialized equipment and/or services available at the receiving facility (Include comment)________________________(fetal monitoring)    Risks: Potential for delay in receiving treatment, Potential deterioration of medical condition, Increased discomfort during transfer, Possible worsening of condition or death during transfer      Consent for Transfer:  I acknowledge that my medical condition has been evaluated and explained to me by the emergency department physician or other qualified medical person and/or my attending physician, who has recommended that I be transferred to the service of  Accepting Physician: Sowmya Trinidad at [address] Name, [facility] : [place]  The above potential benefits of such transfer, the potential risks associated with such transfer, and the probable risks of not being transferred have been explained to me, and I fully understand them  The doctor has explained that, in my case, the benefits of transfer outweigh the risks  I agree to be transferred  I authorize the performance of emergency medical procedures and treatments upon me in both transit and upon arrival at the receiving facility  Additionally, I authorize the release of any and all medical records to the receiving facility and request they be transported with me, if possible  I understand that the safest mode of transportation during a medical emergency is an ambulance and that the Hospital advocates the use of this mode of transport   Risks of traveling to the receiving facility by car, including absence of medical control, life sustaining equipment, such as oxygen, and medical personnel has been explained to me and I fully understand them  (DHRUV CORRECT BOX BELOW)  [  ]  I consent to the stated transfer and to be transported by ambulance/helicopter  [  ]  I consent to the stated transfer, but refuse transportation by ambulance and accept full responsibility for my transportation by car  I understand the risks of non-ambulance transfers and I exonerate the Hospital and its staff from any deterioration in my condition that results from this refusal     X___________________________________________    DATE  21  TIME________  Signature of patient or legally responsible individual signing on patient behalf           RELATIONSHIP TO PATIENT_________________________          Provider Certification    NAME Ajit Krishna                                         1975                              MRN [id number]    A medical screening exam was performed on the above named patient  Based on the examination:    Condition Necessitating Transfer The primary encounter diagnosis was Traumatic injury during pregnancy  Diagnoses of Left trimalleolar fracture, closed, initial encounter and Closed head injury, initial encounter were also pertinent to this visit      Patient Condition: The patient has been stabilized such that within reasonable medical probability, no material deterioration of the patient condition or the condition of the unborn child(geremias) is likely to result from the transfer    Reason for Transfer: Level of Care needed not available at this facility(OBGYN)    Transfer Requirements: [address]   · Space available and qualified personnel available for treatment as acknowledged by    · Agreed to accept transfer and to provide appropriate medical treatment as acknowledged by       [employer]  · Appropriate medical records of the examination and treatment of the patient are provided at the time of transfer STAFF INITIAL WHEN COMPLETED _______  · Transfer will be performed by qualified personnel from    and appropriate transfer equipment as required, including the use of necessary and appropriate life support measures  Provider Certification: I have examined the patient and explained the following risks and benefits of being transferred/refusing transfer to the patient/family:  General risk, such as traffic hazards, adverse weather conditions, rough terrain or turbulence, possible failure of equipment (including vehicle or aircraft), or consequences of actions of persons outside the control of the transport personnel      Based on these reasonable risks and benefits to the patient and/or the unborn child(geremias), and based upon the information available at the time of the patients examination, I certify that the medical benefits reasonably to be expected from the provision of appropriate medical treatments at another medical facility outweigh the increasing risks, if any, to the individuals medical condition, and in the case of labor to the unborn child, from effecting the transfer      X____________________________________________ DATE 02/24/21        TIME_______      ORIGINAL - SEND TO MEDICAL RECORDS   COPY - SEND WITH PATIENT DURING TRANSFER

## 2021-02-24 NOTE — EMTALA/ACUTE CARE TRANSFER
[address]  [address] [place] [address]  Dept: [phone]      EMTALA TRANSFER CONSENT    NAME Tita Stapleton                                         1975                              MRN [id number]    I have been informed of my rights regarding examination, treatment, and transfer   by Dr Rachel Barillas, *    Benefits: Specialized equipment and/or services available at the receiving facility (Include comment)________________________(fetal monitoring)    Risks: Potential for delay in receiving treatment, Potential deterioration of medical condition, Increased discomfort during transfer, Possible worsening of condition or death during transfer      Consent for Transfer:  I acknowledge that my medical condition has been evaluated and explained to me by the emergency department physician or other qualified medical person and/or my attending physician, who has recommended that I be transferred to the service of  Accepting Physician: Kenn Aguero at [address] Name, [address] : [facility]  The above potential benefits of such transfer, the potential risks associated with such transfer, and the probable risks of not being transferred have been explained to me, and I fully understand them  The doctor has explained that, in my case, the benefits of transfer outweigh the risks  I agree to be transferred  I authorize the performance of emergency medical procedures and treatments upon me in both transit and upon arrival at the receiving facility  Additionally, I authorize the release of any and all medical records to the receiving facility and request they be transported with me, if possible  I understand that the safest mode of transportation during a medical emergency is an ambulance and that the Hospital advocates the use of this mode of transport   Risks of traveling to the receiving facility by car, including absence of medical control, life sustaining equipment, such as oxygen, and medical personnel has been explained to me and I fully understand them  (DHRUV CORRECT BOX BELOW)  [  ]  I consent to the stated transfer and to be transported by ambulance/helicopter  [  ]  I consent to the stated transfer, but refuse transportation by ambulance and accept full responsibility for my transportation by car  I understand the risks of non-ambulance transfers and I exonerate the Hospital and its staff from any deterioration in my condition that results from this refusal     X___________________________________________    DATE  21  TIME________  Signature of patient or legally responsible individual signing on patient behalf           RELATIONSHIP TO PATIENT_________________________          Provider Certification    NAME Lexus Vegas                                        [facility] 1975                              MRN [id number]    A medical screening exam was performed on the above named patient  Based on the examination:    Condition Necessitating Transfer The primary encounter diagnosis was Traumatic injury during pregnancy  Diagnoses of Left trimalleolar fracture, closed, initial encounter and Closed head injury, initial encounter were also pertinent to this visit      Patient Condition: The patient has been stabilized such that within reasonable medical probability, no material deterioration of the patient condition or the condition of the unborn child(geremias) is likely to result from the transfer    Reason for Transfer: Level of Care needed not available at this facility(OBGYN)    Transfer Requirements: [address]   · Space available and qualified personnel available for treatment as acknowledged by    · Agreed to accept transfer and to provide appropriate medical treatment as acknowledged by       Ansley Offer  · Appropriate medical records of the examination and treatment of the patient are provided at the time of transfer STAFF INITIAL WHEN COMPLETED _______  · Transfer will be performed by qualified personnel from    and appropriate transfer equipment as required, including the use of necessary and appropriate life support measures  Provider Certification: I have examined the patient and explained the following risks and benefits of being transferred/refusing transfer to the patient/family:  General risk, such as traffic hazards, adverse weather conditions, rough terrain or turbulence, possible failure of equipment (including vehicle or aircraft), or consequences of actions of persons outside the control of the transport personnel      Based on these reasonable risks and benefits to the patient and/or the unborn child(geremias), and based upon the information available at the time of the patients examination, I certify that the medical benefits reasonably to be expected from the provision of appropriate medical treatments at another medical facility outweigh the increasing risks, if any, to the individuals medical condition, and in the case of labor to the unborn child, from effecting the transfer      X____________________________________________ DATE 02/24/21        TIME_______      ORIGINAL - SEND TO MEDICAL RECORDS   COPY - SEND WITH PATIENT DURING TRANSFER

## 2021-02-25 ENCOUNTER — TELEPHONE (OUTPATIENT)
Dept: OTHER | Facility: OTHER | Age: 46
End: 2021-02-25

## 2021-02-25 NOTE — TELEPHONE ENCOUNTER
[phone] / pts boyfriend - Mariana Varshaapolonia / pt Kierar Skill [birth year] / re: 30 weeks pregnant, pt feeling dizzy and lightheaded

## 2021-02-26 ENCOUNTER — OFFICE VISIT (OUTPATIENT)
Dept: OBGYN CLINIC | Facility: CLINIC | Age: 46
End: 2021-02-26
Payer: COMMERCIAL

## 2021-02-26 VITALS
HEIGHT: 63 IN | DIASTOLIC BLOOD PRESSURE: 87 MMHG | SYSTOLIC BLOOD PRESSURE: 126 MMHG | BODY MASS INDEX: 33.12 KG/M2 | HEART RATE: 90 BPM

## 2021-02-26 DIAGNOSIS — S82.852A CLOSED DISPLACED TRIMALLEOLAR FRACTURE OF LEFT ANKLE, INITIAL ENCOUNTER: Primary | ICD-10-CM

## 2021-02-26 PROCEDURE — 99203 OFFICE O/P NEW LOW 30 MIN: CPT | Performed by: ORTHOPAEDIC SURGERY

## 2021-02-26 PROCEDURE — 27818 TREATMENT OF ANKLE FRACTURE: CPT | Performed by: ORTHOPAEDIC SURGERY

## 2021-02-26 RX ORDER — CHLORHEXIDINE GLUCONATE 0.12 MG/ML
15 RINSE ORAL ONCE
Status: CANCELLED | OUTPATIENT
Start: 2021-02-26 | End: 2021-02-26

## 2021-02-26 NOTE — PATIENT INSTRUCTIONS
- Discussed conservative and surgical intervention with patient at length  Patient opted for surgical intervention at this time   - Risks and benefits were discussed with patient at length  Procedure being performed: ORIF left trimalleolar fracture  informed consent was obtained at this time  - Patient will continue with ASA 81mg BID   - Ice / elevate   - Maintain splint at all times  Contact office his splint becomes wet or damaged  - follow-up postop

## 2021-02-26 NOTE — PROGRESS NOTES
Orthopaedics Office Visit - 1st Patient Visit    ASSESSMENT/PLAN:    Assessment:   Trimalleolar fracture Left ankle, displaced, 30 weeks pregnant     Closed manipulation/reduction and splinting performed in office by myself    Placed order for wheelchair today secondary to physical limitations  Wheelchair deemed medically necessary for safe ambulation at home and to and from [facility] with wheelchair access  Plan:   - Discussed conservative and surgical intervention with patient at length  Patient opted for surgical intervention at this time   - Risks and benefits were discussed with patient at length  Procedure being performed: ORIF left trimalleolar fracture  informed consent was obtained at this time - risks include but not limited to infection, nerve/blood vessel damage , symptomatic hardware, malunion, nonunion, ankle arthritis, impaired limb function, loss of limb, pregnancy complications or loss of child/fetus, premature labor, need for additional procedures, death    Lack of reduction of dislocated tibiotalar joint by ED places patient at elevated risk for developing arthritis or impaired limb function in future  - Patient will continue with ASA 81mg BID - she was already taking this for her pregnancy complications  - Ice / elevate   - Maintain splint at all times  Contact office his splint becomes wet or damaged  - follow-up postop  To Do Next Visit:  Georgana Curling op evaluation    _____________________________________________________  CHIEF COMPLAINT:  Chief Complaint   Patient presents with    Left Ankle - Pain       SUBJECTIVE:  Debra Suarez is a 39 y o  female who presents to the office for an initial evaluation for left ankle pain  Patient states that she was walking, slipped on ice and struck her head and twisted her ankle  Patient was taken to the ED where XR were taken and she was placed in a splint secondary to a trimal fracture   NO REDUCTION ATTEMPT WAS MADE AT THIS TIME by ED  Patient is currently 30 weeks pregnant  Patient states that she has been taking tylenol as needed for pain  Patient states that she has been non-weight bearing since the injury  Patient denies any prior history of ankle pain or injuries  Patient denies any numbness tingling the toes currently  Patient offers no other complaints at this time  PAST MEDICAL HISTORY:  Past Medical History:   Diagnosis Date    Abnormal Pap smear of cervix     Anemia     Anxiety     HPV (human papilloma virus) infection     Miscarriage        PAST SURGICAL HISTORY:  Past Surgical History:   Procedure Laterality Date    BUNIONECTOMY Bilateral     BUNIONECTOMY Right 2016     SECTION      [id number]       FAMILY HISTORY:  Family History   Problem Relation Age of Onset    Diabetes Mother     Hypertension Mother     Prostate cancer Father     Stroke Father     Heart disease Father     Thalassemia Father     Thalassemia Sister     No Known Problems Daughter     No Known Problems Son     Diabetes Maternal Grandmother     Hypertension Maternal Grandmother     Diabetes Maternal Grandfather     Parkinsonism Paternal Grandmother     Stroke Paternal Grandfather     Heart disease Paternal Grandfather     No Known Problems Sister        SOCIAL HISTORY:  Social History     Tobacco Use    Smoking status: Never Smoker    Smokeless tobacco: Never Used   Substance Use Topics    Alcohol use: Not Currently     Frequency: Monthly or less     Comment: social    Drug use: Never       MEDICATIONS:    Current Outpatient Medications:     Accu-Chek Guide test strip, Test 4 times per day  Gestational diabetes  , Disp: 100 each, Rfl: 4    Accu-Chek Softclix Lancets lancets, Test 4 times per day  Gestational diabetes  , Disp: 100 each, Rfl: 4    acetaminophen (TYLENOL) 325 mg tablet, Take 3 tablets (975 mg total) by mouth every 6 (six) hours as needed for mild pain, Disp: 60 tablet, Rfl: 0   aspirin (ECOTRIN LOW STRENGTH) 81 mg EC tablet, Take 2 tablets (162 mg total) by mouth daily, Disp: 180 tablet, Rfl: 3    Basaglar KwikPen 100 units/mL injection pen, Inject 24 Units under the skin daily at bedtime At 9 or 10 PM daily  Titrate as directed  Okay to substitute Lantus or Levemir , Disp: 15 mL, Rfl: 0    Blood Glucose Monitoring Suppl (Accu-Chek Guide Me) w/Device KIT, Test 4 times per day  Gestational diabetes, Disp: 1 kit, Rfl: 0    buPROPion (WELLBUTRIN XL) 150 mg 24 hr tablet, TAKE 1 ORAL TABLET ONCE A DAY, ALONG WITH 300MG TABLET FOR A TOTAL OF 450MG DAILY, Disp: , Rfl:     buPROPion (WELLBUTRIN XL) 300 mg 24 hr tablet, TAKE 1 ORAL TABLET ONCE A DAY, ALONG WITH 150MG TABLET FOR A TOTAL OF 450MG DAILY, Disp: , Rfl:     citalopram (CeleXA) 40 mg tablet, Take 40 mg by mouth daily, Disp: , Rfl: 1    Insulin Pen Needle 31G X 5 MM MISC, Use 1 daily  , Disp: 100 each, Rfl: 3    Prenatal MV-Min-Fe Fum-FA-DHA (PRENATAL 1 PO), Take by mouth, Disp: , Rfl:     VITAMIN D PO, Take by mouth, Disp: , Rfl:     ferrous gluconate (FERGON) 324 mg tablet, Take 324 mg by mouth daily with breakfast, Disp: , Rfl:     oxyCODONE (ROXICODONE) 5 mg immediate release tablet, Take 1 tablet (5 mg total) by mouth every 6 (six) hours as needed for severe pain for up to 10 daysMax Daily Amount: 20 mg, Disp: 8 tablet, Rfl: 0    ALLERGIES:  No Known Allergies    REVIEW OF SYSTEMS:  MSK: Left ankle pain  Neuro: WNL  Pertinent items are otherwise noted in HPI  A comprehensive review of systems was otherwise negative      LABS:  HgA1c:   Lab Results   Component Value Date    HGBA1C 5 4 02/24/2021     BMP:   Lab Results   Component Value Date    CALCIUM 8 8 02/24/2021    K 3 9 02/24/2021    CO2 25 02/24/2021     02/24/2021    BUN 12 02/24/2021    CREATININE 0 54 (L) 02/24/2021     CBC: No components found for: CBC    _____________________________________________________  PHYSICAL EXAMINATION:  Vital signs: /87 Pulse 90   Ht 5' 2 5" (1 588 m)   LMP 07/28/2020 (Exact Date)   BMI 33 12 kg/m²   General: No acute distress, awake and alert  Psychiatric: Mood and affect appear appropriate  HEENT: Trachea Midline, No torticollis, no apparent facial trauma  Cardiovascular: No audible murmurs; Extremities appear perfused  Pulmonary: No audible wheezing or stridor  Skin: No open lesions; see further details (if any) below      MUSCULOSKELETAL EXAMINATION:  Left ankle examination:  - diffuse swelling to noted throughout the ankle  Minimal ecchymosis present  - tenderness palpation noted throughout the ankle    - limited range of motion all planes of motion secondary to pain   - NV intact      _____________________________________________________  STUDIES REVIEWED:  I personally reviewed the images and interpretation is as follows:  X-ray left ankle three views:  Acute displaced trimalleolar fracture    PROCEDURES PERFORMED:  Fracture / Dislocation Treatment    Date/Time: 2/26/2021 3:41 PM  Performed by: Kyra Guaman MD  Authorized by: Kyra Guaman MD     Patient Location:  Clinic  Other Assisting Provider: Yes (comment)    Verbal consent obtained?: Yes    Risks and benefits: Risks, benefits and alternatives were discussed    Consent given by:  Patient  Patient states understanding of procedure being performed: Yes    Patient identity confirmed:  Verbally with patient  Injury location:  Ankle  Location details:  Left ankle  Injury type:  Fracture-dislocation  Fracture type: trimalleolar    Neurovascular status: Neurovascularly intact    Distal perfusion: normal    Neurological function: normal    Range of motion: normal    Local anesthesia used?: No    Manipulation performed?: Yes    Skin traction used?: No    Skeletal traction used?: No    Reduction successful?: Yes    Immobilization:  Splint  Splint type:  Short leg (Short leg splint)  Supplies used:  Fiberglass and cotton padding  Neurovascular status: Neurovascularly intact Distal perfusion: normal    Neurological function: normal    Range of motion: normal    Patient tolerance:  Patient tolerated the procedure well with no immediate complications        ELAYNE Linares MD

## 2021-02-26 NOTE — H&P (VIEW-ONLY)
Orthopaedics Office Visit - 1st Patient Visit    ASSESSMENT/PLAN:    Assessment:   Trimalleolar fracture Left ankle, displaced, 30 weeks pregnant     Closed manipulation/reduction and splinting performed in office by myself    Placed order for wheelchair today secondary to physical limitations  Wheelchair deemed medically necessary for safe ambulation at home and to and from [facility] with wheelchair access  Plan:   - Discussed conservative and surgical intervention with patient at length  Patient opted for surgical intervention at this time   - Risks and benefits were discussed with patient at length  Procedure being performed: ORIF left trimalleolar fracture  informed consent was obtained at this time - risks include but not limited to infection, nerve/blood vessel damage , symptomatic hardware, malunion, nonunion, ankle arthritis, impaired limb function, loss of limb, pregnancy complications or loss of child/fetus, premature labor, need for additional procedures, death    Lack of reduction of dislocated tibiotalar joint by ED places patient at elevated risk for developing arthritis or impaired limb function in future  - Patient will continue with ASA 81mg BID - she was already taking this for her pregnancy complications  - Ice / elevate   - Maintain splint at all times  Contact office his splint becomes wet or damaged  - follow-up postop  To Do Next Visit:  Sandra Briceno op evaluation    _____________________________________________________  CHIEF COMPLAINT:  Chief Complaint   Patient presents with    Left Ankle - Pain       SUBJECTIVE:  Nga Lamb is a 39 y o  female who presents to the office for an initial evaluation for left ankle pain  Patient states that she was walking, slipped on ice and struck her head and twisted her ankle  Patient was taken to the ED where XR were taken and she was placed in a splint secondary to a trimal fracture   NO REDUCTION ATTEMPT WAS MADE AT THIS TIME by ED  Patient is currently 30 weeks pregnant  Patient states that she has been taking tylenol as needed for pain  Patient states that she has been non-weight bearing since the injury  Patient denies any prior history of ankle pain or injuries  Patient denies any numbness tingling the toes currently  Patient offers no other complaints at this time  PAST MEDICAL HISTORY:  Past Medical History:   Diagnosis Date    Abnormal Pap smear of cervix     Anemia     Anxiety     HPV (human papilloma virus) infection     Miscarriage        PAST SURGICAL HISTORY:  Past Surgical History:   Procedure Laterality Date    BUNIONECTOMY Bilateral     BUNIONECTOMY Right 2016     SECTION      [id number]       FAMILY HISTORY:  Family History   Problem Relation Age of Onset    Diabetes Mother     Hypertension Mother     Prostate cancer Father     Stroke Father     Heart disease Father     Thalassemia Father     Thalassemia Sister     No Known Problems Daughter     No Known Problems Son     Diabetes Maternal Grandmother     Hypertension Maternal Grandmother     Diabetes Maternal Grandfather     Parkinsonism Paternal Grandmother     Stroke Paternal Grandfather     Heart disease Paternal Grandfather     No Known Problems Sister        SOCIAL HISTORY:  Social History     Tobacco Use    Smoking status: Never Smoker    Smokeless tobacco: Never Used   Substance Use Topics    Alcohol use: Not Currently     Frequency: Monthly or less     Comment: social    Drug use: Never       MEDICATIONS:    Current Outpatient Medications:     Accu-Chek Guide test strip, Test 4 times per day  Gestational diabetes  , Disp: 100 each, Rfl: 4    Accu-Chek Softclix Lancets lancets, Test 4 times per day  Gestational diabetes  , Disp: 100 each, Rfl: 4    acetaminophen (TYLENOL) 325 mg tablet, Take 3 tablets (975 mg total) by mouth every 6 (six) hours as needed for mild pain, Disp: 60 tablet, Rfl: 0   aspirin (ECOTRIN LOW STRENGTH) 81 mg EC tablet, Take 2 tablets (162 mg total) by mouth daily, Disp: 180 tablet, Rfl: 3    Basaglar KwikPen 100 units/mL injection pen, Inject 24 Units under the skin daily at bedtime At 9 or 10 PM daily  Titrate as directed  Okay to substitute Lantus or Levemir , Disp: 15 mL, Rfl: 0    Blood Glucose Monitoring Suppl (Accu-Chek Guide Me) w/Device KIT, Test 4 times per day  Gestational diabetes, Disp: 1 kit, Rfl: 0    buPROPion (WELLBUTRIN XL) 150 mg 24 hr tablet, TAKE 1 ORAL TABLET ONCE A DAY, ALONG WITH 300MG TABLET FOR A TOTAL OF 450MG DAILY, Disp: , Rfl:     buPROPion (WELLBUTRIN XL) 300 mg 24 hr tablet, TAKE 1 ORAL TABLET ONCE A DAY, ALONG WITH 150MG TABLET FOR A TOTAL OF 450MG DAILY, Disp: , Rfl:     citalopram (CeleXA) 40 mg tablet, Take 40 mg by mouth daily, Disp: , Rfl: 1    Insulin Pen Needle 31G X 5 MM MISC, Use 1 daily  , Disp: 100 each, Rfl: 3    Prenatal MV-Min-Fe Fum-FA-DHA (PRENATAL 1 PO), Take by mouth, Disp: , Rfl:     VITAMIN D PO, Take by mouth, Disp: , Rfl:     ferrous gluconate (FERGON) 324 mg tablet, Take 324 mg by mouth daily with breakfast, Disp: , Rfl:     oxyCODONE (ROXICODONE) 5 mg immediate release tablet, Take 1 tablet (5 mg total) by mouth every 6 (six) hours as needed for severe pain for up to 10 daysMax Daily Amount: 20 mg, Disp: 8 tablet, Rfl: 0    ALLERGIES:  No Known Allergies    REVIEW OF SYSTEMS:  MSK: Left ankle pain  Neuro: WNL  Pertinent items are otherwise noted in HPI  A comprehensive review of systems was otherwise negative      LABS:  HgA1c:   Lab Results   Component Value Date    HGBA1C 5 4 02/24/2021     BMP:   Lab Results   Component Value Date    CALCIUM 8 8 02/24/2021    K 3 9 02/24/2021    CO2 25 02/24/2021     02/24/2021    BUN 12 02/24/2021    CREATININE 0 54 (L) 02/24/2021     CBC: No components found for: CBC    _____________________________________________________  PHYSICAL EXAMINATION:  Vital signs: /87 Pulse 90   Ht 5' 2 5" (1 588 m)   LMP 07/28/2020 (Exact Date)   BMI 33 12 kg/m²   General: No acute distress, awake and alert  Psychiatric: Mood and affect appear appropriate  HEENT: Trachea Midline, No torticollis, no apparent facial trauma  Cardiovascular: No audible murmurs; Extremities appear perfused  Pulmonary: No audible wheezing or stridor  Skin: No open lesions; see further details (if any) below      MUSCULOSKELETAL EXAMINATION:  Left ankle examination:  - diffuse swelling to noted throughout the ankle  Minimal ecchymosis present  - tenderness palpation noted throughout the ankle    - limited range of motion all planes of motion secondary to pain   - NV intact      _____________________________________________________  STUDIES REVIEWED:  I personally reviewed the images and interpretation is as follows:  X-ray left ankle three views:  Acute displaced trimalleolar fracture    PROCEDURES PERFORMED:  Fracture / Dislocation Treatment    Date/Time: 2/26/2021 3:41 PM  Performed by: Jennifer Buck MD  Authorized by: Jennifer Buck MD     Patient Location:  Clinic  Other Assisting Provider: Yes (comment)    Verbal consent obtained?: Yes    Risks and benefits: Risks, benefits and alternatives were discussed    Consent given by:  Patient  Patient states understanding of procedure being performed: Yes    Patient identity confirmed:  Verbally with patient  Injury location:  Ankle  Location details:  Left ankle  Injury type:  Fracture-dislocation  Fracture type: trimalleolar    Neurovascular status: Neurovascularly intact    Distal perfusion: normal    Neurological function: normal    Range of motion: normal    Local anesthesia used?: No    Manipulation performed?: Yes    Skin traction used?: No    Skeletal traction used?: No    Reduction successful?: Yes    Immobilization:  Splint  Splint type:  Short leg (Short leg splint)  Supplies used:  Fiberglass and cotton padding  Neurovascular status: Neurovascularly intact Distal perfusion: normal    Neurological function: normal    Range of motion: normal    Patient tolerance:  Patient tolerated the procedure well with no immediate complications        Princess Merlin PA-C - Cate Echeverria MD

## 2021-02-28 PROBLEM — S82.852A CLOSED DISPLACED TRIMALLEOLAR FRACTURE OF LEFT LOWER LEG: Status: ACTIVE | Noted: 2021-02-28

## 2021-03-01 ENCOUNTER — TELEPHONE (OUTPATIENT)
Dept: OBGYN CLINIC | Facility: OTHER | Age: 46
End: 2021-03-01

## 2021-03-01 ENCOUNTER — TELEPHONE (OUTPATIENT)
Dept: OBGYN CLINIC | Facility: CLINIC | Age: 46
End: 2021-03-01

## 2021-03-01 NOTE — TELEPHONE ENCOUNTER
Patient called in needing us to fax the Wheelchair script to [facility]    Please fax to   Fax # [phone]

## 2021-03-01 NOTE — TELEPHONE ENCOUNTER
----- Message from Raphael Esquiveldeann Taveraci sent at 2/27/2021 11:38 AM EST -----  Regarding: Non-Urgent Medical Question  Contact: [phone]  Hi there,     I'm not sure if you were notified but on Wednesday I slipped on black ice, hit my head and wound up breaking my ankle  It's been impossible for me to walk so I'm basically in bed unless I have to go to the bathroom  I went to the [email or web address] and was monitored all day and baby is ok  Yesterday I went to see Dr Ladan Lazo (orthopedic surgeon with [facility])  He said I need surgery ASAP and that it can't wait til after baby  He said he would have to touch base with you, and the anesthesiologist to figure the safest way to do this  What do you suggest? I worry about it and wondered if the baby will be monitored during surgery  Just need to get your thoughts  The recovery will be rough and I will probably be immobile when baby comes      Faheem Cason

## 2021-03-02 ENCOUNTER — TELEPHONE (OUTPATIENT)
Dept: OBGYN CLINIC | Facility: HOSPITAL | Age: 46
End: 2021-03-02

## 2021-03-02 ENCOUNTER — ANESTHESIA EVENT (OUTPATIENT)
Dept: PERIOP | Facility: HOSPITAL | Age: 46
End: 2021-03-02
Payer: COMMERCIAL

## 2021-03-02 ENCOUNTER — PREP FOR PROCEDURE (OUTPATIENT)
Dept: OBGYN CLINIC | Facility: CLINIC | Age: 46
End: 2021-03-02

## 2021-03-02 DIAGNOSIS — S82.852A CLOSED DISPLACED TRIMALLEOLAR FRACTURE OF LEFT ANKLE, INITIAL ENCOUNTER: Primary | ICD-10-CM

## 2021-03-02 RX ORDER — CHLORHEXIDINE GLUCONATE 4 G/100ML
SOLUTION TOPICAL DAILY PRN
Status: CANCELLED | OUTPATIENT
Start: 2021-03-02

## 2021-03-02 NOTE — PATIENT INSTRUCTIONS
MELIZA Rubio  Attending, [address] [place] [address] Office Phone: [phone] ? Fax: [phone]  [place] Office Phone: [phone] ? [facility]:[phone]    : Leandrew Osler) [place], [place]     Surgery Coordinators [facility]: [facility], [address] [facility], [phone]  Surgery Coordinator [place]:  Mireille [address], [phone]  www [email or web address]   PRE-OPERATIVE AND POST-OPERATIVE INSTRUCTIONS    General Information:   Your surgery is with Dr Renny Bacon  Dates can change (although rare) depending on emergencies   Typical post operative visits are at the following intervals:  3 weeks post surgery(except 1 week for bunions and wound monitoring), 6 weeks post surgery, 3 months post surgery, 6 months post surgery, and then on a yearly basis  However, this may change based on Dr Chaudhry Mu recommendation   #1 post-operative rule for foot/ankle surgery:  ONCE YOU ARE OUT OF YOUR CAST AND/OR REMOVABLE BOOT, SWELLING MAY PERSIST FOR MANY MONTHS  YOU MIGHT ALSO EXPERIENCE A BLUISH DISCOLORATION OF YOUR LEG  THIS IS NORMAL AND PART OF THE USUAL POSTOPERATIVE EXPERIENCE  SMOKING:   Smoking results in incomplete healing of fractures (broken bones) and joints that my have been fused  Smoking and nicotine also prevents the growth of bone into ankle replacements and bone healing  It also slows the healing of muscles and skin (soft tissue)  Therefore, please do not have surgery if you continue to smoke  We reserve the right to cancel your surgery if we suspect that you are smoking  DO NOT use nicorette gum or other patches  Please find an alternative method to quit smoking before your surgery  Pre-Operative Information:   Surgery date and preoperative visits:  a   If you have medical problems, such as an abnormal EKG, history of BLOOD CLOT, ANEURYSM, and any other heart condition, please inform us so that we can get your medical clearance several weeks before the surgery  Please bring any important medical information, such as an EKG, chest x-ray, or echocardiogram, with you to ensure that your surgery will not be delayed  b  If needed, you will receive your preoperative appointments in the mail or by phone from our scheduling office  The location of the preoperative appointment will be given to you also   c  You may not eat after midnight the night before surgery  If you do, your surgery will be cancelled  d   Riccardo Johnson will receive a phone call from your surgery center the day before your surgery (if your surgery is on a Monday, you will get a call the Friday before)  If you do not hear from someone by 4pm the day before your surgery, please call the Surgical coordinator (number above) to notify us   e  Start taking Vitamin D3 4000 units per day and Calcium 1200mg per day immediately  You will continue this until your 3 month post-op visit  These are over the counter and available at all pharmacies and supermarkets  f  FOR THOSE HAVING SURGERY AT [address] [place] [address] WILL NEED CRUTCHES OR A ROLLING WALKER AFTER SURGERY, ASK FOR A PRESCRIPTION FOR THIS FROM OUR OFFICE TODAY  THIS CANNOT BE HANDLED THE DAY OF SURGERY AS [facility] DOES NOT STOCK THESE   Because bacterial can often enter any defect in the skin, it is important to avoid any cuts before surgery  Any breaks in the skin on the leg will often result in your surgery being postponed  Please avoid going on a very long walk the day prior to surgery, or doing other activities that could lead to irritation of the skin, including yard work, extra athletic activity, or shaving  This could result in surgery cancellation   You MUST be fasting the day of your surgery    Therefore, please do not consume any foot or beverage after midnight the night before surgery  The morning of surgery you may take your usual medications with a sip of water   It is important not to take anti-inflammatory medication like Ibuprofen, Motrin, Naproxen (Aleve), or Aspirin 7-10 days before surgery because they will make you bleed more than usual   Vitamin, E, Plavix and Coumadin also have the same effect  Stop Aspirin and Vitamin E two weeks before surgery  YOUR MEDICAL DOCTOR SHOULD TELL YOU WHEN TO STOP COUMADIN OR PLAVIX   If your surgery involves any bone healing, please do not take anti-inflammatories for at least 6 weeks after surgery  This can impede bone healing (ibuprofen, Aleve, Relafen, iodine)  Tylenol is fine to take  PREOPERATIVE BATHING INSTRUCTIONS:     Before your surgery, bathe with Hibiclens (4% Chlorhexidene) as instructed below  This skin cleanser will help reduce the bacteria on your skin before surgery  To avoid irritating your eyes, do not apply Hibiclens above the level of your neck   o On the evening before AND the morning of surgery, bathe your entire body except the face and scalp, then rinse freely  o DO NOT apply to your face or scalp, as Hibiclens can irritate your eyes   Purchasing information:   Hibiclens is available without a prescription at [facility]  ADDITIONAL INSTRUCTIONS:  PATIENTS HAVING FOOT/ANKLE SURGERY     In preparation for your upcoming surgery, we kindly request and advise the following:   Notify our office if you are taking any of the following:  Coumadin (warfarin):  Persantine (dipyridamole); Pletal (cilostazol); Plavix (clopidogrel); Ticlid (ticlopidine); Agrylin (anagrelide); Aggrenox (dipyridamole and aspirin) or other blood thinners,   In addition, stop taking Vitamin E and herbal supplements   Do not schedule any elective dental work for at least 6 months after surgery    If you had an ankle replacement, you will need to take antibiotics before any future dental procedures  Your dentist or our office can prescribe these for you  1000mg of Amoxicillin 1 hour prior to any dental procedure is the recommended dosing  THREE RULES:    1  After surgery you will most likely be given the instructions KEEP YOUR TOES ABOVE YOUR NOSE    This means that you MUST have your feet elevated higher than your heart  Keeping your toes above your nose helps to heal the muscles and skin (soft tissues) by reducing swelling in your leg  This position also helps to prevent infection, and is very important in avoiding deep venous thrombosis (blood clots)  2  In order to keep the blood circulating in your legs and in order to avoid deep vein   thrombosis (blood clots), we ask patients to GET UP ONCE AN HOUR during the day  This means you should at least cross the room and come back  It does not mean you have to be up for long periods of time  In most cases we will not have people immediately put any weight on their operated part  This is important to prevent loosening of metal or other devices holding the bones together  It also prevents irritation of the soft tissues which can lead to prolonged healing  When we say get up once an hour, please walk, hop or move with an assisted device  This is important! 3  Do not do any excessive walking during the first few days after surgery  Recovering from surgery is a full-time task for the patient  Postoperative care is important to avoid irritating the skin incision, which can lead to infection  Please do not plan activities or go out of town for several weeks after surgery  If you are unsure about your future activities, please schedule surgery only when you know it is acceptable for you  Scheduling surgery and then canceling the date, prevents other people from having surgery on that date as it takes time to line everything up effectively    If you cancel your surgery the week of your planned surgery, we reserve the right to cancel all future surgical procedures  THE DAY OF SURGERY:     Arrival to the hospital or outpatient surgical center on time is imperative  If you arrive late, then your surgery will be cancelled  You MUST have a family member/friend bring you, stay with you throughout the DURATION of your surgery, and drive you home   You MUST be fasting the day of your surgery  Therefore, do not consume any food or beverage after midnight the night before surgery  At your pre-operative visit with the anesthesia staff, or during your phone screen, a nurse will instruct you what medications you will need to take the day of surgery   MAKE SURE THAT THE PHARMACY LISTED IN THE ELECTRONIC MEDICAL RECORD (EPIC) IS YOUR PREFERRED PHARMACY  For example, if you are staying with family or a friend, and will not be near your preferred pharmacy, YOU MUST, tell the nurses checking you in the day of surgery so that this can be changed in the system  If your prescriptions are sent to a pharmacy, this cannot be changed  AFTER YOUR SURGERY:   Bleeding through the bandage almost always occurs  Do not let this alarm you  Simply add more gauze or a towel, call us, and come in for a dressing change  If you think it is excessive, contact us immediately or go to the local emergency room   Do not get the bandage wet  Showering is possible with plastic protectors  Be very careful, as the bathroom can be wet and slippery  If you do get your dressing wet, it should be changed immediately  Please contact us   ONCE YOUR ARE OUT OF YOUR CAST AND/OR REMOVABLE BOOT, SWELLING MAY PERSIST FOR MANY MONTHS  YOU MIGHT ALSO EXPERIENCE A BLUISH DISCOLORATION OF YOUR LEG  THIS IS NORMAL AND PART OF THE USUAL POSTOPERATIVE EXPERIENCE  WEARING COMPRESSION HOSE (ELASTIC STOCKINGS) CAN HELP AVOID SOME OF THIS SWELLING        DRESSING:   The purpose of the surgical dressing is to keep your wound and the surgical site protected from the environment  Most dressings contain splints, which help to hold your foot and ankle in a corrected position, and also allow the surgical site to heal properly  Dressings will remain in place and undisturbed until the first postop visit  If you have a drain in place, this will need to be removed in 1-3 days after surgery  The time for the drain to be pulled will be written on your discharge instruction sheet  CAST  INSTRUCTIONS:  You may or may not get a cast following surgery  If you do, pay close attention to the following:     After application of a splint or cast, it is very important to elevate your leg for 24 to 72 hours  The injured area should be elevated well above the heart  Remember Toes above your Nose  Rest and elevation greatly reduce pain and speed the healing process by minimizing early swelling  CALL YOUR DOCTORS OFFICE OR VISIT LOCATION EMERGENCY ROOM IF YOU HAVE ANY OF THE FOLLOWING:     Significant increased pain, which may be caused by swelling, and the feeling that the splint or cast is too tight   Numbness and tingling in your hand or foot, which may be caused by too much pressure on the nerves   Burning and stinging, which may be caused by too much pressure on the skin   Excessive swelling below the cast, which may mean the cast is slowing your blood circulation   Loss of active movement of toes, which request an urgent evaluation   Loss of capillary refill  Pinch the tip of toes and maggie the skin  Release pressure and if the skin does not return pink then call the office immediately  DO NOT GET YOUR CAST WET  Bacteria thrive in moist dark areas  We do not want this  If your cast becomes wet, return to the office and we will apply another one  PAIN AFTER SURGERY:  Narcotic pain medication can and will depress your respiratory system if taken in excess  The goal of pain management with narcotics is to be comfortable not pain free    If you take enough narcotics to be pain free then you run the risk of stopping breathing  If this happens, call 911 immediately!  Pain in the heel is often  caused by pressure from the weight of your foot on the bed  Make sure your heel is suspended off the bed by keeping a pillow underneath your calf not your knee  Medications: You will be given narcotic pain medication  Do NOT drive while taking narcotic medications  Medications such as Darvocet, Percocet, Vicoden or Tylenol #3, also contain acetaminophen (Tylenol)  Do not take acetaminophen or Tylenol from home when taking theses medications  When you fill your prescription, you may ask the pharmacist if your pain medication has acetaminophen/Tylenol in it  It is okay to take Tylenol with Oxycontin/Oxycodone  Should you have pain after taking your prescription medication, ibuprophen (Motrin, Advil, and Alleve) is a common over the counter preparation and may often be taken with the prescription pain medication as long as you take them with food  These medications can irritate the stomach lining  Unless you are allergic to aspirin or currently taking a blood thinner, Dr Bailey Reza patients are requested to take one 325 mg aspirin every 12 hours until you are back to walking normally after surgery (This can be up to 6 weeks)  Narcotic medications commonly cause nausea  Taking them with food will decrease this side effect  If you are having extreme nausea, please contact us for an alternative medication or for something that can be taken with this medication to decrease the nausea  Also, narcotic medications frequently cause constipation  An increase of fiber, fruits and vegetables in your diet may alleviate this problem, or if necessary, you may use an over-the-counter medication such as senekot, colace, or Fibercon for constipation problems  You should resume all medications you were taking prior to the surgery unless otherwise specified       Activity:   Because of your recent foot surgery, your activity level will decrease  You will need to elevate your foot ABOVE the level of your heart for a minimum of four days  The length of time necessary for the swelling to go down, and for your wounds to heal properly depends greatly on your efforts here  Elevation is extremely important to avoid compromising the blood supply to your foot  Remember when your foot is down it will swell, which will increase pain and slow healing  Wiggle your toes frequently if possible  If you go home with a regional block, (a type of anesthesia) the foot and leg will be numb  Think of ways to get into your house and around the house until the block wears off  Keep in mind that it may be a legal issue if you drive while in a cast or splint, especially when the splint is on the right foot  You may call the [employer] to schedule a road test if you have adaptive equipment applied to your car  The amount of weight you are allowed to bear on your foot will be written on your discharge sheet filled out at the time of surgery  The following is an explanation of the possibilities:     Non-weight bearing: You are to put NO weight whatsoever on your foot  When using crutches or a walker, your foot should not touch the ground, except when you are standing  Then, it may rest on the ground  If you are to be non-weight bearing, and you are not compliant, you could compromise the surgery  Some of our patients have been requesting prescriptions for a roll-a-bout knee scooter  [employer] and other insurances have been denying these claims, and you may either have to rent one or pay out of pocket to purchase one  THIS SHOULD BE PURCHASED PRIOR TO THE SURGERY AND YOU SHOULD BRING IT WITH YOU THE DAY OF THE SURGERY TO AIDE IN GETTING FROM THE CAR INTO THE HOUSE AFTER SURGERY

## 2021-03-02 NOTE — TELEPHONE ENCOUNTER
Father states there is information missing for wheel chair from [facility] fax [phone]    Missing patient height and weight

## 2021-03-02 NOTE — TELEPHONE ENCOUNTER
Patient sees Dr Anselmo Man called on behalf of patient stating they misplaced paperwork with instructions on how to clean the patient's incision  Nikolai Layne will be in the office tomorrow to  a new copy      Nikolai Layne call back # [phone]

## 2021-03-03 ENCOUNTER — TELEPHONE (OUTPATIENT)
Dept: PERINATAL CARE | Facility: CLINIC | Age: 46
End: 2021-03-03

## 2021-03-03 ENCOUNTER — OFFICE VISIT (OUTPATIENT)
Dept: PERINATAL CARE | Facility: CLINIC | Age: 46
End: 2021-03-03
Payer: COMMERCIAL

## 2021-03-03 ENCOUNTER — TELEPHONE (OUTPATIENT)
Dept: OBGYN CLINIC | Facility: CLINIC | Age: 46
End: 2021-03-03

## 2021-03-03 ENCOUNTER — TELEPHONE (OUTPATIENT)
Dept: OTHER | Facility: OTHER | Age: 46
End: 2021-03-03

## 2021-03-03 ENCOUNTER — DOCUMENTATION (OUTPATIENT)
Dept: PERINATAL CARE | Facility: CLINIC | Age: 46
End: 2021-03-03

## 2021-03-03 DIAGNOSIS — Z3A.31 31 WEEKS GESTATION OF PREGNANCY: Primary | ICD-10-CM

## 2021-03-03 DIAGNOSIS — T14.8XXA FRACTURE: ICD-10-CM

## 2021-03-03 DIAGNOSIS — O99.810 HYPERGLYCEMIA IN PREGNANCY: Primary | ICD-10-CM

## 2021-03-03 DIAGNOSIS — Z74.09 IMMOBILITY: ICD-10-CM

## 2021-03-03 DIAGNOSIS — O09.213 RISK OF PRETERM LABOR IN THIRD TRIMESTER: ICD-10-CM

## 2021-03-03 PROCEDURE — 99214 OFFICE O/P EST MOD 30 MIN: CPT | Performed by: OBSTETRICS & GYNECOLOGY

## 2021-03-03 PROCEDURE — 96372 THER/PROPH/DIAG INJ SC/IM: CPT

## 2021-03-03 RX ORDER — INSULIN LISPRO 100 [IU]/ML
4 INJECTION, SOLUTION INTRAVENOUS; SUBCUTANEOUS
Qty: 15 ML | Refills: 0 | Status: SHIPPED | OUTPATIENT
Start: 2021-03-03 | End: 2021-04-24 | Stop reason: HOSPADM

## 2021-03-03 RX ORDER — BETAMETHASONE SODIUM PHOSPHATE AND BETAMETHASONE ACETATE 3; 3 MG/ML; MG/ML
12 INJECTION, SUSPENSION INTRA-ARTICULAR; INTRALESIONAL; INTRAMUSCULAR; SOFT TISSUE DAILY
Qty: 4 ML | Refills: 0 | Status: SHIPPED | OUTPATIENT
Start: 2021-03-03 | End: 2021-03-05

## 2021-03-03 RX ORDER — BETAMETHASONE SODIUM PHOSPHATE AND BETAMETHASONE ACETATE 3; 3 MG/ML; MG/ML
12 INJECTION, SUSPENSION INTRA-ARTICULAR; INTRALESIONAL; INTRAMUSCULAR; SOFT TISSUE EVERY 24 HOURS
Status: DISCONTINUED | OUTPATIENT
Start: 2021-03-03 | End: 2021-03-04 | Stop reason: HOSPADM

## 2021-03-03 RX ADMIN — BETAMETHASONE SODIUM PHOSPHATE AND BETAMETHASONE ACETATE 12 MG: 3; 3 INJECTION, SUSPENSION INTRA-ARTICULAR; INTRALESIONAL; INTRAMUSCULAR at 15:55

## 2021-03-03 NOTE — TELEPHONE ENCOUNTER
----- Message from Raphael Stubbs sent at 3/3/2021  8:16 AM EST -----  Regarding: Pre-Procedure Testing Question  Contact: [phone]  Hi,    I think you were made aware that my surgery is Friday  They told me that I needed to meet with you prior to that  I would need those shots and i also need to know the insulin dosage  Is there not an issue that I'm on baby aspirin to prevent preeclampsia?

## 2021-03-03 NOTE — TELEPHONE ENCOUNTER
Patient called to inform us that she spoke to her OB , surgery cannot be done at the [address] due to there not being an OB present there  Patient was told she could have surgery at the [facility] or [facility]  Please advise, as her surgery is a week from today      C/b # [phone]
Patient is having surgery with Dr Annamarie Peña on Friday 3/5/21 in [facility] 
0

## 2021-03-03 NOTE — PROGRESS NOTES
Reviewed plan for insulin adjustments with [facility]; plan made together is to increase nighttime long-acting insulin to 34 units per increasing insulin needs owing to advancing gestation and GDMA2, however, in anticipation of her surgery on Friday afternoon, for her to take 20 units (half of an increased dose) Thursday evening given betamethasone administration  This is in addionto    I also added Lovenox 40 units SQ to begin tomorrow morning and continue until fully ambulatory  I instructed the patient to administer first dose tomorrow morning, and to not give herself a dose on Friday due to surgery  She will be instructed by her orthopedic team on when to resume  She was instructed to take today's dose of low-dose aspirin and to wait instruction from her orthopedic team on aspirin  If complexity of care plan (four times daily insulin plus new Lovenox) exceeds patient's capacity to administer at home, advise consideration of admission to antepartum tomorrow evening (night before surgery)  Will message inpatient M doctor and Dr Mary Jo Elizabeth      Future Appointments   Date Time Provider Orlando Barry   3/4/2021  1:45  [facility] [address] BE [facility] 22   3/4/2021  2:30 PM  [facility]   3/4/2021  3:15 PM  NURSE [facility] 22   3/5/2021  3:40 PM Melisa Garcia MD [facility] Practice-Wom   3/9/2021  3:15 PM  NURSE [address] [address]   3/12/2021  2:30 PM [address] [facility]   3/15/2021  4:00 PM MD MAIKOL Forbes [facility] Practice-Wom   3/23/2021  3:45 PM Miguel Funez MD [facility] Practice-Ort   3/31/2021  3:00 PM MD MAIKOL Forbes [facility] Practice-Wom   2021  3:40 PM MD MAIKOL Cabral [facility] Practice-Wom   2021  3:40 PM MD MAIKOL Cabral [facility] Practice-Wom   2021  3:40 PM Melisa Garcia MD [facility] Ken Salazar MD

## 2021-03-03 NOTE — LETTER
March 3, 2021     [place], [address] [address]    Patient: Ni Currie   YOB: 1975   Date of Visit: 3/3/2021       Dear Dr Jack Cohen: Thank you for referring Jeremías Fernando to me for evaluation  Below are my notes for this consultation  If you have questions, please do not hesitate to call me  I look forward to following your patient along with you  Sincerely,        Jt Portillo MD        CC: MD Magdalena Wilson MD Ricke Eng, MD Eustaquio Ros, MD  3/3/2021  4:41 PM  Sign when Signing Visit  Reviewed plan for insulin adjustments with [facility]; plan made together is to increase nighttime long-acting insulin to 34 units per increasing insulin needs owing to advancing gestation and GDMA2, however, in anticipation of her surgery on Friday afternoon, for her to take 20 units (half of an increased dose) Thursday evening given betamethasone administration  This is in addionto    I also added Lovenox 40 units SQ to begin tomorrow morning and continue until fully ambulatory  I instructed the patient to administer first dose tomorrow morning, and to not give herself a dose on Friday due to surgery  She will be instructed by her orthopedic team on when to resume  She was instructed to take today's dose of low-dose aspirin and to wait instruction from her orthopedic team on aspirin  If complexity of care plan (four times daily insulin plus new Lovenox) exceeds patient's capacity to administer at home, advise consideration of admission to antepartum tomorrow evening (night before surgery)  Will message inpatient M doctor and Dr Jack Cohen      Future Appointments   Date Time Provider Orlando Barry   3/4/2021  1:45 PM [facility]   3/4/2021  2:30 PM  [address]   3/4/2021  3:15 PM  NURSE SONIDO HERNANDEZ [address]   3/5/2021  3:40 PM MD MAIKOL Grover [facility] Practice-Wom   3/9/2021  3:15  [address] [facility]   3/12/2021  2:30 PM [address] [facility]   3/15/2021  4:00 PM MD MAIKOL Diallo [facility] Practice-Wom   3/23/2021  3:45 PM James Rosenthal MD [facility] Practice-Ort   3/31/2021  3:00 PM MD MAIKOL Diallo [facility] Practice-Wom   4/6/2021  3:40 PM MD MAIKOL Grover [facility] Practice-Wom   4/13/2021  3:40 PM MD MAIKOL Grover [facility] Practice-W[facility]   4/27/2021  3:40 PM MD Rocco Grover MD

## 2021-03-03 NOTE — TELEPHONE ENCOUNTER
Attempted to reach patient by phone and left voicemail to confirm appointment for MFM ultrasound  1 support person ( must be over the age of 15) may accompany you for your appointment  If you or your support person have traveled outside the state in the past 2 weeks, please call and notify our office today #[phone]  You and your support person must wear a mask ,covering nose and mouth,during your entire visit  To minimize your exposure in our waiting room, please call our office prior to entering the building  Check in and rooming questions will be done via phone  We will give you directions when to enter for your appointment  [employer]: [phone]    IF you are not feeling well- cough, fever, shortness of breath or any flu like symptoms, contact your primary care physician or [phone]  If you are awaiting COVID 19 test results please call and reschedule your appointment    Any questions with these instructions please call Maternal Fetal Medicine nurse line today @ # [phone]

## 2021-03-03 NOTE — PROGRESS NOTES
Assessment and Plan:    Problem List Items Addressed This Visit        Cardiovascular and Mediastinum    Atrial fibrillation [facility])     Continue with yearly cardiology follow-up  Migraine syndrome    Transient ischemic attack       Other    Erectile dysfunction of non-organic origin    Hyperglycemia - Primary     Very stable fasting sugar of 92 with hemoglobin A1c of 5 8  Check 1 year  Continue decreasing carbohydrate  Relevant Orders    Hemoglobin A1C    Comprehensive metabolic panel    Vitamin D deficiency     Vitamin-D level good at 42  Continue supplementation recheck 1 year  Relevant Orders    Vitamin D 25 hydroxy    Mixed hyperlipidemia     LDL stable at 90 continue over-the-counter red yeast rice recheck 1 year  Relevant Orders    Lipid Panel with Direct LDL reflex    Prostate cancer screening     PSA within normal limits recheck 1 year  Relevant Orders    PSA, Total Screen      Other Visit Diagnoses     Need for influenza vaccination        Relevant Orders    PREFERRED: influenza vaccine, [id number], quadrivalent, recombinant, PF, 0 5 mL, for patients 18 yr+ ([facility]) (Completed)             Diagnoses and all orders for this visit:    Hyperglycemia  -     Hemoglobin A1C; Future  -     Comprehensive metabolic panel; Future    Transient ischemic attack    Migraine syndrome    Atrial fibrillation, unspecified type (HCC)    Vitamin D deficiency  -     Vitamin D 25 hydroxy; Future    Mixed hyperlipidemia  -     Lipid Panel with Direct LDL reflex; Future    Erectile dysfunction of non-organic origin    Need for influenza vaccination  -     PREFERRED: influenza vaccine, [id number], quadrivalent, recombinant, PF, 0 5 mL, for patients 18 yr+ ([facility])    Prostate cancer screening  -     PSA, Total Screen; Future              Subjective:      Patient ID: Trell Major is a 58 y o  male      CC:    Chief Complaint   Patient presents with    Physical Exam       HPI:      Terri Anderson Date:  21  RE: Donita Goodell    : 1975  Estimated Date of Delivery: 21  EGA: 31w0d  OB/GYN: Destinee Nicole    Insulin controlled gestational diabetes      Via mychart     Current regimen:  Basaglar- 28 units at 9 or 10 pm daily (started 2/15)  2000 calorie GDM diet with 3 meals & 3 snacks  Patient was advised to aim for 45 gm CHO at all 3 meals and increase protein to 4-5 oz  Patient reported protein sources are typically shell fish, and some cheese,nuts  Patient dislikes meat  Per phone discussion patient was not always including protein in meals  Self-Blood Glucose Monitoring 4 times daily with an Accu-Chek Gudie glucose meter   Patient is unable to walk due to sciatica pain  Plan: Spoke with JANES Caicedo and Dr Madalyn Townsend regarding plan due to upcoming ankle surgery on Friday  Patient also received Betamethasone shot today at [facility]  Plan was discussed at [facility] appointment and a [employer] message was sent to patient from JANES Caicedo:    -basaglar 34 units tonight 3/3/21; tomorrow night 3/4/21 (day before surgery) inject 20 units at bedtime ; post surgery return to 34 units daily unless dose in changed during hospital stay  -Due to 2 hours post meal glucose readings above 120, start Humalog or Novolog 4 units before each meal, hold when not eating, please add red ribbon to not confuse with long acting insulin     -Continue GDM diet, testing and reporting via flowsheet   -Always have glucose available to treat hypoglycemia, use 15 by 15 rule  -Continue close contact with diabetes education team    -Send Spontly on Monday, 3/8/21 to have glucose readings reviewed    Let me know if any issues with pharmacy  Hypoglycemia s/s and treatment were reviewed today  Spoke with pharmacy regarding prescriptions  Pharmacist stated Humalog is covered under patient's plan  21 HbA1c 5 4% down from 20 A1c 5 7%   Noted CMP results    Mariah Cochran  Diabetes Educator   Diabetes & Pregnancy Michelle Dumont is here for chronic conditions f/u including the diagnosis of Hyperglycemia  (primary encounter diagnosis)  Transient ischemic attack  Migraine syndrome  Atrial fibrillation, unspecified type (hcc)  Vitamin d deficiency  Mixed hyperlipidemia  Erectile dysfunction of non-organic origin  Need for influenza vaccination   Pt  states they are taking all medications as directed without complaints or side effects   Pt  had labs done prior to today's visit which included Recent Results (from the past 672 hour(s))  -Hemoglobin A1C  Collection Time: 02/25/19  7:14 AM       Result                      Value             Ref Range           Hemoglobin A1C              5 8               4 2 - 6 3 %         EAG                         120               mg/dl          -Vitamin D 25 hydroxy  Collection Time: 02/25/19  7:14 AM       Result                      Value             Ref Range           Vit D, 25-Hydroxy           42 4              30 0 - 100 0*  -PSA, Total Screen  Collection Time: 02/25/19  7:14 AM       Result                      Value             Ref Range           PSA                         0 7               0 0 - 4 0 ng*  -Lipid Panel with Direct LDL reflex  Collection Time: 02/25/19  7:14 AM       Result                      Value             Ref Range           Cholesterol                 159               50 - 200 mg/*       Triglycerides               100               <=150 mg/dL         HDL, Direct                 49                40 - 60 mg/dL       LDL Calculated              90                0 - 100 mg/dL  -Comprehensive metabolic panel  Collection Time: 02/25/19  7:14 AM       Result                      Value             Ref Range           Sodium                      136               136 - 145 mm*       Potassium                   4 1               3 5 - 5 3 mm*       Chloride                    102               100 - 108 mm*       CO2                         30                21 - 32 Program mmol*       ANION GAP                   4                 4 - 13 mmol/L       BUN                         27 (H)            5 - 25 mg/dL        Creatinine                  1 21              0 60 - 1 30 *       Glucose, Fasting            92                65 - 99 mg/dL       Calcium                     8 2 (L)           8 3 - 10 1 m*       AST                         21                5 - 45 U/L          ALT                         26                12 - 78 U/L         Alkaline Phosphatase        65                46 - 116 U/L        Total Protein               7 2               6 4 - 8 2 g/*       Albumin                     3 3 (L)           3 5 - 5 0 g/*       Total Bilirubin             1 05 (H)          0 20 - 1 00 *       eGFR                        64                ml/min/1 73s*    Pt is concerned about his weight and would like to lose  He did just come home from a cruise  The following portions of the patient's history were reviewed and updated as appropriate: allergies, current medications, past family history, past medical history, past social history, past surgical history and problem list       Review of Systems   Constitutional: Positive for unexpected weight change  HENT: Negative  Eyes: Negative  Respiratory: Negative  Cardiovascular: Negative  Gastrointestinal: Negative  Endocrine: Negative  Genitourinary: Negative  Musculoskeletal: Negative  Skin: Negative  Allergic/Immunologic: Negative  Neurological: Negative  Hematological: Negative  Psychiatric/Behavioral: Negative  Data to review:       Objective:    Vitals:    02/27/19 0831   BP: 118/60   BP Location: Left arm   Patient Position: Sitting   Pulse: 72   Weight: (!) 137 kg (302 lb 12 8 oz)   Height: 5' 11" (1 803 m)        Physical Exam   Constitutional: He is oriented to person, place, and time  He appears well-developed and well-nourished  No distress     HENT:   Head: Normocephalic and atraumatic  Eyes: Conjunctivae are normal  Right eye exhibits no discharge  Left eye exhibits no discharge  Neck: Carotid bruit is not present  Cardiovascular: Normal rate, regular rhythm and normal heart sounds  Exam reveals no gallop and no friction rub  No murmur heard  Pulmonary/Chest: Effort normal and breath sounds normal  No respiratory distress  He has no wheezes  He has no rales  Neurological: He is alert and oriented to person, place, and time  Skin: Skin is warm and dry  He is not diaphoretic  Psychiatric: He has a normal mood and affect  Judgment normal    Nursing note and vitals reviewed  BMI Counseling: Body mass index is 42 23 kg/m²  Discussed the patient's BMI with him  The BMI is above average  BMI counseling and education was provided to the patient  Nutrition recommendations include reducing portion sizes, decreasing overall calorie intake, 3-5 servings of fruits/vegetables daily, reducing fast food intake, consuming healthier snacks, decreasing soda and/or juice intake, moderation in carbohydrate intake, increasing intake of lean protein, reducing intake of saturated fat and trans fat and reducing intake of cholesterol

## 2021-03-03 NOTE — TELEPHONE ENCOUNTER
Patient  came in office stating wife is still waiting for wheel chair and needs it ASAP per [facility] height and weight required and doctors signature needed  Please follow up   TY

## 2021-03-03 NOTE — TELEPHONE ENCOUNTER
Can we have Adam Dwyer come into an office today and tomorrow for BTM dosing? With BTM dosing there is sometimes an increase in blood sugars she should continue to monitor this - the procedure is technically outpatient but I would like to keep her overnight Friday for blood sugar monitoring and fetal monitoring (postop)    For aspirin dosing - I would defer to ortho - I am not sure if their protocol is to stop it or not before this type of procedure  If she needs to stop it for 2-3 days this will not affect the baby

## 2021-03-03 NOTE — PATIENT INSTRUCTIONS
You can take today's aspirin dose  Your orthopedic surgeon will let you know about taking Thursday's and Friday's dose

## 2021-03-04 ENCOUNTER — TELEPHONE (OUTPATIENT)
Dept: OBGYN CLINIC | Facility: HOSPITAL | Age: 46
End: 2021-03-04

## 2021-03-04 ENCOUNTER — ROUTINE PRENATAL (OUTPATIENT)
Dept: PERINATAL CARE | Facility: OTHER | Age: 46
End: 2021-03-04

## 2021-03-04 ENCOUNTER — ULTRASOUND (OUTPATIENT)
Dept: PERINATAL CARE | Facility: OTHER | Age: 46
End: 2021-03-04

## 2021-03-04 ENCOUNTER — CLINICAL SUPPORT (OUTPATIENT)
Dept: PERINATAL CARE | Facility: OTHER | Age: 46
End: 2021-03-04
Payer: COMMERCIAL

## 2021-03-04 VITALS
BODY MASS INDEX: 33.65 KG/M2 | SYSTOLIC BLOOD PRESSURE: 112 MMHG | HEIGHT: 62 IN | DIASTOLIC BLOOD PRESSURE: 71 MMHG | HEART RATE: 90 BPM

## 2021-03-04 DIAGNOSIS — O09.819 PREGNANCY RESULTING FROM IN VITRO FERTILIZATION, ANTEPARTUM: ICD-10-CM

## 2021-03-04 DIAGNOSIS — O24.414 INSULIN CONTROLLED WHITE CLASSIFICATION A2 GESTATIONAL DIABETES MELLITUS (GDM): ICD-10-CM

## 2021-03-04 DIAGNOSIS — Z36.89 ENCOUNTER FOR ULTRASOUND TO ASSESS FETAL GROWTH: Primary | ICD-10-CM

## 2021-03-04 DIAGNOSIS — Z3A.31 31 WEEKS GESTATION OF PREGNANCY: Primary | ICD-10-CM

## 2021-03-04 DIAGNOSIS — S82.852A CLOSED DISPLACED TRIMALLEOLAR FRACTURE OF LEFT ANKLE, INITIAL ENCOUNTER: Primary | ICD-10-CM

## 2021-03-04 DIAGNOSIS — Z3A.31 31 WEEKS GESTATION OF PREGNANCY: ICD-10-CM

## 2021-03-04 DIAGNOSIS — Z36.89 ENCOUNTER FOR FETAL ANATOMIC SURVEY: ICD-10-CM

## 2021-03-04 DIAGNOSIS — IMO0002 EVALUATE ANATOMY NOT SEEN ON PRIOR SONOGRAM: ICD-10-CM

## 2021-03-04 PROCEDURE — 59025 FETAL NON-STRESS TEST: CPT | Performed by: OBSTETRICS & GYNECOLOGY

## 2021-03-04 PROCEDURE — NC001 PR NO CHARGE

## 2021-03-04 PROCEDURE — 96372 THER/PROPH/DIAG INJ SC/IM: CPT

## 2021-03-04 PROCEDURE — 76816 OB US FOLLOW-UP PER FETUS: CPT | Performed by: OBSTETRICS & GYNECOLOGY

## 2021-03-04 PROCEDURE — 99214 OFFICE O/P EST MOD 30 MIN: CPT | Performed by: OBSTETRICS & GYNECOLOGY

## 2021-03-04 RX ADMIN — BETAMETHASONE SODIUM PHOSPHATE AND BETAMETHASONE ACETATE 12 MG: 3; 3 INJECTION, SUSPENSION INTRA-ARTICULAR; INTRALESIONAL; INTRAMUSCULAR at 14:35

## 2021-03-04 NOTE — ANESTHESIA PREPROCEDURE EVALUATION
Procedure:  OPEN REDUCTION W/ INTERNAL FIXATION (ORIF) ANKLE (Left Ankle)    Relevant Problems   GYN   (+) 31 weeks gestation of pregnancy   (+) Advanced maternal age in multigravida, unspecified trimester      NEURO/PSYCH   (+) Anxiety associated with depression      Other   (+) Insulin controlled White classification A2 gestational diabetes mellitus (GDM)      LOVENOX AT 0900 YESTERDAY  Physical Exam    Airway    Mallampati score: II  TM Distance: >3 FB  Neck ROM: full     Dental   No notable dental hx     Cardiovascular      Pulmonary      Other Findings        Anesthesia Plan  ASA Score- 3     Anesthesia Type- regional and spinal with ASA Monitors  Additional Monitors:   Airway Plan:           Plan Factors-Exercise tolerance (METS): >4 METS  Chart reviewed  Patient is not a current smoker  Patient not instructed to abstain from smoking on day of procedure  Patient did not smoke on day of surgery  Induction- intravenous  Postoperative Plan-     Informed Consent- Anesthetic plan and risks discussed with patient  I personally reviewed this patient with the CRNA  Discussed and agreed on the Anesthesia Plan with the CRNA             Lab Results   Component Value Date    GLUC 76 02/24/2021    GLUF 76 02/24/2021    ALT 19 02/24/2021    AST 20 02/24/2021    BUN 12 02/24/2021    CALCIUM 8 8 02/24/2021     02/24/2021    CO2 25 02/24/2021    CREATININE 0 54 (L) 02/24/2021    HDL 61 (H) 06/21/2019    HCT 32 2 (L) 01/23/2021    HGB 10 0 (L) 01/23/2021    HGBA1C 5 4 02/24/2021     01/23/2021    K 3 9 02/24/2021    TRIG 68 06/21/2019    WBC 9 23 01/23/2021       Spinal technique discussed with Patient   Discussed side effects including post dural puncture headache with patient  All questions answered  Consent given  Discussed with Patient the procedure for Popliteal and adductor blocks  , side effects including extended numbness of the extremity and potential for an incomplete Block   All questions answered  Consent given

## 2021-03-04 NOTE — LETTER
March 4, 2021     [place], [address] [address]    Patient: Ni Currie   YOB: 1975   Date of Visit: 3/4/2021       Dear Dr Jack Cohen: Thank you for referring Jeremías Fernando to me for evaluation  Below are my notes for this consultation  If you have questions, please do not hesitate to call me  I look forward to following your patient along with you  Sincerely,        Ashley Mohr RN        CC: No Recipients  Heidi Strickland MD  3/4/2021  5:44 PM  Sign when Signing Visit  [address]: Ms Aleksandar Green was seen today at 31w1d for NST (found under the pregnancy episode) which I reviewed the RN assessment and agree, and fetal growth ultrasound (see ultrasound report under OB procedures tab)  See ultrasound report under "OB Procedures" tab    Please don't hesitate to contact our office with any concerns or questions   -Heidi Strickland MD

## 2021-03-04 NOTE — PROGRESS NOTES
[address]: Ms Rena Stinson was seen today at 31w1d for NST (found under the pregnancy episode) which I reviewed the RN assessment and agree, and fetal growth ultrasound (see ultrasound report under OB procedures tab)  See ultrasound report under "OB Procedures" tab    Please don't hesitate to contact our office with any concerns or questions   -Cuco Andrea MD

## 2021-03-04 NOTE — TELEPHONE ENCOUNTER
Marcela Castanon is calling from [facility] in reference to the wheel chair order  [facility] needs the clinical documentation including office notes stating why the wheel chair is required       Please fax to [phone]    Marcela Castanon [phone]

## 2021-03-04 NOTE — TELEPHONE ENCOUNTER
Yair Morse [birth year]  Pt called about her blood sugar  It is 237  She got an steroid injection today  She is just concerned about how high it is   [phone]    Paged Dr Dipak Lawson via [facility] [place]

## 2021-03-04 NOTE — LETTER
Gayle Ramos  MRN: [id number] MRN: [id number] MRN: [id number]  : [birth year]  RUDDY/GA: RUDDY/GA: RUDDY/GA:  Date:  Date:  Date:     Gayle Ramos  MRN: [id number] MRN: [id number] MRN: [id number]  : [birth year]  RUDDY/GA: RUDDY/GA: RUDDY/GA:  Date:  Date:  Date:     Gayle Ramos  MRN: [id number] MRN: [id number] MRN: [id number]  : [birth year]  RUDDY/GA: RUDDY/GA: RUDDY/GA:  Date:  Date:  Date:     Gayle Ramos  MRN: [id number] MRN: [id number] MRN: [id number]  : [birth year]  RUDDY/GA: RUDDY/GA: RUDDY/GA:  Date:  Date:  Date:     Gayle Ramos  MRN: [id number] MRN: [id number] MRN: [id number]  : [birth year]  RUDDY/GA: RUDDY/GA: RUDDY/GA:  Date:  Date:  Date:     Gayle Ramos  MRN: [id number] MRN: [id number] MRN: [id number]  : [birth year]  RUDDY/GA: RUDDY/GA: RUDDY/GA:  Date:  Date:  Date:     Gayle Ramos  MRN: [id number] MRN: [id number] MRN: [id number]  : [birth year]  RUDDY/GA: RUDDY/GA: RUDDY/GA:  Date:  Date:  Date:

## 2021-03-04 NOTE — PATIENT INSTRUCTIONS
NST procedure and expected outcome explained to patient  Daily fetal kick count discussed with handout given  Patient verbalized understanding of all and was receptive  Tawny Jackson RNNonstcarmelo Test for Pregnancy   WHAT YOU NEED TO KNOW:   What do I need to know about a nonstress test?  A nonstress test measures your baby's heart rate and movements  Nonstress means that no stress will be placed on your baby during the test    How do I prepare for a nonstress test?  Your healthcare provider will talk to you about how to prepare for this test  He may tell you to eat and drink plenty of fluids before your test  If you smoke, you may be asked not to smoke within 2 hours before the test  He will also tell you what medicines to take or not take on the day of your test    What will happen during a nonstress test?  You may be asked to lie down or recline back for the test on a bed  One or two belts with sensors will be placed around your abdomen  Your baby's heart rate will be recorded with a machine  If your baby does not move, your baby may be asleep  Your healthcare provider may make a noise near your abdomen to try to wake your baby  The test usually takes about 20 minutes, but can take longer if your baby needs to be awakened  What do I need to know about the test results? Your baby will be expected to move at least twice for a certain amount of time  Your baby's heart rate will be expected to go up by a certain number of beats per minute during movement  If your baby does not move as expected, the test may need to be repeated or you may need other tests  CARE AGREEMENT:   You have the right to help plan your care  Learn about your health condition and how it may be treated  Discuss treatment options with your healthcare providers to decide what care you want to receive  You always have the right to refuse treatment  The above information is an  only   It is not intended as medical advice for individual conditions or treatments  Talk to your doctor, nurse or pharmacist before following any medical regimen to see if it is safe and effective for you  © Copyright [address] Information is for End User's use only and may not be sold, redistributed or otherwise used for commercial purposes  All illustrations and images included in CareNotes® are the copyrighted property of A [employer]  or [employer] [facility] Didier Anderson in Pregnancy   AMBULATORY CARE:   Kick counts  measure how much your baby is moving in your womb  A kick from your baby can be felt as a twist, turn, swish, roll, or jab  It is common to feel your baby kicking at 26 to 28 weeks of pregnancy  You may feel your baby kick as early as 20 weeks of pregnancy  You may want to start counting at 28 weeks  Contact your healthcare provider immediately if:   · You feel a change in the number of kicks or movements of your baby  · You feel fewer than 10 kicks within 2 hours  · You have questions or concerns about your baby's movements  Why measure kick counts:  Your baby's movement may provide information about your baby's health  He or she may move less, or not at all, if there are problems  Your baby may move less if he or she is not getting enough oxygen or nutrition from the placenta  Do not smoke while you are pregnant  Smoking decreases the amount of oxygen that gets to your baby  Talk to your healthcare provider if you need help to quit smoking  Tell your healthcare provider as soon as you feel a change in your baby's movements  When to measure kick counts:   · Measure kick counts at the same time every day  · Measure kick counts when your baby is awake and most active  Your baby may be most active in the evening  How to measure kick counts:  Check that your baby is awake before you measure kick counts  You can wake up your baby by lightly pushing on your belly, walking, or drinking something cold   Your healthcare provider may tell you different ways to measure kick counts  You may be told to do the following:  · Use a chart or clock to keep track of the time you start and finish counting  · Sit in a chair or lie on your left side  · Place your hands on the largest part of your belly  · Count until you reach 10 kicks  Write down how much time it takes to count 10 kicks  · It may take 30 minutes to 2 hours to count 10 kicks  It should not take more than 2 hours to count 10 kicks  Follow up with your healthcare provider as directed:  Write down your questions so you remember to ask them during your visits  © Copyright [address] [place] [address] Drive Information is for End User's use only and may not be sold, redistributed or otherwise used for commercial purposes  All illustrations and images included in CareNotes® are the copyrighted property of A [employer]  or [facility] [address]   The above information is an  only  It is not intended as medical advice for individual conditions or treatments  Talk to your doctor, nurse or pharmacist before following any medical regimen to see if it is safe and effective for you

## 2021-03-04 NOTE — PROGRESS NOTES
NST procedure and expected outcome explained to patient  Daily fetal kick count discussed with handout given  Patient verbalized understanding of all and was receptive  Second dose of steroid given    Abril Kothari RN

## 2021-03-04 NOTE — LETTER
NST sleeve cover sheet    Patient name: Tena Pruitt  : 1975  MRN: [id number]    RUDDY: Estimated Date of Delivery: 21    Obstetrician: _________________sloga______________    Reason(s) for testing:                                                       _A2GDM                                                        AMA_______________      Testing frequency:    __x_ 2x/wk  ___ 1x/wk  ___ Dopplers  ___ BPP?       Last growth scan: __________________________________________

## 2021-03-04 NOTE — PRE-PROCEDURE INSTRUCTIONS
Pre-Surgery Instructions:   Medication Instructions    aspirin (ECOTRIN LOW STRENGTH) 81 mg EC tablet Instructed by OB to hold ASA on 3/3 pm and further instructions from Dr Matthew Jimenez 100 units/mL injection pen Instructed by OB to take 20 units night before surgery    betamethasone acetate-betamethasone sodium phosphate (CELESTONE) 6 (3-3) mg/mL Per OB instructions will receive second dose today [email or web address]    buPROPion (WELLBUTRIN XL) 150 mg 24 hr tablet Instructed to take per normal schedule    buPROPion (WELLBUTRIN XL) 300 mg 24 hr tablet Instructed to take per normal schedule    citalopram (CeleXA) 40 mg tablet Instructed to take per normal schedule    enoxaparin (LOVENOX) 40 mg/0 4 mL Per OB instructions will have dose 3/4/21@ 0900 and hold DOS    Ferrous Sulfate (IRON PO) Instructed to take per normal schedule    insulin lispro (HumaLOG KwikPen) 100 units/mL injection pen Instructed to take 4 units before dinner night before surgery    Prenatal MV-Min-Fe Fum-FA-DHA (PRENATAL 1 PO) Instructed to take per normal schedule    VITAMIN D PO Instructed to take per normal schedule    Pre op instructions per My Surgical Experience booklet,medications per anesthesia guidelines and showering instructions per [employer] protocol reviewed-Patient has CHG  Pt  Verbalized understanding of current visitor restrictions and will clarify with ASC nurses DOS  Instructed to avoid all ASA/NSAIDs and OTC Vit/Supp from now until after surgery  Tylenol ok prn  Pt  Verbalized an understanding of all instructions reviewed and offers no concerns at this time

## 2021-03-05 ENCOUNTER — APPOINTMENT (OUTPATIENT)
Dept: RADIOLOGY | Facility: HOSPITAL | Age: 46
End: 2021-03-05
Payer: COMMERCIAL

## 2021-03-05 ENCOUNTER — HOSPITAL ENCOUNTER (OUTPATIENT)
Facility: HOSPITAL | Age: 46
Setting detail: OUTPATIENT SURGERY
Discharge: HOME | End: 2021-03-06
Attending: ORTHOPAEDIC SURGERY | Admitting: ORTHOPAEDIC SURGERY
Payer: COMMERCIAL

## 2021-03-05 ENCOUNTER — ANESTHESIA (OUTPATIENT)
Dept: PERIOP | Facility: HOSPITAL | Age: 46
End: 2021-03-05
Payer: COMMERCIAL

## 2021-03-05 DIAGNOSIS — S82.852A CLOSED DISPLACED TRIMALLEOLAR FRACTURE OF LEFT ANKLE, INITIAL ENCOUNTER: Primary | ICD-10-CM

## 2021-03-05 DIAGNOSIS — S92.902A CLOSED FRACTURE OF LEFT FOOT, INITIAL ENCOUNTER: ICD-10-CM

## 2021-03-05 LAB
GLUCOSE SERPL-MCNC: 160 MG/DL (ref 65–140)
GLUCOSE SERPL-MCNC: 73 MG/DL (ref 65–140)
GLUCOSE SERPL-MCNC: 89 MG/DL (ref 65–140)

## 2021-03-05 PROCEDURE — C1713 ANCHOR/SCREW BN/BN,TIS/BN: HCPCS | Performed by: ORTHOPAEDIC SURGERY

## 2021-03-05 PROCEDURE — 27823 TREATMENT OF ANKLE FRACTURE: CPT | Performed by: ORTHOPAEDIC SURGERY

## 2021-03-05 PROCEDURE — 77071 MNL APPL STRS JT RADIOGRAPHY: CPT | Performed by: ORTHOPAEDIC SURGERY

## 2021-03-05 PROCEDURE — 73600 X-RAY EXAM OF ANKLE: CPT

## 2021-03-05 PROCEDURE — 99024 POSTOP FOLLOW-UP VISIT: CPT | Performed by: ORTHOPAEDIC SURGERY

## 2021-03-05 PROCEDURE — 82948 REAGENT STRIP/BLOOD GLUCOSE: CPT

## 2021-03-05 PROCEDURE — 27823 TREATMENT OF ANKLE FRACTURE: CPT | Performed by: PHYSICIAN ASSISTANT

## 2021-03-05 DEVICE — BONE SCREW
Type: IMPLANTABLE DEVICE | Site: ANKLE | Status: FUNCTIONAL
Brand: VARIAX

## 2021-03-05 DEVICE — LOCKING SCREW
Type: IMPLANTABLE DEVICE | Site: ANKLE | Status: FUNCTIONAL
Brand: VARIAX

## 2021-03-05 DEVICE — CANNULATED SCREW
Type: IMPLANTABLE DEVICE | Site: ANKLE | Status: FUNCTIONAL
Brand: ASNIS

## 2021-03-05 DEVICE — ONE-THIRD TUBULAR PLATE: Type: IMPLANTABLE DEVICE | Site: ANKLE | Status: FUNCTIONAL

## 2021-03-05 DEVICE — BROAD Y-PLATE
Type: IMPLANTABLE DEVICE | Site: ANKLE | Status: FUNCTIONAL
Brand: VARIAX

## 2021-03-05 RX ORDER — FENTANYL CITRATE/PF 50 MCG/ML
50 SYRINGE (ML) INJECTION
Status: DISCONTINUED | OUTPATIENT
Start: 2021-03-05 | End: 2021-03-05 | Stop reason: HOSPADM

## 2021-03-05 RX ORDER — ACETAMINOPHEN 325 MG/1
650 TABLET ORAL EVERY 6 HOURS PRN
Status: DISCONTINUED | OUTPATIENT
Start: 2021-03-05 | End: 2021-03-06 | Stop reason: HOSPADM

## 2021-03-05 RX ORDER — LIDOCAINE HYDROCHLORIDE 10 MG/ML
0.5 INJECTION, SOLUTION EPIDURAL; INFILTRATION; INTRACAUDAL; PERINEURAL ONCE AS NEEDED
Status: DISCONTINUED | OUTPATIENT
Start: 2021-03-05 | End: 2021-03-06 | Stop reason: HOSPADM

## 2021-03-05 RX ORDER — CEFAZOLIN SODIUM 2 G/50ML
2000 SOLUTION INTRAVENOUS EVERY 8 HOURS
Status: DISCONTINUED | OUTPATIENT
Start: 2021-03-05 | End: 2021-03-06 | Stop reason: HOSPADM

## 2021-03-05 RX ORDER — HYDROMORPHONE HYDROCHLORIDE 2 MG/1
2 TABLET ORAL EVERY 4 HOURS PRN
Status: DISCONTINUED | OUTPATIENT
Start: 2021-03-05 | End: 2021-03-06 | Stop reason: HOSPADM

## 2021-03-05 RX ORDER — FENTANYL CITRATE 50 UG/ML
INJECTION, SOLUTION INTRAMUSCULAR; INTRAVENOUS AS NEEDED
Status: DISCONTINUED | OUTPATIENT
Start: 2021-03-05 | End: 2021-03-05

## 2021-03-05 RX ORDER — OXYCODONE HYDROCHLORIDE 5 MG/1
5 TABLET ORAL EVERY 4 HOURS PRN
Status: DISCONTINUED | OUTPATIENT
Start: 2021-03-05 | End: 2021-03-06 | Stop reason: HOSPADM

## 2021-03-05 RX ORDER — OXYCODONE HYDROCHLORIDE 5 MG/1
5 TABLET ORAL EVERY 4 HOURS PRN
Qty: 30 TABLET | Refills: 0 | Status: SHIPPED | OUTPATIENT
Start: 2021-03-05 | End: 2021-03-06 | Stop reason: SDUPTHER

## 2021-03-05 RX ORDER — CHLORHEXIDINE GLUCONATE 4 G/100ML
SOLUTION TOPICAL DAILY PRN
Status: DISCONTINUED | OUTPATIENT
Start: 2021-03-05 | End: 2021-03-06 | Stop reason: HOSPADM

## 2021-03-05 RX ORDER — OXYCODONE HYDROCHLORIDE 10 MG/1
10 TABLET ORAL EVERY 4 HOURS PRN
Status: DISCONTINUED | OUTPATIENT
Start: 2021-03-05 | End: 2021-03-06 | Stop reason: HOSPADM

## 2021-03-05 RX ORDER — SODIUM CHLORIDE, SODIUM LACTATE, POTASSIUM CHLORIDE, CALCIUM CHLORIDE 600; 310; 30; 20 MG/100ML; MG/100ML; MG/100ML; MG/100ML
125 INJECTION, SOLUTION INTRAVENOUS CONTINUOUS
Status: DISCONTINUED | OUTPATIENT
Start: 2021-03-05 | End: 2021-03-06 | Stop reason: HOSPADM

## 2021-03-05 RX ORDER — MAGNESIUM HYDROXIDE 1200 MG/15ML
LIQUID ORAL AS NEEDED
Status: DISCONTINUED | OUTPATIENT
Start: 2021-03-05 | End: 2021-03-05 | Stop reason: HOSPADM

## 2021-03-05 RX ORDER — ONDANSETRON 4 MG/1
4 TABLET, FILM COATED ORAL EVERY 8 HOURS PRN
Qty: 20 TABLET | Refills: 0 | Status: SHIPPED | OUTPATIENT
Start: 2021-03-05 | End: 2021-03-19 | Stop reason: ALTCHOICE

## 2021-03-05 RX ORDER — METOCLOPRAMIDE HYDROCHLORIDE 5 MG/ML
5 INJECTION INTRAMUSCULAR; INTRAVENOUS ONCE AS NEEDED
Status: DISCONTINUED | OUTPATIENT
Start: 2021-03-05 | End: 2021-03-05 | Stop reason: HOSPADM

## 2021-03-05 RX ORDER — BUPIVACAINE HYDROCHLORIDE 7.5 MG/ML
INJECTION, SOLUTION INTRASPINAL AS NEEDED
Status: DISCONTINUED | OUTPATIENT
Start: 2021-03-05 | End: 2021-03-05

## 2021-03-05 RX ORDER — VANCOMYCIN HYDROCHLORIDE 1 G/20ML
INJECTION, POWDER, LYOPHILIZED, FOR SOLUTION INTRAVENOUS AS NEEDED
Status: DISCONTINUED | OUTPATIENT
Start: 2021-03-05 | End: 2021-03-05 | Stop reason: HOSPADM

## 2021-03-05 RX ORDER — ROPIVACAINE HYDROCHLORIDE 5 MG/ML
INJECTION, SOLUTION EPIDURAL; INFILTRATION; PERINEURAL AS NEEDED
Status: DISCONTINUED | OUTPATIENT
Start: 2021-03-05 | End: 2021-03-05

## 2021-03-05 RX ORDER — ONDANSETRON 2 MG/ML
4 INJECTION INTRAMUSCULAR; INTRAVENOUS ONCE AS NEEDED
Status: DISCONTINUED | OUTPATIENT
Start: 2021-03-05 | End: 2021-03-05 | Stop reason: HOSPADM

## 2021-03-05 RX ORDER — PROPOFOL 10 MG/ML
INJECTION, EMULSION INTRAVENOUS CONTINUOUS PRN
Status: DISCONTINUED | OUTPATIENT
Start: 2021-03-05 | End: 2021-03-05

## 2021-03-05 RX ORDER — CITALOPRAM 20 MG/1
40 TABLET ORAL
Status: DISCONTINUED | OUTPATIENT
Start: 2021-03-05 | End: 2021-03-06 | Stop reason: HOSPADM

## 2021-03-05 RX ORDER — CEFAZOLIN SODIUM 2 G/50ML
2000 SOLUTION INTRAVENOUS ONCE
Status: DISCONTINUED | OUTPATIENT
Start: 2021-03-05 | End: 2021-03-06 | Stop reason: HOSPADM

## 2021-03-05 RX ORDER — BUPROPION HYDROCHLORIDE 150 MG/1
450 TABLET ORAL
Status: DISCONTINUED | OUTPATIENT
Start: 2021-03-05 | End: 2021-03-06 | Stop reason: HOSPADM

## 2021-03-05 RX ORDER — BUPROPION HYDROCHLORIDE 150 MG/1
300 TABLET ORAL
Status: DISCONTINUED | OUTPATIENT
Start: 2021-03-05 | End: 2021-03-05

## 2021-03-05 RX ORDER — MIDAZOLAM HYDROCHLORIDE 2 MG/2ML
INJECTION, SOLUTION INTRAMUSCULAR; INTRAVENOUS AS NEEDED
Status: DISCONTINUED | OUTPATIENT
Start: 2021-03-05 | End: 2021-03-05

## 2021-03-05 RX ORDER — ROPIVACAINE HYDROCHLORIDE 2 MG/ML
INJECTION, SOLUTION EPIDURAL; INFILTRATION; PERINEURAL AS NEEDED
Status: DISCONTINUED | OUTPATIENT
Start: 2021-03-05 | End: 2021-03-05

## 2021-03-05 RX ORDER — ONDANSETRON 2 MG/ML
INJECTION INTRAMUSCULAR; INTRAVENOUS AS NEEDED
Status: DISCONTINUED | OUTPATIENT
Start: 2021-03-05 | End: 2021-03-05

## 2021-03-05 RX ORDER — INSULIN GLARGINE 100 [IU]/ML
24 INJECTION, SOLUTION SUBCUTANEOUS EVERY EVENING
Status: DISCONTINUED | OUTPATIENT
Start: 2021-03-05 | End: 2021-03-06 | Stop reason: HOSPADM

## 2021-03-05 RX ADMIN — PROPOFOL 120 MCG/KG/MIN: 10 INJECTION, EMULSION INTRAVENOUS at 15:14

## 2021-03-05 RX ADMIN — INSULIN GLARGINE 24 UNITS: 100 INJECTION, SOLUTION SUBCUTANEOUS at 21:48

## 2021-03-05 RX ADMIN — MIDAZOLAM HYDROCHLORIDE 0.5 MG: 1 INJECTION, SOLUTION INTRAMUSCULAR; INTRAVENOUS at 14:19

## 2021-03-05 RX ADMIN — PHENYLEPHRINE HYDROCHLORIDE 50 MCG/MIN: 10 INJECTION INTRAVENOUS at 15:17

## 2021-03-05 RX ADMIN — ROPIVACAINE HYDROCHLORIDE 30 ML: 5 INJECTION, SOLUTION EPIDURAL; INFILTRATION; PERINEURAL at 14:23

## 2021-03-05 RX ADMIN — SODIUM CHLORIDE, SODIUM LACTATE, POTASSIUM CHLORIDE, AND CALCIUM CHLORIDE 125 ML/HR: .6; .31; .03; .02 INJECTION, SOLUTION INTRAVENOUS at 19:59

## 2021-03-05 RX ADMIN — SODIUM CHLORIDE, SODIUM LACTATE, POTASSIUM CHLORIDE, AND CALCIUM CHLORIDE 125 ML/HR: .6; .31; .03; .02 INJECTION, SOLUTION INTRAVENOUS at 14:09

## 2021-03-05 RX ADMIN — FENTANYL CITRATE 25 MCG: 50 INJECTION, SOLUTION INTRAMUSCULAR; INTRAVENOUS at 14:19

## 2021-03-05 RX ADMIN — BUPIVACAINE HYDROCHLORIDE IN DEXTROSE 2 ML: 7.5 INJECTION, SOLUTION SUBARACHNOID at 15:05

## 2021-03-05 RX ADMIN — BUPROPION HYDROCHLORIDE 450 MG: 150 TABLET, EXTENDED RELEASE ORAL at 21:44

## 2021-03-05 RX ADMIN — PHENYLEPHRINE HYDROCHLORIDE 200 MCG: 10 INJECTION INTRAVENOUS at 15:20

## 2021-03-05 RX ADMIN — CITALOPRAM HYDROBROMIDE 40 MG: 20 TABLET ORAL at 21:44

## 2021-03-05 RX ADMIN — ONDANSETRON 4 MG: 2 INJECTION INTRAMUSCULAR; INTRAVENOUS at 16:33

## 2021-03-05 RX ADMIN — ROPIVACAINE HYDROCHLORIDE 20 ML: 2 INJECTION, SOLUTION EPIDURAL; INFILTRATION at 14:35

## 2021-03-05 RX ADMIN — OXYCODONE HYDROCHLORIDE 10 MG: 10 TABLET ORAL at 19:58

## 2021-03-05 NOTE — OP NOTE
OPERATIVE REPORT  PATIENT NAME: Kiki Patrick    :  1975  MRN: [id number]  Pt Location: AN OR ROOM 03    SURGERY DATE: 3/5/2021    Surgeon(s) and Role:     Adilia Farley MD - Primary     * Ashley Haines PA-C - Assisting    Preop Diagnosis:  Closed displaced trimalleolar fracture of left ankle, initial encounter [V42 842A]    Post-Op Diagnosis Codes:     * Closed displaced trimalleolar fracture of left ankle, initial encounter [W64 202A]    Procedure(s) (LRB):  OPEN REDUCTION W/ INTERNAL FIXATION (ORIF) ANKLE (Left)    Specimen(s):  * No specimens in log *    Estimated Blood Loss:   Minimal    Drains:  * No LDAs found *    Anesthesia Type:   Choice    Operative Indications:  Closed displaced trimalleolar fracture of left ankle, initial encounter [E39 504I]      Operative Findings:  Consistent with diagnosis    Complications:   None    Procedure and Technique:  1  Open reduction and internal fixation of lateral malleolus ankle fracture  2  Open reduction and internal fixation of posterior malleolus fracture  3  Open reduction internal fixation of medial malleolus fracture   4  Intraoperative fluoroscopic interpretation, greater than one hour, no radiologist   5  Placement into a short leg, nonweightbearing, plaster ankle splint      OPERATIVE REPORT:    After informed consent and preoperative medical clearance were obtained, the patient was taken to the preoperative holding area  Allergies were properly assessed and patient was given appropriate perioperative IV antibiotics without complication  Please see the anesthesia report for details of the anesthesia administered  Patient was taken the operating room placed right side down left lateral decubitus position on the operating room table  All bony prominences and skin were well padded, airway maintained, genitalia protected and brachial plexi/ulnar nerves at the elbows protected   Patient's operative lower extremity was prepped and draped in sterile fashion  The operative lower extremity was exsanguinated with esmarch elastic bandage and a thigh tourniquet was utilized  A time-out was performed with the attending surgeon in the room  A longitudinal incision was made posteriorly over the postero lateral ankle, between peroneal tendons and Achilles  Careful soft tissue dissection was performed  The sural nerve branch was protected throughout the procedure  FHL tendon protected; deep NVB protected  Peroneal tendons protected  With minimal periosteal stripping, the fibular fracture fracture and the posterior malleolar fracture were exposed  The hematoma was evacuated  The fractures were reduced into an anatomic position  Temporary reduction was maintained with a clamp and intraoperative fluoroscopy used in multiple planes confirmed appropriate reduction and length of the fibula  Under fluoroscopic guidance, lag screws (as listed below under IMPLANTS) were placed across the fracture site with satisfactory purchase  A fibular plate (as listed below under IMPLANTS) was then placed and secured after fluoroscopy confirmed appropriate position the plate  Screws had satisfactory purchase distally and proximally  Intraoperative fluoroscopy confirmed appropriate alignment and length of the fibula with appropriate hardware position  With the Neurovascular structures protected, posterior malleolus fracture reduced and then secured with a posterior plate placed under fluoro guidance  Screws did not violate joint space  We then made a Medial longitudinal incision  Saphenous nerve protected, we identified the medial malleolus fracture  We cleaned out the periosteum from the fracture site and then clamped the fracture in an anatomic position  We then drove 2, 4-0 cannulated screws to fixate the fracture and confirmed an anatomic reduction on Xray        Dorsiflexion and external rotation stress testing demonstrated no medial clear space widening and and no instability to the syndesmosis  Thorough irrigation was performed using copious amounts of sterile saline  Vancomycin powder was used in both wound beds  The tourniquet was released and meticulous hemostasis was obtained  The wound was closed in layers with Vicryl suture for the deeper layers and nylon suture for the skin for a tension-less closure  There was no blanching at the skin margins after closure  Sterile dressings were applied with the ankle in neutral position and over-abundant padding with a posterior/sugar tong splint was applied  Satisfactory capillary refill remained in all toes  Patient tolerated the procedure well  There were no complications  He was taken to the recovery room in stable condition  DISPOSITION:   1  Patient is stable to PACU  2  Non-weightbearing to lower extremity for 6 weeks  3  Pain control  4  DVT prophylaxis with lovenox 40mg Daily x30D  5  Follow-up at 3 weeks with suture removal if appropriate and short leg cast at neutral position and advance to touchdown-weightbearing at that time         I was present for the entire procedure, A qualified resident physician was not available and A physician assistant was required during the procedure for retraction tissue handling,dissection and suturing    Patient Disposition:  PACU     SIGNATURE: Leonie Anaya MD  DATE: March 5, 2021  TIME: 3:05 PM

## 2021-03-05 NOTE — PROGRESS NOTES
MELIZA Joseph  Attending, Orthopaedic Surgery  Foot and Ankle  [facility]      ORTHOPAEDIC POST-OP CHECK       Procedure:     ORIF left trimalleolar ankle fracture       Date of surgery:   3/5/21      PLAN  1  Weightbearing Status- WBAT operative extremity  2  DVT prophylaxis- Lovenox 40mg Subcu Daily  3  Perioperative Ancef  4  Pain control- Tylenol, Oxy, Morphine PRN  5  PT and OT evaluations  6  Case management consult  7  OB to check fetal heart tones postop  8  Will admit patient for overnight observation  History of Present Illness:   Chief Complaint: s/p above procedure  Lexus Vegas is a 39 y o  female who is being seen post-operatively s/p the above procedure  Pain is well controlled and patient has no complaints  Review of Systems:  General- denies fever/chills  Respiratory- denies cough or SOB  Cardio- denies chest pain or palpitations  GI- denies abdominal pain  Musculoskeletal- Negative except noted above  Skin- denies rashes or wounds    Physical Exam:   /59   Pulse 86   Temp (!) 97 3 °F (36 3 °C)   Resp 18   Ht 5' 2 5" (1 588 m)   Wt 83 5 kg (184 lb)   LMP 07/28/2020 (Exact Date)   SpO2 97%   BMI 33 12 kg/m²   General/Constitutional: No apparent distress: well-nourished and well developed  Eyes: normal ocular motion  Lymphatic: No appreciable lymphadenopathy  Respiratory: Non-labored breathing  Vascular: No edema, swelling or tenderness, except as noted in detailed exam   Integumentary: No impressive skin lesions present, except as noted in detailed exam   Neuro: No ataxia or tremors noted  Psych: Normal mood and affect, oriented to person, place and time  Appropriate affect  Musculoskeletal: Normal, except as noted in detailed exam and in HPI      Examination    left        Dressing Clean, dry, intact      Ecchymosis None    Swelling Mild    Sensation Intact to light touch throughout sural, saphenous, superficial peroneal, deep peroneal and medial/lateral plantar nerve distributions  [place] 5 07 filament (10g) testing deferred  Cardiovascular Brisk capillary refill < 2 seconds,intact DP and PT pulses    Special Tests None      Imaging Studies:   Intraop views of the left ankle were taken, reviewed and interpreted independently that demonstrate hardware placement and interval fracture reduction  Reviewed by me personally  Roena Baar Lachman, MD  Foot & Ankle Surgery   Department of [facility]      I personally performed the service  Roena Baar Lachman, MD

## 2021-03-05 NOTE — ANESTHESIA PROCEDURE NOTES
Peripheral Block    Patient location during procedure: pre-op  Start time: 3/5/2021 2:20 PM  Reason for block: at surgeon's request and post-op pain management  Staffing  Anesthesiologist: Karina Worthy MD  Performed: anesthesiologist   Preanesthetic Checklist  Completed: patient identified, site marked, surgical consent, pre-op evaluation, timeout performed, IV checked, risks and benefits discussed and monitors and equipment checked  Peripheral Block  Patient position: right lateral decubitus  Prep: ChloraPrep  Patient monitoring: heart rate and continuous pulse ox  Block type: popliteal  Laterality: left  Injection technique: single-shot  Procedures: ultrasound guided, Ultrasound guidance required for the procedure to increase accuracy and safety of medication placement and decrease risk of complications  Ultrasound permanent image saved  Needle  Needle type: Stimuplex   Needle gauge: 20G    Needle length: 10 cm  Needle localization: ultrasound guidance  Needle insertion depth: 3 5 cm  Assessment  Injection assessment: incremental injection, local visualized surrounding nerve on ultrasound, negative aspiration for heme and no paresthesia on injection  Heart rate change: no  Slow fractionated injection: yes  Post-procedure:  site cleaned  patient tolerated the procedure well with no immediate complications

## 2021-03-05 NOTE — ANESTHESIA PROCEDURE NOTES
Spinal Block    Patient location during procedure: OR  Start time: 3/5/2021 3:05 PM  Reason for block: primary anesthetic  Staffing  Anesthesiologist: Ranjit Miranda MD  Resident/CRNA: Shanti Roth CRNA  Performed: resident/CRNA   Preanesthetic Checklist  Completed: patient identified, site marked, surgical consent, pre-op evaluation, timeout performed, IV checked, risks and benefits discussed and monitors and equipment checked  Spinal Block  Patient position: sitting  Prep: Betadine  Patient monitoring: heart rate, continuous pulse ox and frequent blood pressure checks  Approach: midline  Location: L3-4  Injection technique: single-shot  Needle  Needle type: pencil-tip   Needle gauge: 25 G  Needle length: 5 cm  Assessment  Sensory level: T10  Events: cerebrospinal fluid  Injection Assessment:  negative aspiration for heme, no paresthesia on injection and positive aspiration for clear CSF    Post-procedure:  site cleaned  Additional Notes  Tolerated well, no sequale

## 2021-03-05 NOTE — ANESTHESIA PROCEDURE NOTES
Peripheral Block    Patient location during procedure: holding area  Start time: 3/5/2021 2:30 PM  Reason for block: at surgeon's request and post-op pain management  Staffing  Anesthesiologist: Cara Adams MD  Performed: anesthesiologist   Preanesthetic Checklist  Completed: patient identified, site marked, surgical consent, pre-op evaluation, timeout performed, IV checked, risks and benefits discussed and monitors and equipment checked  Peripheral Block  Patient position: supine  Prep: ChloraPrep  Patient monitoring: heart rate and continuous pulse ox  Block type: adductor canal block  Laterality: left  Injection technique: single-shot  Procedures: ultrasound guided, Ultrasound guidance required for the procedure to increase accuracy and safety of medication placement and decrease risk of complications  Ultrasound permanent image saved  Needle  Needle type: Stimuplex   Needle gauge: 20G    Needle length: 10 cm  Needle localization: ultrasound guidance  Needle insertion depth: 3 cm  Assessment  Injection assessment: incremental injection, local visualized surrounding nerve on ultrasound, negative aspiration for heme and no paresthesia on injection  Paresthesia pain: none  Heart rate change: no  Slow fractionated injection: yes  Post-procedure:  site cleaned

## 2021-03-05 NOTE — INTERVAL H&P NOTE
H&P reviewed  After examining the patient I find no changes in the patients condition since the H&P had been written  There were no vitals filed for this visit  Plan for ORIF left ankle  Patient understands risk to fetus and pregnancy with this surgery  OB/Gyn on board  Patient received betamethasone for lung maturation in case of urgent delivery  Patient understands risks and wishes to proceed

## 2021-03-05 NOTE — DISCHARGE INSTRUCTIONS
MELIZA Adair  Attending, [address] [place] [address] Office Phone: [phone] ? Fax: [phone]  [address] [place] [address] Office Phone: [phone] ? BOF:[phone]    : Ever Freeman) Booker, [address] [place]     Surgery Coordinators Greg aPrker: Tyson Pretty, [address] [facility], [phone]  Surgery Coordinator Lorraine:  Hiwot Peters, [phone]                                                             Samantha Hall, [phone]  www [email or web address]   PRE-OPERATIVE AND POST-OPERATIVE INSTRUCTIONS    General Information:   Typical post operative visits are at the following intervals:  2-3 weeks post surgery, 6 weeks post surgery, 3 months post surgery, 6 months post surgery, and then on a yearly basis  However, this may change based on Dr Bailey Reza recommendation   #1 post-operative rule for foot/ankle surgery:  ONCE YOU ARE OUT OF YOUR CAST AND/OR REMOVABLE BOOT, SWELLING MAY PERSIST FOR MANY MONTHS  YOU MIGHT ALSO EXPERIENCE A BLUISH DISCOLORATION OF YOUR LEG  THIS IS NORMAL AND PART OF THE USUAL POSTOPERATIVE EXPERIENCE    DO NOT WAIT UNTIL YOUR BLOCK WEARS OFF TO TAKE YOUR PAIN MEDICATION  IT TAKES A FEW DOSES OF THE PAIN MEDICATION TO REACH A THERAPEUTIC LEVEL  TAKE A TABLET PROACTIVELY BEFORE YOU HAVE ANY PAIN AND AGAIN 4 HOURS LATER SO WHEN THE BLOCK WEARS OFF, YOU ARE NOT CAUGHT OFF GUARD  SMOKING:   Smoking results in incomplete healing of fractures (broken bones) and joints that my have been fused  Smoking and nicotine also prevents the growth of bone into ankle replacements and bone healing  It also slows the healing of muscles and skin (soft tissue)  Therefore, please do not have surgery if you continue to smoke  We reserve the right to cancel your surgery if we suspect that you are smoking  DO NOT use nicorette gum or other patches    Please find an alternative method to quit smoking before your surgery and do not restart after surgery to allow for healing  THREE RULES:    1  After surgery you will most likely be given the instructions KEEP YOUR TOES ABOVE YOUR NOSE    This means that you MUST have your feet elevated higher than your heart  Keeping your toes above your nose helps to heal the muscles and skin (soft tissues) by reducing swelling in your leg  This position also helps to prevent infection, and is very important in avoiding deep venous thrombosis (blood clots)  2  In order to keep the blood circulating in your legs and in order to avoid deep vein   thrombosis (blood clots), we ask patients to GET UP ONCE AN HOUR during the day  This means you should at least cross the room and come back  It does not mean you have to be up for long periods of time  In most cases we will not have people immediately put any weight on their operated part  This is important to prevent loosening of metal or other devices holding the bones together  It also prevents irritation of the soft tissues which can lead to prolonged healing  When we say get up once an hour, please walk, hop or move with an assisted device  This is important! 3  Do not do any excessive walking during the first few days after surgery  Recovering from surgery is a full-time task for the patient  Postoperative care is important to avoid irritating the skin incision, which can lead to infection  Please do not plan activities or go out of town for several weeks after surgery  AFTER YOUR SURGERY:   Bleeding through the bandage almost always occurs  Do not let this alarm you  Simply add more gauze or a towel, call us, and come in for a dressing change  If you think it is excessive, contact us immediately or go to the local emergency room   Do not get the bandage wet  Showering is possible with plastic protectors  Be very careful, as the bathroom can be wet and slippery    If you do get your dressing wet, it should be changed immediately  Please contact us   ONCE YOUR ARE OUT OF YOUR CAST AND/OR REMOVABLE BOOT, SWELLING MAY PERSIST FOR MANY MONTHS  THERE WILL ALSO BE A BLUISH DISCOLORATION OF YOUR LEG FOR MONTHS  THIS IS NORMAL AND PART OF THE USUAL POSTOPERATIVE EXPERIENCE  WEARING COMPRESSION HOSE (ELASTIC STOCKINGS) CAN HELP AVOID SOME OF THIS SWELLING   Ice the area 20 minutes every hour once the nerve block wears off  If you are in a cast or a splint, you may need to leave the ice on longer than 20 minutes in order to feel any benefits  DRESSING:   The purpose of the surgical dressing is to keep your wound and the surgical site protected from the environment  Most dressings contain splints, which help to hold your foot and ankle in a corrected position, and also allow the surgical site to heal properly  If you have a drain in place, this will need to be removed in 1 day after surgery  The time for the drain to be pulled will be written on your discharge instruction sheet  CAST  INSTRUCTIONS:  You may or may not get a cast following surgery  If you do, pay close attention to the following:     After application of a splint or cast, it is very important to elevate your leg for 24 to 72 hours  The injured area should be elevated well above the heart  Remember Toes above your Nose  Rest and elevation greatly reduce pain and speed the healing process by minimizing early swelling      CALL YOUR DOCTORS OFFICE OR VISIT LOCATION EMERGENCY ROOM IF YOU HAVE ANY OF THE FOLLOWING:     Significant increased pain, which may be caused by swelling (Strict elevation will alleviate this)   Numbness and tingling in your hand or foot, which may be caused by too much pressure on the nerves (There is always some numbness after surgery due to nerve blocks)   Burning and stinging, which may be caused by too much pressure on the skin   Excessive swelling below the cast, which may mean the cast is slowing your blood circulation   Loss of active movement of toes, which request an urgent evaluation   Loss of capillary refill  Pinch the tip of toes and maggie the skin  Release pressure and if the skin does not return pink then call the office immediately  DO NOT GET YOUR CAST WET  Bacteria thrive in moist dark areas  We do not want this  If your cast becomes wet, return to the office and we will apply another one  PAIN AFTER SURGERY:  Narcotic pain medication can and will depress your respiratory system if taken in excess  The goal of pain management with narcotics is to be comfortable not pain free  If you take enough narcotics to be pain free then you run the risk of stopping breathing  If this happens, call 911 immediately!  Pain in the heel is often  caused by pressure from the weight of your foot on the bed  Make sure your heel is suspended off the bed by keeping a pillow underneath your calf not your knee  Medications: You will be given narcotic pain medication  Do NOT drive while taking narcotic medications  Medications such as Darvocet, Percocet, Vicoden or Tylenol #3, also contain acetaminophen (Tylenol)  Do not take acetaminophen or Tylenol from home when taking theses medications  When you fill your prescription, you may ask the pharmacist if your pain medication has acetaminophen/Tylenol in it  It is okay to take Tylenol with Oxycontin/Oxycodone  Lovenox 40mg x 30d for DVT ppx  Narcotic medications commonly cause nausea  Taking them with food will decrease this side effect  If you are having extreme nausea, please contact us for an alternative medication or for something that can be taken with this medication to decrease the nausea  Also, narcotic medications frequently cause constipation   An increase of fiber, fruits and vegetables in your diet may alleviate this problem, or if necessary, you may use an over-the-counter medication such as senekot, colace, or Fibercon for constipation problems  You should resume all medications you were taking prior to the surgery unless otherwise specified  If you had fracture surgery, bony surgery like an osteotomy or fusion, or a surgery that requires bone healing, you are advised to take Vitamin D and Calcium to improve healing potential   Vitamin D3 4000 units/day and Calcium 1200mg/day  These are over the counter medications so please pick them up at the pharmacy when you are picking up your prescriptions  Activity:   Because of your recent foot surgery, your activity level will decrease  You will need to elevate your foot ABOVE the level of your heart for a minimum of four days  The length of time necessary for the swelling to go down, and for your wounds to heal properly depends greatly on your efforts here  Elevation is extremely important to avoid compromising the blood supply to your foot  Remember when your foot is down it will swell, which will increase pain and slow healing  Wiggle your toes frequently if possible  If you go home with a regional block, (a type of anesthesia) the foot and leg will be numb  Think of ways to get into your house and around the house until the block wears off  Keep in mind that it may be a legal issue if you drive while in a cast or splint, especially when the splint is on the right foot  You may call the Department of Motor Vehicles to schedule a road test if you have adaptive equipment applied to your car  The amount of weight you are allowed to bear on your foot will be written on your discharge sheet filled out at the time of surgery  The following is an explanation of the possibilities:     Non-weight bearing: You are to put NO weight whatsoever on your foot  When using crutches or a walker, your foot should not touch the ground, except when you are standing  Then, it may rest on the ground   If you are to be non-weight bearing, and you are not compliant, you could compromise the surgery  Some of our patients have been requesting prescriptions for a roll-a-bout knee scooter  [employer] and other insurances have been denying these claims, and you may either have to rent one or pay out of pocket to purchase one

## 2021-03-06 VITALS
SYSTOLIC BLOOD PRESSURE: 119 MMHG | HEIGHT: 63 IN | WEIGHT: 184 LBS | RESPIRATION RATE: 18 BRPM | DIASTOLIC BLOOD PRESSURE: 74 MMHG | OXYGEN SATURATION: 95 % | BODY MASS INDEX: 32.6 KG/M2 | TEMPERATURE: 97.7 F | HEART RATE: 80 BPM

## 2021-03-06 LAB
GLUCOSE SERPL-MCNC: 106 MG/DL (ref 65–140)
GLUCOSE SERPL-MCNC: 107 MG/DL (ref 65–140)
GLUCOSE SERPL-MCNC: 72 MG/DL (ref 65–140)
GLUCOSE SERPL-MCNC: 79 MG/DL (ref 65–140)

## 2021-03-06 PROCEDURE — 82948 REAGENT STRIP/BLOOD GLUCOSE: CPT

## 2021-03-06 PROCEDURE — 99024 POSTOP FOLLOW-UP VISIT: CPT | Performed by: PHYSICIAN ASSISTANT

## 2021-03-06 PROCEDURE — 97116 GAIT TRAINING THERAPY: CPT

## 2021-03-06 PROCEDURE — 97165 OT EVAL LOW COMPLEX 30 MIN: CPT

## 2021-03-06 PROCEDURE — 97163 PT EVAL HIGH COMPLEX 45 MIN: CPT

## 2021-03-06 RX ORDER — ZOLPIDEM TARTRATE 5 MG/1
5 TABLET ORAL ONCE
Status: DISCONTINUED | OUTPATIENT
Start: 2021-03-06 | End: 2021-03-06 | Stop reason: HOSPADM

## 2021-03-06 RX ORDER — OXYCODONE HYDROCHLORIDE 5 MG/1
5 TABLET ORAL EVERY 4 HOURS PRN
Qty: 30 TABLET | Refills: 0 | Status: SHIPPED | OUTPATIENT
Start: 2021-03-06 | End: 2021-03-11

## 2021-03-06 RX ADMIN — ENOXAPARIN SODIUM 40 MG: 40 INJECTION SUBCUTANEOUS at 06:18

## 2021-03-06 RX ADMIN — OXYCODONE HYDROCHLORIDE 10 MG: 10 TABLET ORAL at 03:02

## 2021-03-06 RX ADMIN — CEFAZOLIN SODIUM 2000 MG: 2 SOLUTION INTRAVENOUS at 08:38

## 2021-03-06 RX ADMIN — SODIUM CHLORIDE, SODIUM LACTATE, POTASSIUM CHLORIDE, AND CALCIUM CHLORIDE 125 ML/HR: .6; .31; .03; .02 INJECTION, SOLUTION INTRAVENOUS at 03:09

## 2021-03-06 RX ADMIN — HYDROMORPHONE HYDROCHLORIDE 2 MG: 2 TABLET ORAL at 11:05

## 2021-03-06 RX ADMIN — ACETAMINOPHEN 650 MG: 325 TABLET, FILM COATED ORAL at 00:27

## 2021-03-06 RX ADMIN — CEFAZOLIN SODIUM 2000 MG: 2 SOLUTION INTRAVENOUS at 00:28

## 2021-03-06 RX ADMIN — HYDROMORPHONE HYDROCHLORIDE 2 MG: 2 TABLET ORAL at 06:17

## 2021-03-06 RX ADMIN — OXYCODONE HYDROCHLORIDE 10 MG: 10 TABLET ORAL at 14:27

## 2021-03-06 RX ADMIN — OXYCODONE HYDROCHLORIDE 10 MG: 10 TABLET ORAL at 07:49

## 2021-03-06 RX ADMIN — ACETAMINOPHEN 650 MG: 325 TABLET, FILM COATED ORAL at 06:17

## 2021-03-06 RX ADMIN — INSULIN LISPRO 4 UNITS: 100 INJECTION, SOLUTION INTRAVENOUS; SUBCUTANEOUS at 12:59

## 2021-03-06 NOTE — OCCUPATIONAL THERAPY NOTE
Occupational Therapy Evaluation     Patient Name: Ni TALBOT Date: 3/6/2021  Problem List  Principal Problem:    Closed displaced trimalleolar fracture of left lower leg  Active Problems:    Advanced maternal age in multigravida, unspecified trimester    31 weeks gestation of pregnancy    Past Medical History  Past Medical History:   Diagnosis Date    Abnormal Pap smear of cervix     Anemia     Anxiety     Gestational diabetes     HPV (human papilloma virus) infection     Miscarriage      Past Surgical History  Past Surgical History:   Procedure Laterality Date    BUNIONECTOMY Bilateral     BUNIONECTOMY Right 2016     SECTION      [id number]    OTHER SURGICAL HISTORY      IVF egg retrieval              21 1330   OT Last Visit   OT Visit Date 21   Note Type   Note type Evaluation   Restrictions/Precautions   Weight Bearing Precautions Per Order Yes   LLE Weight Bearing Per Order NWB   Other Precautions WBS; Fall Risk;Pain   Pain Assessment   Pain Assessment Tool 0-10   Pain Score 8   Home Living   Type of Home House   Home Layout Two level  (1/2 bath on main level and bed with full 2nd level)   Bathroom Shower/Tub Tub/shower unit   Bathroom Toilet Standard   Bathroom Equipment Shower chair   Home Equipment Walker   Prior Function   Level of [place] Independent with ADLs and functional mobility   Lives With Significant other   ADL Assistance Independent   Falls in the last 6 months 1 to 4   ADL   Grooming Assistance 5  Supervision/Setup   UB Bathing Assistance 5  Supervision/Setup   Toileting Assistance  5  Supervision/Setup   Bed Mobility   Additional Comments Pt was received in recliner   Transfers   Sit to Stand 5  Supervision   Stand to Sit 5  Supervision   Balance   Static Sitting Good   Dynamic Sitting Good   Static Standing Fair -   Dynamic Standing Fair -   Activity Tolerance   Activity Tolerance Patient limited by pain   Nurse [address] Me[address]ia Assessment   RUE Assessment WFL   LUE Assessment   LUE Assessment WFL   Cognition   Following Commands Follows all commands and directions without difficulty   Comments Pt ID by wristband name and    Assessment   Assessment Patient evaluated by Occupational Therapy  Patient admitted with Closed displaced trimalleolar fracture of left lower leg  Pt is NWB and is 32 weeks pregnant  The patients occupational profile, medical and therapy history includes a expanded review of medical and/or therapy records and additional review of physical, cognitive, or psychosocial history related to current functional performance  Comorbidities affecting functional mobility and ADLS include: diabetes  Prior to admission, patient was independent with functional mobility with RW, requiring assist for ADLS, requiring assist for IADLS and living with family in a 2 level home with 1 steps to enter  Patient performed toileting and grooming at Sup  Transfers at Sup  This evaluation requires clinical decision making of low complexity, because the patients presents with no comorbidities that affect occupational performance and required no modification of tasks or assistance with consideration of a limited number of treatment options  The Barthel Index was used as a functional outcome tool presenting with a score of 65, iThe patient's raw score on the AM-PAC Daily Activity inpatient short form is 20, standardized score is 42 03, greater than 39 4  Patients at this level are likely to benefit from DC to home  Pt at baseline and required no skilled OT services  Patient has family support and recommendation for DME  Commode has been given and education on safety with ADL has been given  DC recommendation is home with increase support  Recommendation   OT Discharge Recommendation Return to previous environment with social support   Equipment Recommended Bedside commode; Shower transfer bench ($$)   [facility] Daily Activity Inpatient   Lower Body Dressing 3   Bathing 3   Toileting 3   Upper Body Dressing 4   Grooming 3   Eating 4   Daily Activity Raw Score 20   Daily Activity Standardized Score (Calc for Raw Score >=11) 42 03   Barthel Index   Feeding 10   Bathing 5   Grooming Score 5   Dressing Score 5   Bladder Score 10   Bowels Score 10   Toilet Use Score 5   Transfers (Bed/Chair) Score 10   Mobility (Level Surface) Score 0   Stairs Score 5   Barthel Index Score 65   Riky Lipoma, OT

## 2021-03-06 NOTE — ANESTHESIA POSTPROCEDURE EVALUATION
Post-Op Assessment Note    CV Status:  Stable  Pain Score: 0    Pain management: adequate     Mental Status:  Alert   Hydration Status:  Stable   PONV Controlled:  None   Airway Patency:  Patent      Post Op Vitals Reviewed: Yes      Staff: CRNA         No complications documented      BP    Temp    Pulse    Resp    SpO2

## 2021-03-06 NOTE — CASE MANAGEMENT
Pt is stable for DC and is in need of DME for home  Recommendations include, RW, knee walker, commode and shower chair  Cm met with pt who stated she has a shower chair that she is borrowing from her sister  She has a RW and her SO is ordering a knee walker from [employer]  All she would be in need of is a commode  Cm contacted ortho AP who was able to place an order for a commode  Referral has been made to [employer] for a commode  Cm delivered commode to pt's room from storage so pt was able to take it home with her  No further cm interventions needed at this time

## 2021-03-06 NOTE — PROGRESS NOTES
Progress Note - Orthopedics   Norval Mis 39 y o  female MRN: [id number]  Unit/Bed#: S -01 Encounter: [id number]    Assessment:  39year old 26 weeks pregnant female, POD 1 s/p Left ankle ORIF trimalleolar fracture    Plan:  -NON weight bearing to LLE  -PT/OT  -Pain control PRN  -post-op Ancef complete  -OB checked fetal heart tones post-op and were within normal limits and via tiger text with Dr Stone patient is clear to go from their standpoint  -lab work reviewed  -Lovenox and SCD to RLE for DVT ppx  -encourage incentive spirometry  -D/C planning, discharge once cleared by PT/OT  -Follow-up with Dr Lachman as an outpatient in 3 weeks    Subjective:  36year old 26 weeks pregnant female postop day 1 status post left ankle ORIF of trimalleolar fracture  Patient was seen examined at bedside  Patient states that she is overall doing well  She states that she is starting to regain sensation to the lower extremity stating she only has minimal numbness to the lateral aspect of the foot  Villegas was removed this morningand the patient is urinating  She denies any fever, chills, sweats  Vitals: Blood pressure 115/73, pulse 78, temperature 98 °F (36 7 °C), temperature source Oral, resp  rate 18, height 5' 2 5" (1 588 m), weight 83 5 kg (184 lb), last menstrual period 07/28/2020, SpO2 96 %, unknown if currently breastfeeding  ,Body mass index is 33 12 kg/m²  Intake/Output Summary (Last 24 hours) at 3/6/2021 0926  Last data filed at 3/6/2021 4236  Gross per 24 hour   Intake 1400 ml   Output 1700 ml   Net -300 ml       Invasive Devices     Peripheral Intravenous Line            Peripheral IV 03/05/21 Right Forearm less than 1 day                Physical Exam: General appearance: alert and oriented, in no acute distress  Head: Normocephalic, without obvious abnormality, atraumatic  Eyes: conjunctivae/corneas clear  PERRL, EOM's intact  Fundi benign    Lungs: no stridor or wheezing  Heart: no apparent distress  Ortho Exam:   Left Lower Extremity Exam  Inspection:  Postoperative splint is clean dry and intact  Palpation:  Compartments are soft and compressible  ROM:  Able to wiggle all 5 toes  Strength:  Able to wiggle all 5 toes  Neurovascular:  Sensation intact in DP/SP/Mcnair/Sa/T nerve distributions  Toes warm and perfused  Lab, Imaging and other studies: I have personally reviewed pertinent lab results      Peggy Hayward PA-C

## 2021-03-06 NOTE — PHYSICAL THERAPY NOTE
PHYSICAL THERAPY EVALUATION  NAME:  Marcelina Cook  DATE: 21    AGE:   39 y o  Mrn:   [id number]  ADMIT DX:  Closed displaced trimalleolar fracture of left ankle, initial encounter [[id number]]  Problem List:   Patient Active Problem List   Diagnosis    History of  delivery affecting pregnancy    Advanced maternal age in multigravida, unspecified trimester    Pregnancy resulting from in vitro fertilization, antepartum    Anxiety associated with depression    Supervision of pregnancy resulting from assisted reproductive technology, third trimester    31 weeks gestation of pregnancy    Closed displaced trimalleolar fracture of left lower leg    Insulin controlled White classification A2 gestational diabetes mellitus (GDM)         Length Of Stay: 0  Performed at least 2 patient identifiers during session: Name and Birthday  PHYSICAL THERAPY EVALUATION :    21 1302   PT Last Visit   PT Visit Date 21   Note Type   Note type Evaluation   Pain Assessment   Pain Assessment Tool 0-10   Pain Score 8   Effect of Pain on Daily Activities limits speed and indep of mobility, limits ability to sit still   Patient's Stated Pain Goal No pain   Hospital Pain Intervention(s) Repositioned; Ambulation/increased activity; Medication (See MAR); Elevated  (premedicated by nursing prior to session)   Home Living   Type of [address] [place] [address] Two level  (1STE, flight to shower)   [address]  (for 1 week s/p fx)   Additional Comments Pt reports having difficulty performing TIA And was doing flight to/from bedroom eithe sharon buttocks or on her knee to maintain NWB (carpeted stairs)   Prior Function   Level of [place] Independent with ADLs and functional mobility   Lives With Significant other;Family  (family)   ADL Assistance Independent   Falls in the last 6 months 1 to 4   Restrictions/Precautions   Weight Bearing Precautions Per Order Yes   LLE Weight Bearing Per Order NWB   Braces or Orthoses   (with splint)   Other Precautions WBS; Fall Risk;Pain   General   Additional Pertinent History OPEN REDUCTION W/ INTERNAL FIXATION (ORIF) ANKLE (Left) 3/5/2021 due to Closed displaced trimalleolar fracture of left ankle ( s/p fall)   Family/Caregiver Present Yes   Cognition   Overall Cognitive Status WFL   Arousal/Participation Alert   Memory Within functional limits   Following Commands Follows one step commands without difficulty   RUE Strength   RUE Overall Strength Within Functional Limits - able to perform ADL tasks with strength   LUE Strength   LUE Overall Strength Within Functional Limits - able to perform ADL tasks with strength   Strength RLE   R Hip Flexion 4/5   R Knee Extension 4/5   R Ankle Dorsiflexion 4/5   Strength LLE   L Hip Flexion   (able to SLR)   L Knee Extension   (NT, toes flex/ext)   Coordination   Movements are Fluid and Coordinated 1   Sensation   (vision and hearing)   Light Touch   RLE Light Touch Grossly intact   LLE Light Touch Grossly intact   Bed Mobility   Supine to Sit Unable to assess  (limited)   Transfers   Sit to Stand 5  Supervision   Additional items Increased time required;Verbal cues  (instruction for hand placement)   Stand to Sit 5  Supervision   Additional items Assist x 1; Increased time required;Verbal cues;Armrests   Ambulation/Elevation   Gait pattern Excessively slow; Step to;Short stride  (maintains NWB on LLE throughout)   Gait Assistance 5  Supervision   Additional items Assist x 1   Assistive Device Rolling walker   Distance 5'   Stair Management Assistance Not tested   Balance   Static Sitting Good   Static Standing Sonia Patel 5374  (w/walker)   Endurance Deficit   Endurance Deficit Yes   Endurance Deficit Description self reported fatigue    Activity Tolerance   Activity Tolerance Patient limited by fatigue;Patient limited by pain   Medical Staff Made Aware messaged ortho and care coordination w/ Wendi Heard from [address] spoke to Alana RN   Assessment   Prognosis Good   Problem List Decreased strength;Decreased range of motion;Decreased endurance; Impaired balance;Decreased mobility;Obesity;Pain;Orthopedic restrictions  (gait deviations)   Barriers to Discharge Inaccessible home environment   Barriers to Discharge Comments large TIA, second floor bedroom   Goals   Patient Goals to go home, less pain   STG Expiration Date 03/07/21   PT Treatment Day 1  (treatment documented in note)   Plan   Treatment/Interventions Functional transfer training;LE strengthening/ROM; Endurance training;Equipment eval/education; Bed mobility;Gait training;Patient/family training; Therapeutic exercise;Elevations   PT Frequency Follow-up visit only   Recommendation   PT Discharge Recommendation Return to previous environment with social support   Equipment Recommended Walker;Knee Scooter* ($)   Walker Package Recommended   (Pt has rolling walker, would benefit from knee scooter renta)   AM-PAC Basic Mobility Inpatient   Turning in Bed Without Bedrails 4   Lying on Back to Sitting on Edge of Flat Bed 3   Moving Bed to Chair 3   Standing Up From Chair 3   Walk in Room 3   Climb 3-5 Stairs 2   Basic Mobility Inpatient Raw Score 18   Basic Mobility Standardized Score 41 05   (Please find full objective findings from PT assessment regarding body systems outlined above)  Assessment: Pt is a 39 y o  female seen for PT evaluation s/p admit to [facility] on 3/5/2021 w/ Closed displaced trimalleolar fracture of left lower leg  Pt had above procedure, is NWB on LLE  Order placed for PT  Upon evaluation: Pt requires S assistance for transfers and ambulation with rolling walker for short distance    Pt's clinical presentation is currently unstable/unpredictable given the functional mobility deficits above, especially (but not limited to) decreased endurance, gait deviations, pain and orthopedic restrictions, coupled with fall risks including hx of falls and impaired balance, and combined with medical complications of +74 wks pregnant  Pt to benefit from continued skilled PT tx while in hospital and upon DC to address deficits as defined above and maximize level of functional mobility  Recommend trial w/ knee scooter and additional training for other alternatives for stairs  Goals: Pt will: Perform transfers with Supervision w/ proper hand technique to improve ease of transfers  Perform ambulation with knee scooter for 48' with  Supervision  to demonstrate compliance with WB limitations, improve gait quality and promote proper use of assistive device  Verbalize good understanding of alternatives for performing 1STE to return to home with TIA and demonstrate compliance with WB limitations with less effort    The patient's AM-PAC Basic Mobility Inpatient Short Form Raw Score is 18, Standardized Score is 41 05  A standardized score less than 42 9 suggests the patient may benefit from discharge to post-acute rehabilitation services, however please refer to therapist recommendation for discharge planning given other factors that may influence destination as pt will have physical A for mobility including TIA and stairs to second floor  Comorbidities affecting pt's physical performance at time of assessment include: obesity and miscarriage, IVF, gestational diabetes, anemia  Personal factors affecting pt at time of IE include: anxiety, steps to enter environment, multi-level environment, inability to perform IADLs, inability to navigate community distances and recent fall(s)       Onesimo Sever, PT, DPT    Past Medical History:   Diagnosis Date    Abnormal Pap smear of cervix     Anemia     Anxiety     Gestational diabetes     HPV (human papilloma virus) infection     Miscarriage      Past Surgical History:   Procedure Laterality Date    BUNIONECTOMY Bilateral     BUNIONECTOMY Right 2016     SECTION      [id number]    OTHER SURGICAL HISTORY IVF egg retrieval      PHYSICAL THERAPY TREATMENT NOTE  Time In:1330  Time Out:1400  Total Time: 30min  S:  Pt cooperative, denies any lightheadedness  Pt does report increased backpain with standing tolerance during session  O:  Transfers S w/ only one episode of instruction for hand placement  Amb w/ knee scooter with initial min A and verbal instruction, to gradual S  Pt amb 20'x2 limited due to back pain after 4 min standing tolerance @ knee scooter  Pt attributes to back pain to "baby positioning"  Pt and her father also instructed in alternatives to performing stairs including using chair @ top of step to sit and pivot @ buttocks, Performing stair backward, and having stool @ top step for ease of stand<>sit with stair performing (for flight)  Pt/family education Also discussed option of having transport WC for office appointments--they reported undecided by end of session  A:  Pt requires less physical assistance to perform mobility throughout session compared to initial eval, and verbalized good understanding of alternatives with stairs  Pt was able to amb further distances with knee scooter with faster gait and less effort, but did c/o back pain  P:  Recommend that pt have physical A for all stairs performance, trial of alternatives as above for stairs, knee scooter for main level mobility and walker for shorter distances       Warren Seth, PT, DPT

## 2021-03-10 ENCOUNTER — TELEPHONE (OUTPATIENT)
Dept: OBGYN CLINIC | Facility: HOSPITAL | Age: 46
End: 2021-03-10

## 2021-03-10 NOTE — TELEPHONE ENCOUNTER
Patients spouse Eulalia Cheek is calling requesting to speak with a nurse regarding this  They are very concerned about the coloring of her toes          CB: [phone]

## 2021-03-10 NOTE — TELEPHONE ENCOUNTER
Dr Lachman    Patient is concerned that her toes look black/blue/purple and some orange after her sx  She said she has no problem moving them        # [phone]

## 2021-03-10 NOTE — TELEPHONE ENCOUNTER
I spoke with patient and advised bruising is normal after surgery, orange color is probably surgical wash  Ice 30 min on 30 min off, elevation toes above the nose 23 hrs per day, up hrly  Patient taking tylenol 1000mg for pain due to pregnancy, pain better but still there   will check cap refill and call if greater than 3 seconds  Advised pictures look good

## 2021-03-11 ENCOUNTER — DOCUMENTATION (OUTPATIENT)
Dept: PERINATAL CARE | Facility: CLINIC | Age: 46
End: 2021-03-11

## 2021-03-11 NOTE — PROGRESS NOTES
Date:  21  RE: Gosia Ortiz    : 1975  Estimated Date of Delivery: 21  EGA: 31w0d  OB/GYN: Suzette Farah    Insulin controlled gestational diabetes            Via mychart     Current regimen:  Basaglar- 28 units at 9 or 10 pm daily (started 2/15)  2000 calorie GDM diet with 3 meals & 3 snacks  Patient was advised to aim for 45 gm CHO at all 3 meals and increase protein to 4-5 oz  Patient reported protein sources are typically shell fish, and some cheese,nuts  Patient dislikes meat  Per phone discussion patient was not always including protein in meals  Self-Blood Glucose Monitoring 4 times daily with an Accu-Chek Gudie glucose meter   Patient is unable to walk due to sciatica pain  Plan: Spoke with JANES Caicedo and Dr Will Hdez regarding plan due to upcoming ankle surgery on Friday  Patient also received Betamethasone shot today at [facility]  Plan was discussed at [facility] appointment and a [employer] message was sent to patient from JANES Caicedo:    -basaglar 34 units tonight 3/3/21; tomorrow night 3/4/21 (day before surgery) inject 20 units at bedtime ; post surgery return to 34 units daily unless dose in changed during hospital stay  -Due to 2 hours post meal glucose readings above 120, start Humalog or Novolog 4 units before each meal, hold when not eating, please add red ribbon to not confuse with long acting insulin     -Continue GDM diet, testing and reporting via flowsheet   -Always have glucose available to treat hypoglycemia, use 15 by 15 rule  -Continue close contact with diabetes education team    -Send [employer] on Monday, 3/8/21 to have glucose readings reviewed    Let me know if any issues with pharmacy  Hypoglycemia s/s and treatment were reviewed today  Spoke with pharmacy regarding prescriptions  Pharmacist stated Humalog is covered under patient's plan  21 HbA1c 5 4% down from 20 A1c 5 7%   Noted CMP results      Diabetes Educator   Diabetes & Pregnancy Program

## 2021-03-12 ENCOUNTER — ULTRASOUND (OUTPATIENT)
Dept: PERINATAL CARE | Facility: OTHER | Age: 46
End: 2021-03-12
Payer: COMMERCIAL

## 2021-03-12 VITALS
HEIGHT: 62 IN | BODY MASS INDEX: 33.65 KG/M2 | SYSTOLIC BLOOD PRESSURE: 94 MMHG | DIASTOLIC BLOOD PRESSURE: 74 MMHG | HEART RATE: 100 BPM

## 2021-03-12 DIAGNOSIS — O24.414 INSULIN CONTROLLED WHITE CLASSIFICATION A2 GESTATIONAL DIABETES MELLITUS (GDM): ICD-10-CM

## 2021-03-12 DIAGNOSIS — Z3A.32 32 WEEKS GESTATION OF PREGNANCY: Primary | ICD-10-CM

## 2021-03-12 DIAGNOSIS — O09.523 AMA (ADVANCED MATERNAL AGE) MULTIGRAVIDA 35+, THIRD TRIMESTER: ICD-10-CM

## 2021-03-12 PROCEDURE — 76818 FETAL BIOPHYS PROFILE W/NST: CPT | Performed by: OBSTETRICS & GYNECOLOGY

## 2021-03-12 NOTE — LETTER
March 12, 2021     Nick Zavala, [address] [place] [address]    Patient: Sudha Martínez   YOB: 1975   Date of Visit: 3/12/2021       Dear Dr Daniel Huerta: Thank you for referring Dwayne Majano to me for evaluation  Below are my notes for this consultation  If you have questions, please do not hesitate to call me  I look forward to following your patient along with you  Sincerely,        Florencia Agudelo MD        CC: No Recipients  Florencia Agudelo MD  3/12/2021  5:08 PM  Sign when Signing Visit  Ob ultrasound and NST    Fetal ultrasound examination for evaluation of HÉCTOR and NST at  32 2/7 weeks gestation  Hx of IVF, A2GDM  She is asymptomatic  See Ob procedures in EPIC  1  HÉCTOR  wnl 11 2      2  NST  equivocal;      3  BPP 8/8      Recommendations; 1  Twice a week NSTs, once a week HÉCTOR  2  Daily fetal movement counts  Thank you for referring your patient to our offices  If you have any further questions do not hesitate to contact us as [phone]      Florencia Agudelo MD

## 2021-03-12 NOTE — PROGRESS NOTES
Ob ultrasound and NST    Fetal ultrasound examination for evaluation of HÉCTOR and NST at  32 2/7 weeks gestation  Hx of IVF, A2GDM  She is asymptomatic  See Ob procedures in EPIC  1  HÉCTOR  wnl 11 2      2  NST  equivocal;      3  BPP 8/8      Recommendations; 1  Twice a week NSTs, once a week HÉCTOR  2  Daily fetal movement counts  Thank you for referring your patient to our offices  If you have any further questions do not hesitate to contact us as [phone]      Martin Resendez MD

## 2021-03-15 ENCOUNTER — ROUTINE PRENATAL (OUTPATIENT)
Dept: OBGYN CLINIC | Facility: CLINIC | Age: 46
End: 2021-03-15

## 2021-03-15 VITALS — SYSTOLIC BLOOD PRESSURE: 122 MMHG | DIASTOLIC BLOOD PRESSURE: 84 MMHG

## 2021-03-15 DIAGNOSIS — O09.813 SUPERVISION OF PREGNANCY RESULTING FROM ASSISTED REPRODUCTIVE TECHNOLOGY, THIRD TRIMESTER: ICD-10-CM

## 2021-03-15 DIAGNOSIS — S82.852S CLOSED DISPLACED TRIMALLEOLAR FRACTURE OF LEFT ANKLE, SEQUELA: ICD-10-CM

## 2021-03-15 DIAGNOSIS — O34.219 HISTORY OF CESAREAN DELIVERY AFFECTING PREGNANCY: ICD-10-CM

## 2021-03-15 DIAGNOSIS — Z34.93 PREGNANCY, OBSTETRICAL CARE, THIRD TRIMESTER: Primary | ICD-10-CM

## 2021-03-15 DIAGNOSIS — O24.414 INSULIN CONTROLLED WHITE CLASSIFICATION A2 GESTATIONAL DIABETES MELLITUS (GDM): ICD-10-CM

## 2021-03-15 DIAGNOSIS — O09.529 ADVANCED MATERNAL AGE IN MULTIGRAVIDA, UNSPECIFIED TRIMESTER: ICD-10-CM

## 2021-03-15 PROCEDURE — PNV: Performed by: STUDENT IN AN ORGANIZED HEALTH CARE EDUCATION/TRAINING PROGRAM

## 2021-03-15 NOTE — PROGRESS NOTES
Doing well, no complaints  Denies cramping, bleeding/LOF  +fm  Would like tdap vaccine next visit  Reviewed yellow folder

## 2021-03-15 NOTE — PROGRESS NOTES
39 y o  [id number] at [address] presents for routine prenatal visit  She denies contractions/leakage of fluid/vaginal bleeding  She feels good fetal movement  Problem List Items Addressed This Visit        Endocrine    Insulin controlled White classification A2 gestational diabetes mellitus (GDM)  Follows w/ diabetic pathways - recently increased night insulin and added novolog before meals  2x weekly APFS  Regular growth US       Musculoskeletal and Integument    Closed displaced trimalleolar fracture of left lower leg  S/p ORIF   Daily lovenox injections       Other    History of  delivery affecting pregnancy  RLTCS scheduled    Advanced maternal age in multigravida, unspecified trimester  2x weekly APFS    Supervision of pregnancy resulting from assisted reproductive technology, third trimester      Other Visit Diagnoses     Pregnancy, obstetrical care, third trimester    -  Primary  Routine visit in 2 wks

## 2021-03-16 LAB
SL AMB  POCT GLUCOSE, UA: NORMAL
SL AMB POCT URINE PROTEIN: NORMAL

## 2021-03-18 ENCOUNTER — DOCUMENTATION (OUTPATIENT)
Dept: PERINATAL CARE | Facility: CLINIC | Age: 46
End: 2021-03-18

## 2021-03-18 NOTE — PROGRESS NOTES
Date:  21  RE: Sterling Moore    : 1975  Estimated Date of Delivery: 21  EGA: 33w2d  OB/GYN: Cheral Barefoot    Insulin controlled gestational diabetes        Fasting- 73 mg/dL this morning stated by patient over the phone today    Via [employer]     Current regimen:  Basaglar- 34 units at 9 or 10 pm daily  Humalog- 6 units before dinner meal  Note was supposed to also take 4 units before breakfast and lunch but patient misunderstood and thought she was just supposed to take it before dinner  2000 calorie GDM diet with 3 meals & 3 snacks  Patient was advised to aim for 45 gm CHO at all 3 meals and increase protein to 4-5 oz  Patient reported protein sources are typically shell fish, and some cheese,nuts  Patient dislikes meat  Per phone discussion patient was not always including protein in meals  Self-Blood Glucose Monitoring 4 times daily with an Accu-Chek Gudie glucose meter   Patient is unable to walk due to sciatica pain  Plan:   Basaglar- continue 34 units at 9 or 10 pm daily  Humalog- add 4 units before breakfast and lunch, and continue 6 units before dinner,  hold when not eating, please add red ribbon to not confuse with long acting insulin  Continue GDM diet, testing and reporting via flowsheet  Always have glucose available to treat hypoglycemia, use 15 by 15 rule  3/4/21 US: normal fetal growth and HÉCTOR  21 HbA1c 5 4% down from 20 A1c 5 7%   Noted CMP results    Aminta Gonzales RD, LDN  Diabetes Educator   Diabetes [facility] Pregnancy Program

## 2021-03-19 ENCOUNTER — ULTRASOUND (OUTPATIENT)
Dept: PERINATAL CARE | Facility: OTHER | Age: 46
End: 2021-03-19
Payer: COMMERCIAL

## 2021-03-19 VITALS
HEART RATE: 83 BPM | DIASTOLIC BLOOD PRESSURE: 78 MMHG | HEIGHT: 62 IN | SYSTOLIC BLOOD PRESSURE: 112 MMHG | BODY MASS INDEX: 33.65 KG/M2

## 2021-03-19 DIAGNOSIS — O24.414 INSULIN CONTROLLED WHITE CLASSIFICATION A2 GESTATIONAL DIABETES MELLITUS (GDM): Primary | ICD-10-CM

## 2021-03-19 DIAGNOSIS — O09.523 AMA (ADVANCED MATERNAL AGE) MULTIGRAVIDA 35+, THIRD TRIMESTER: ICD-10-CM

## 2021-03-19 DIAGNOSIS — Z3A.33 33 WEEKS GESTATION OF PREGNANCY: ICD-10-CM

## 2021-03-19 PROCEDURE — 59025 FETAL NON-STRESS TEST: CPT | Performed by: OBSTETRICS & GYNECOLOGY

## 2021-03-19 PROCEDURE — 76815 OB US LIMITED FETUS(S): CPT | Performed by: OBSTETRICS & GYNECOLOGY

## 2021-03-19 NOTE — LETTER
March 19, 2021     Marian Cunningham, [address] [address]    Patient: Sterling Moore   YOB: 1975   Date of Visit: 3/19/2021       Dear Dr Donna Morales: Thank you for referring Kj Sarabia to me for evaluation  Below are my notes for this consultation  If you have questions, please do not hesitate to call me  I look forward to following your patient along with you  Sincerely,        Viola Hale MD        CC: No Recipients  Viola Hale MD  3/19/2021  6:07 PM  Sign when Signing Visit   NST is reactive   Viola Hale MD

## 2021-03-19 NOTE — PROGRESS NOTES
Pt  Reported active fetal movement at home between visit  Daily fetal kick count reviewed and emphasized  Patient verbalized understanding of all and was receptive      Shannan West RN

## 2021-03-22 DIAGNOSIS — O24.414 INSULIN CONTROLLED GESTATIONAL DIABETES MELLITUS (GDM) IN THIRD TRIMESTER: Primary | ICD-10-CM

## 2021-03-22 RX ORDER — INSULIN GLARGINE 100 [IU]/ML
36 INJECTION, SOLUTION SUBCUTANEOUS
Qty: 15 ML | Refills: 0 | Status: SHIPPED | OUTPATIENT
Start: 2021-03-22 | End: 2021-04-24 | Stop reason: HOSPADM

## 2021-03-23 ENCOUNTER — TELEPHONE (OUTPATIENT)
Dept: OTHER | Facility: OTHER | Age: 46
End: 2021-03-23

## 2021-03-23 ENCOUNTER — TELEPHONE (OUTPATIENT)
Dept: OBGYN CLINIC | Facility: MEDICAL CENTER | Age: 46
End: 2021-03-23

## 2021-03-24 NOTE — TELEPHONE ENCOUNTER
Antony David called due to a blood glucose of 334  She has A2 GDM  Reports her numbers throughout the day were fine  She did not eat anything unusual  She has occasionally received error messages on her glucometer  She feels well - mild nausea but usually has this w/ her reflux, no vomiting  Good fetal movement  I asked that she recheck her glucose which she did while I was on the phone - now is 194  This recheck was 15 minutes following the first check and pt did not eat anything in between  We discussed my concern that her glucometer is not accurate  Would expect her to feel unwell with level that high + would not expect such large decrease in 15 min  Recommend she take her insulin as usual + eat high protein snack as recommended by diabetic pathways  Will take one more level prior to bed  Will call back with any change in symptoms or glucose level > 200 in which case I would recommend she come to the hospital for close monitoring of her glucose  She will touch base with diabetic pathways tomorrow to calibrate her glucometer - if accurate will need increase in insulin dosing

## 2021-03-25 ENCOUNTER — DOCUMENTATION (OUTPATIENT)
Dept: PERINATAL CARE | Facility: CLINIC | Age: 46
End: 2021-03-25

## 2021-03-25 NOTE — PROGRESS NOTES
Date:  21  RE: Jacinto Denney    : 1975  Estimated Date of Delivery: 21  EGA: 34w2d  OB/GYN: Rere Mention    Insulin controlled gestational diabetes                        Via mychart     Current regimen:  Basaglar- 38 units at 9 or 10 pm daily  Humalog- 6 units before dinner meal  Note was supposed to also take 4 units before breakfast and lunch but patient misunderstood and thought she was just supposed to take it before dinner  2000 calorie GDM diet with 3 meals & 3 snacks  Patient was advised to aim for 45 gm CHO at all 3 meals and increase protein to 4-5 oz  Patient reported protein sources are typically shell fish, and some cheese,nuts  Patient dislikes meat  Per phone discussion patient was not always including protein in meals  Self-Blood Glucose Monitoring 4 times daily with an Accu-Chek Gudie glucose meter   Patient is unable to walk due to sciatica pain  Plan:   Basaglar- increase to 42 units at 9 or 10 pm daily  Humalog- increase to 6  units before breakfast and lunch, and continue 6 units before dinner,  hold when not eating  Continue GDM diet, testing and reporting via flowsheet  Always have glucose available to treat hypoglycemia, use 15 by 15 rule  3/4/21 US: normal fetal growth and HÉCTOR  3/19/21: normal NST and HÉCTOR  21 HbA1c 5 4% down from 20 A1c 5 7%   Noted CMP results    Stevan Luong RD, LDN  Diabetes Educator   [facility]

## 2021-03-26 ENCOUNTER — TELEPHONE (OUTPATIENT)
Dept: OBGYN CLINIC | Facility: MEDICAL CENTER | Age: 46
End: 2021-03-26

## 2021-03-26 ENCOUNTER — OFFICE VISIT (OUTPATIENT)
Dept: OBGYN CLINIC | Facility: CLINIC | Age: 46
End: 2021-03-26

## 2021-03-26 ENCOUNTER — ULTRASOUND (OUTPATIENT)
Dept: PERINATAL CARE | Facility: OTHER | Age: 46
End: 2021-03-26
Payer: COMMERCIAL

## 2021-03-26 VITALS
DIASTOLIC BLOOD PRESSURE: 68 MMHG | HEIGHT: 63 IN | BODY MASS INDEX: 32.59 KG/M2 | HEART RATE: 86 BPM | SYSTOLIC BLOOD PRESSURE: 104 MMHG

## 2021-03-26 DIAGNOSIS — Z3A.34 34 WEEKS GESTATION OF PREGNANCY: ICD-10-CM

## 2021-03-26 DIAGNOSIS — O09.523 AMA (ADVANCED MATERNAL AGE) MULTIGRAVIDA 35+, THIRD TRIMESTER: ICD-10-CM

## 2021-03-26 DIAGNOSIS — O24.414 INSULIN CONTROLLED GESTATIONAL DIABETES MELLITUS (GDM) IN THIRD TRIMESTER: ICD-10-CM

## 2021-03-26 DIAGNOSIS — S82.852D CLOSED DISPLACED TRIMALLEOLAR FRACTURE OF LEFT ANKLE WITH ROUTINE HEALING, SUBSEQUENT ENCOUNTER: Primary | ICD-10-CM

## 2021-03-26 DIAGNOSIS — O24.414 INSULIN CONTROLLED WHITE CLASSIFICATION A2 GESTATIONAL DIABETES MELLITUS (GDM): Primary | ICD-10-CM

## 2021-03-26 PROCEDURE — 99024 POSTOP FOLLOW-UP VISIT: CPT | Performed by: ORTHOPAEDIC SURGERY

## 2021-03-26 PROCEDURE — 76815 OB US LIMITED FETUS(S): CPT | Performed by: OBSTETRICS & GYNECOLOGY

## 2021-03-26 PROCEDURE — 59025 FETAL NON-STRESS TEST: CPT | Performed by: OBSTETRICS & GYNECOLOGY

## 2021-03-26 NOTE — TELEPHONE ENCOUNTER
Patient sees Dr Sandi Olszewski  Patient calling on script for compression stocking  She is asking which office you recommend her to go and if the script could be faxed to that location  Please give her a call relating this    CB # [phone]

## 2021-03-26 NOTE — PATIENT INSTRUCTIONS
Nonweightbearing on the ankle    Continue lovenox for blood clot prevention  May shower, do not soak in a tub/pool/ocean/etc for another 4 weeks  Begin PT  Scar massage- pea sized amount of lotion, massage into scar for 5 minutes each day  Compression stocking (Knee high, 20-30mm Hg) to be worn at all times while awake  Recommend taking the following supplements: Vitamin D3- 4000 units per day and Calcium 1200 mg per day  This will help with bone healing  Wear the boot at all times except when showering and in PT, even to sleep at night

## 2021-03-26 NOTE — TELEPHONE ENCOUNTER
She should purchase this from [employer]  They are anywhere from $8-25 on [employer]  They also sell these at most supermarkets and Pharmacies    Her insurance won't cover it so its just better to stay away from the medical supply companies

## 2021-03-26 NOTE — PROGRESS NOTES
Stevphen Ganser, M D  Attending, Orthopaedic Surgery  Foot and Ankle  [facility]      ORTHOPAEDIC FOOT AND ANKLE POST-OP VISIT     Procedure:     ORIF left trimalleolar fracture       Date of surgery:   3/5/21      PLAN  1  Weightbearing Status- NWB operative extremity  2  DVT prophylaxis- Lovenox 40mg Subcu Daily  3  Continue to elevate 23hrs/day getting up 1x per hour to prevent a blood clot  4  Pain control- OTC pain medication  5  RTC in 3 weeks  6  Xrays needed next visit - yes Weightbearing Ankle    History of Present Illness:   Chief Complaint: Left ankle post op     Asmita Shell is a 39 y o  female who is being seen for post-operative visit for the above procedure  Pain is well controlled and the patient has successfully transitioned to OTC pain medicines  she is taking Lovenox 40 mg subcu daily for DVT prophylaxis  Patient has been NWB in a Splint  Review of Systems:  General- denies fever/chills  Respiratory- denies cough or SOB  Cardio- denies chest pain or palpitations  GI- denies abdominal pain  Musculoskeletal- Negative except noted above  Skin- denies rashes or wounds    Physical Exam:   LMP 07/28/2020 (Exact Date)   General/Constitutional: No apparent distress: well-nourished and well developed  Eyes: normal ocular motion  Lymphatic: No appreciable lymphadenopathy  Respiratory: Non-labored breathing  Vascular: No edema, swelling or tenderness, except as noted in detailed exam   Integumentary: No impressive skin lesions present, except as noted in detailed exam   Neuro: No ataxia or tremors noted  Psych: Normal mood and affect, oriented to person, place and time  Appropriate affect  Musculoskeletal: Normal, except as noted in detailed exam and in HPI      Examination    left        Incision Clean, dry, intact  Sutures Removed this visit    Ecchymosis none    Swelling Mild    Sensation Intact to light touch throughout sural, saphenous, superficial peroneal, deep peroneal and medial/lateral plantar nerve distributions  Plainfield-Mary 5 07 filament (10g) testing deferred  Cardiovascular Brisk capillary refill < 2 seconds,intact DP and PT pulses    Special Tests None      Imaging Studies:   None        James R Lachman, MD  Foot & Ankle Surgery   Department of [address] [place] [address]      I personally performed the service  Michela Mo Lachman, MD    Scribe Attestation    I,:  Morris Alexander MA am acting as a scribe while in the presence of the attending physician :       I,:  German Cleveland MD personally performed the services described in this documentation    as scribed in my presence :

## 2021-03-26 NOTE — PROGRESS NOTES
Pt  Reported active fetal movement at home between visit  Daily fetal kick count reviewed and emphasized  Patient verbalized understanding of all and was receptive      Michaelle Kehr, RN

## 2021-03-30 DIAGNOSIS — Z74.09 IMMOBILITY: ICD-10-CM

## 2021-03-30 DIAGNOSIS — T14.8XXA FRACTURE: ICD-10-CM

## 2021-03-30 DIAGNOSIS — Z3A.31 31 WEEKS GESTATION OF PREGNANCY: ICD-10-CM

## 2021-03-30 DIAGNOSIS — Z3A.31 31 WEEKS GESTATION OF PREGNANCY: Primary | ICD-10-CM

## 2021-03-30 PROBLEM — Z3A.34 34 WEEKS GESTATION OF PREGNANCY: Status: ACTIVE | Noted: 2021-02-24

## 2021-03-31 ENCOUNTER — ROUTINE PRENATAL (OUTPATIENT)
Dept: OBGYN CLINIC | Facility: CLINIC | Age: 46
End: 2021-03-31

## 2021-03-31 VITALS — DIASTOLIC BLOOD PRESSURE: 80 MMHG | SYSTOLIC BLOOD PRESSURE: 108 MMHG

## 2021-03-31 DIAGNOSIS — T14.8XXA FRACTURE: ICD-10-CM

## 2021-03-31 DIAGNOSIS — Z3A.31 31 WEEKS GESTATION OF PREGNANCY: ICD-10-CM

## 2021-03-31 DIAGNOSIS — Z74.09 IMMOBILITY: ICD-10-CM

## 2021-03-31 DIAGNOSIS — Z34.93 PREGNANCY, OBSTETRICAL CARE, THIRD TRIMESTER: Primary | ICD-10-CM

## 2021-03-31 DIAGNOSIS — O24.419 GESTATIONAL DIABETES MELLITUS (GDM) IN SECOND TRIMESTER, GESTATIONAL DIABETES METHOD OF CONTROL UNSPECIFIED: ICD-10-CM

## 2021-03-31 LAB
SL AMB  POCT GLUCOSE, UA: NORMAL
SL AMB POCT URINE PROTEIN: NORMAL

## 2021-03-31 PROCEDURE — PNV: Performed by: OBSTETRICS & GYNECOLOGY

## 2021-03-31 RX ORDER — BLOOD SUGAR DIAGNOSTIC
STRIP MISCELLANEOUS
Qty: 100 EACH | Refills: 4 | Status: SHIPPED | OUTPATIENT
Start: 2021-03-31 | End: 2021-04-24 | Stop reason: HOSPADM

## 2021-03-31 NOTE — PROGRESS NOTES
Patient presents for a routine prenatal visit  [id number]  35W 0D  Good Fetal Movement  No LOF,bleeding, cramping or discharge  No current complaints at this time  Urine-neg    Labor and delivery consent reviewed and signed by patient at today's visit  Patient has Yellow folder  Patient needs lovenox refills  Patient also needs the test strips for glucose monitoring  Tdap Needed  Patient would like to know if she can get a script to have Tdap at the pharmacy  GBS at next visit

## 2021-03-31 NOTE — PROGRESS NOTES
Problem List Items Addressed This Visit        Other    Pregnancy, obstetrical care, third trimester - Primary     35 weeks    S/p left ankle fracture repair - daily lovenox until mobile  Still mostly sedentary, will continue script       PNC - growth this week  A2GDM - PNC managing insulin dosing  H/o prior csectin for repeat    tdap given today         Relevant Orders    POCT urine dip (Completed)      Other Visit Diagnoses     31 weeks gestation of pregnancy        Relevant Medications    enoxaparin (LOVENOX) 40 mg/0 4 mL    Fracture        Relevant Medications    enoxaparin (LOVENOX) 40 mg/0 4 mL    Immobility        Relevant Medications    enoxaparin (LOVENOX) 40 mg/0 4 mL    Gestational diabetes mellitus (GDM) in second trimester, gestational diabetes method of control unspecified        Relevant Medications    Accu-Chek Guide test strip

## 2021-03-31 NOTE — PATIENT INSTRUCTIONS
Pregnancy at [address] to [address]:   What changes are happening in my body? You are considered full term at the beginning of 37 weeks  Your breathing may be easier if your baby has moved down into a head-down position  You may need to urinate more often because the baby may be pressing on your bladder  You may also feel more discomfort and get tired easily  How do I care for myself at this stage of my pregnancy? · Eat a variety of healthy foods  Healthy foods include fruits, vegetables, whole-grain breads, low-fat dairy foods, beans, lean meats, and fish  Drink liquids as directed  Ask how much liquid to drink each day and which liquids are best for you  Limit caffeine to less than 200 milligrams each day  Limit your intake of fish to 2 servings each week  Choose fish low in mercury such as canned light tuna, shrimp, salmon, cod, or tilapia  Do not  eat fish high in mercury such as swordfish, tilefish, doug mackerel, and shark  · Take prenatal vitamins as directed  Your need for certain vitamins and minerals, such as folic acid, increases during pregnancy  Prenatal vitamins provide some of the extra vitamins and minerals you need  Prenatal vitamins may also help to decrease the risk of certain birth defects  · Rest as needed  Put your feet up if you have swelling in your ankles and feet  · Do not smoke  Smoking increases your risk of a miscarriage and other health problems during your pregnancy  Smoking can cause your baby to be born early or weigh less at birth  Ask your healthcare provider for information if you need help quitting  · Do not drink alcohol  Alcohol passes from your body to your baby through the placenta  It can affect your baby's brain development and cause fetal alcohol syndrome (FAS)  FAS is a group of conditions that causes mental, behavior, and growth problems  · Talk to your healthcare provider before you take any medicines    Many medicines may harm your baby if you take them when you are pregnant  Do not take any medicines, vitamins, herbs, or supplements without first talking to your healthcare provider  Never use illegal or street drugs (such as marijuana or cocaine) while you are pregnant  · Talk to your healthcare provider before you travel  You may not be able to travel in an airplane after 36 weeks  He may also recommend that you avoid long road trips  What are some safety tips during pregnancy? · Avoid hot tubs and saunas  Do not use a hot tub or sauna while you are pregnant, especially during your first trimester  Hot tubs and saunas may raise your baby's temperature and increase the risk of birth defects  · Avoid toxoplasmosis  This is an infection caused by eating raw meat or being around infected cat feces  It can cause birth defects, miscarriages, and other problems  Wash your hands after you touch raw meat  Make sure any meat is well-cooked before you eat it  Avoid raw eggs and unpasteurized milk  Use gloves or ask someone else to clean your cat's litter box while you are pregnant  · Ask your healthcare provider about travel  The most comfortable time to travel is during the second trimester  Ask your healthcare provider if you can travel after 36 weeks  You may not be able to travel in an airplane after 36 weeks  He may also recommend that you avoid long road trips  What changes are happening with my baby? By 38 weeks, your baby may weigh between 6 and 9 pounds  Your baby may be about 14 inches long from the top of the head to the rump (baby's bottom)  Your baby hears well enough to know your voice  As your baby gets larger, you may feel fewer kicks and more stretching and rolling  Your baby may move into a head-down position  Your baby will also rest lower in your abdomen  What do I need to know about prenatal care? Your healthcare provider will check your blood pressure and weight   You may also need the following:  · A urine test  may also be done to check for sugar and protein  These can be signs of gestational diabetes or infection  Protein in your urine may also be a sign of preeclampsia  Preeclampsia is a condition that can develop during week 20 or later of your pregnancy  It causes high blood pressure, and it can cause problems with your kidneys and other organs  · A blood test  may be done to check for anemia (low iron level)  · A Tdap vaccine  may be recommended by your healthcare provider  · A group B strep test  is a test that is done to check for group B strep infection  Group B strep is a type of bacteria that may be found in the vagina or rectum  It can be passed to your baby during delivery if you have it  Your healthcare provider will take swab your vagina or rectum and send the sample to the lab for tests  · Fundal height  is a measurement of your uterus to check your baby's growth  This number is usually the same as the number of weeks that you have been pregnant  Your healthcare provider may also check your baby's position  · Your baby's heart rate  will be checked  When should I seek immediate care? · You develop a severe headache that does not go away  · You have new or increased vision changes, such as blurred or spotted vision  · You have new or increased swelling in your face or hands  · You have vaginal spotting or bleeding  · Your water broke or you feel warm water gushing or trickling from your vagina  When should I contact my healthcare provider? · You have more than 5 contractions in 1 hour  · You notice any changes in your baby's movements  · You have abdominal cramps, pressure, or tightening  · You have a change in vaginal discharge  · You have chills or a fever  · You have vaginal itching, burning, or pain  · You have yellow, green, white, or foul-smelling vaginal discharge      · You have pain or burning when you urinate, less urine than usual, or pink or bloody urine  · You have questions or concerns about your condition or care  CARE AGREEMENT:   You have the right to help plan your care  Learn about your health condition and how it may be treated  Discuss treatment options with your healthcare providers to decide what care you want to receive  You always have the right to refuse treatment  The above information is an  only  It is not intended as medical advice for individual conditions or treatments  Talk to your doctor, nurse or pharmacist before following any medical regimen to see if it is safe and effective for you  © Copyright [address] [place] [address] Information is for End User's use only and may not be sold, redistributed or otherwise used for commercial purposes   All illustrations and images included in CareNotes® are the copyrighted property of [employer] D [employer] M [employer]  or [address] [place] [address] 08-Apr-2019 23:00

## 2021-03-31 NOTE — ASSESSMENT & PLAN NOTE
35 weeks    S/p left ankle fracture repair - daily lovenox until mobile  Still mostly sedentary, will continue script       PNC - growth this week  A2GDM - PNC managing insulin dosing  H/o prior csectin for repeat    tdap given today

## 2021-04-01 ENCOUNTER — TELEPHONE (OUTPATIENT)
Dept: OBGYN CLINIC | Facility: CLINIC | Age: 46
End: 2021-04-01

## 2021-04-01 ENCOUNTER — DOCUMENTATION (OUTPATIENT)
Dept: PERINATAL CARE | Facility: CLINIC | Age: 46
End: 2021-04-01

## 2021-04-01 ENCOUNTER — EVALUATION (OUTPATIENT)
Dept: PHYSICAL THERAPY | Facility: CLINIC | Age: 46
End: 2021-04-01
Payer: COMMERCIAL

## 2021-04-01 DIAGNOSIS — S82.852D CLOSED DISPLACED TRIMALLEOLAR FRACTURE OF LEFT ANKLE WITH ROUTINE HEALING, SUBSEQUENT ENCOUNTER: Primary | ICD-10-CM

## 2021-04-01 PROCEDURE — 97016 VASOPNEUMATIC DEVICE THERAPY: CPT | Performed by: PHYSICAL THERAPIST

## 2021-04-01 PROCEDURE — 97162 PT EVAL MOD COMPLEX 30 MIN: CPT | Performed by: PHYSICAL THERAPIST

## 2021-04-01 PROCEDURE — 97110 THERAPEUTIC EXERCISES: CPT | Performed by: PHYSICAL THERAPIST

## 2021-04-01 NOTE — PROGRESS NOTES
Date:  21  RE: Maru Doherty    : 1975  Estimated Date of Delivery: 21  EGA: 35w2d  OB/GYN: Lowella Bench    Insulin controlled gestational diabetes           Fasting number - 90  Second number - 117  Third number - 117  4th number - 145             Fasting number - 90  Second number - 120  Third number -   4th number - 146             Fasting number - 78  Second number  - 138  Third number - 130  4th number - 129             Fasting number - 79  Second number - 104  Third number - 146  4th number - 131             Fasting number - 107  Second number - 116  Third number - 142  4th number - 99             Fasting number - 88  Second number - 116  Third number - 104  4th number - 114             Fasting number - 85  Second number - 177                [address]     Current regimen:  Basaglar- 42 units at 9 or 10 pm daily  Humalog- 6 units before breakfast, lunch and dinner meals  Note was supposed to also take 4 units before breakfast and lunch but patient misunderstood and thought she was just supposed to take it before dinner  2000 calorie GDM diet with 3 meals & 3 snacks  Patient was advised to aim for 45 gm CHO at all 3 meals and increase protein to 4-5 oz  Patient reported protein sources are typically shell fish, and some cheese,nuts  Patient dislikes meat  Per phone discussion patient was not always including protein in meals  Self-Blood Glucose Monitoring 4 times daily with an Accu-Chek Gudie glucose meter   Patient is unable to walk due to sciatica pain  Plan:   Basaglar- increase to 42 units at 9 or 10 pm daily  Humalog- increase to 8 units before all three meals, hold when not eating  Continue GDM diet, testing and reporting via flowsheet  Always have glucose available to treat hypoglycemia, use 15 by 15 rule    3/4/21 US: normal fetal growth and HÉCTOR   3/26/21: normal NST and HÉCTOR  21 HbA1c 5 4% down from 20 A1c 5 7%   Noted CMP results    Date to report next:  Thursday, 4/8/21    Aury Romano RD, LDN  Diabetes Educator   [facility]

## 2021-04-01 NOTE — PATIENT INSTRUCTIONS
Thank you for choosing us for your  care today  If you have any questions about your ultrasound or care, please do not hesitate to contact us or your primary obstetrician  [facility] is ordering the heparin  Once it comes in, call us and we will order the appropriate syringes and needles  Some general instructions for your pregnancy are:     Protect against coronavirus: Continue to practice social distancing, wear a mask, and wash your hands often  Pregnant women are increased risk of severe COVID  Notify your primary care doctor if you have any symptoms including cough, shortness of breath or difficulty breathing, fever, chills, muscle pain, sore throat, or loss of taste or smell  Pregnant women can receive the coronavirus vaccine   Exercise: Aim for 22 minutes per day (150 minutes per week) of regular exercise  Walking is great!  Nutrition: aim for calcium-rich and iron-rich foods as well as healthy sources of protein   Protect against the flu: get yourself and your entire household vaccinated against influenza  This will protect your baby   Learn about Preeclampsia: preeclampsia is a common, serious high blood pressure complication in pregnancy  A blood pressure of 958XGPF (systolic or top number) or 46XAAP (diastolic or bottom number) is not normal and needs evaluation by your doctor   If you smoke, try to reduce how many cigarettes you smoke or try to quit completely  Do not vape   Other warning signs to watch out for in pregnancy or postpartum: chest pain, obstructed breathing or shortness of breath, seizures, thoughts of hurting yourself or your baby, bleeding, a painful or swollen leg, fever, or headache (see [facility] POST-BIRTH Warning Signs campaign)  If these happen call 911  Itching is also not normal in pregnancy and if you experience this, especially over your hands and feet, potentially worse at night, notify your doctors     Lastly, if you are contacted regarding participation in a survey about your experience in our office, please know that we take any feedback you provide seriously and use it to improve how we deliver care through our center

## 2021-04-01 NOTE — PROGRESS NOTES
PT Evaluation     Today's date: 2021  Patient name: Levi Steinberg  : 1975  MRN: [id number]  Referring provider: Sae Posada MD  Dx:   Encounter Diagnosis     ICD-10-CM    1  Closed displaced trimalleolar fracture of left ankle with routine healing, subsequent encounter  S82 [id number] Ambulatory referral to Physical Therapy       Start Time: 30  Stop Time: 1017  Total time in clinic (min): 47 minutes    Assessment  Assessment details: Pt is a 38 y/o female presenting to physical therapy s/p L ankle ORIF on 3/5/21 after a slip and fall on ice  Pt is pregnant and is scheduled for a  on 21, and is using a WC to be compliant with NWB because of this  She presents with limited L ankle AROM and strength, decreased joint mobility, and increased swelling  Pt would benefit from physical therapy in order to improve the aforementioned deficits and to progress in WB tolerance and return to PLOF  Impairments: abnormal gait, abnormal or restricted ROM, activity intolerance, impaired balance, impaired physical strength, lacks appropriate home exercise program, pain with function and weight-bearing intolerance  Functional limitations: weight bearing  Symptom irritability: lowUnderstanding of Dx/Px/POC: good   Prognosis: good    Goals  ST weeks  1  Pt will demonstrate independence with HEP  2  Pt will improve L ankle A/PROM by at least 10%  3  Pt will improve L ankle strength by at least 1/2 grade  4  Pt will be able to perform SLS for at least 5s without use of BUE  LT weeks  1  Pt will improve L ankle to equal that of the L to return to PLOF  2  Pt will improve L ankle strength to at least 4+/5  3  Pt will be able to perform SLS on foam for at least 30s to show improved balance and proprioception  4  Pt will demonstrate WNL joint mobility to return to PLOF  5  Pt will ambulate without pain and without gait abnormalities        Plan  Patient would benefit from: skilled physical therapy  Planned modality interventions: cryotherapy and thermotherapy: hydrocollator packs  Planned therapy interventions: therapeutic exercise, therapeutic activities, stretching, strengthening, patient education, neuromuscular re-education, massage, manual therapy, balance, gait training and home exercise program  Frequency: 2x week  Duration in weeks: 8  Treatment plan discussed with: patient        Subjective Evaluation    History of Present Illness  Date of onset: 2021  Date of surgery: 3/5/2021  Mechanism of injury: surgery and trauma  Mechanism of injury: Pt slipped on black ice, and broke her L ankle  She had an ORIF on 3/5/21  She is currently NWB and is scheduled for a  on 21  She is using a WC in the community, but around the house she is using the knee scooter  She reports her pain is controlled, but she has altered sensation around the incision  Not a recurrent problem   Quality of life: fair    Pain  Current pain ratin  At best pain ratin  At worst pain ratin  Quality: sharp, dull ache and discomfort  Relieving factors: medications and ice  Aggravating factors: standing, walking, stair climbing, running and lifting  Progression: improved    Social Support  Steps to enter house: yes  2  Stairs in house: yes   20  Lives in: multiple-level home    Treatments  Current treatment: immobilization  Patient Goals  Patient goals for therapy: increased strength, independence with ADLs/IADLs, return to sport/leisure activities, return to work, increased motion, decreased pain, decreased edema and improved balance          Objective     Observations   Left Ankle/Foot   Positive for incision       Additional Observation Details  Incision c/d/i    Active Range of Motion   Left Ankle/Foot   Dorsiflexion (ke): 0 degrees   Dorsiflexion (kf): 0 degrees   Inversion: 15 degrees   Eversion: 5 degrees     Right Ankle/Foot   Dorsiflexion (ke): 3 degrees   Dorsiflexion (kf): 6 degrees Inversion: 30 degrees   Eversion: 10 degrees     Joint Play   Left Ankle/Foot  Hypomobile in the talocrural joint       Strength/Myotome Testing     Left Ankle/Foot   Dorsiflexion: 4-  Plantar flexion: 4-  Inversion: 4-  Eversion: 4-    Additional Strength Details  In available range    Swelling   Left Ankle/Foot   Metatarsal heads: 22 cm  Figure 8: 51 cm  [place]: 23 5 cm    Right Ankle/Foot   Metatarsal heads: 21 cm  Figure 8: 48 8 cm  [place]: 21 cm             Precautions: Pregnant, NWB      Manuals 4/1            L ankle PROM                                                    Neuro Re-Ed             BAPS             Rockerboard                                                                              Ther Ex             Ankle 4-way HEP            Seated heel slides             Toe crunches                                                                              Ther Activity                                       Gait Training                                       Modalities             Game Ready 10'

## 2021-04-02 ENCOUNTER — ROUTINE PRENATAL (OUTPATIENT)
Dept: PERINATAL CARE | Facility: OTHER | Age: 46
End: 2021-04-02
Payer: COMMERCIAL

## 2021-04-02 ENCOUNTER — ULTRASOUND (OUTPATIENT)
Dept: PERINATAL CARE | Facility: OTHER | Age: 46
End: 2021-04-02
Payer: COMMERCIAL

## 2021-04-02 VITALS
HEART RATE: 86 BPM | HEIGHT: 63 IN | SYSTOLIC BLOOD PRESSURE: 126 MMHG | DIASTOLIC BLOOD PRESSURE: 79 MMHG | BODY MASS INDEX: 32.59 KG/M2

## 2021-04-02 DIAGNOSIS — Z36.89 ENCOUNTER FOR ULTRASOUND TO CHECK FETAL GROWTH: ICD-10-CM

## 2021-04-02 DIAGNOSIS — O09.529 ADVANCED MATERNAL AGE IN MULTIGRAVIDA, UNSPECIFIED TRIMESTER: Primary | ICD-10-CM

## 2021-04-02 DIAGNOSIS — Z98.890 HISTORY OF ORTHOPEDIC SURGERY: ICD-10-CM

## 2021-04-02 DIAGNOSIS — Z74.09 IMMOBILITY: ICD-10-CM

## 2021-04-02 DIAGNOSIS — O24.414 INSULIN CONTROLLED WHITE CLASSIFICATION A2 GESTATIONAL DIABETES MELLITUS (GDM): ICD-10-CM

## 2021-04-02 DIAGNOSIS — Z33.1 PREGNANT STATE, INCIDENTAL: Primary | ICD-10-CM

## 2021-04-02 DIAGNOSIS — O09.819 PREGNANCY RESULTING FROM IN VITRO FERTILIZATION, ANTEPARTUM: ICD-10-CM

## 2021-04-02 DIAGNOSIS — Z3A.35 35 WEEKS GESTATION OF PREGNANCY: ICD-10-CM

## 2021-04-02 PROCEDURE — 76816 OB US FOLLOW-UP PER FETUS: CPT | Performed by: OBSTETRICS & GYNECOLOGY

## 2021-04-02 PROCEDURE — 99215 OFFICE O/P EST HI 40 MIN: CPT | Performed by: OBSTETRICS & GYNECOLOGY

## 2021-04-02 PROCEDURE — 59025 FETAL NON-STRESS TEST: CPT | Performed by: OBSTETRICS & GYNECOLOGY

## 2021-04-02 PROCEDURE — NC001 PR NO CHARGE

## 2021-04-02 RX ORDER — HEPARIN SODIUM 5000 [USP'U]/ML
5000 INJECTION, SOLUTION INTRAVENOUS; SUBCUTANEOUS EVERY 12 HOURS SCHEDULED
Qty: 42 ML | Refills: 0 | Status: SHIPPED | OUTPATIENT
Start: 2021-04-02 | End: 2021-04-06 | Stop reason: SDUPTHER

## 2021-04-02 NOTE — LETTER
April 2, 2021     Juan C Dodd, [address] [place] [address]    Patient: Audra Powers   YOB: 1975   Date of Visit: 4/2/2021       Dear Dr Ulysses Scudder: Thank you for referring Bakari Suggs to me for evaluation  Below are my notes for this consultation  She can't afford lovenox so I ordered heparin for her  If you have questions, please do not hesitate to call me  I look forward to following your patient along with you  Sincerely,        Kavya De La Rosa MD        CC: No Recipients  Kavya De La Rosa MD  4/2/2021 12:47 PM  Sign when Signing Visit  [address]: Ms Kalani Arauz was seen today at 35w2d for NST (found under the pregnancy episode) which I reviewed the RN assessment and agree, and fetal growth ultrasound (see ultrasound report under OB procedures tab)  Please don't hesitate to contact our office with any concerns or questions    Kavya De La Rosa MD

## 2021-04-02 NOTE — PROGRESS NOTES
[address]: Ms Prince Shaw was seen today at 35w2d for NST (found under the pregnancy episode) which I reviewed the RN assessment and agree, and fetal growth ultrasound (see ultrasound report under OB procedures tab)  Please don't hesitate to contact our office with any concerns or questions    Fozia Be MD

## 2021-04-02 NOTE — PROGRESS NOTES
Pt  Reported active fetal movement at home between visit  Daily fetal kick count reviewed and emphasized  Patient verbalized understanding of all and was receptive      Indira Evans RN

## 2021-04-06 ENCOUNTER — ROUTINE PRENATAL (OUTPATIENT)
Dept: OBGYN CLINIC | Facility: CLINIC | Age: 46
End: 2021-04-06

## 2021-04-06 ENCOUNTER — OFFICE VISIT (OUTPATIENT)
Dept: PHYSICAL THERAPY | Facility: CLINIC | Age: 46
End: 2021-04-06
Payer: COMMERCIAL

## 2021-04-06 VITALS — DIASTOLIC BLOOD PRESSURE: 80 MMHG | SYSTOLIC BLOOD PRESSURE: 112 MMHG

## 2021-04-06 DIAGNOSIS — Z74.09 IMMOBILITY: ICD-10-CM

## 2021-04-06 DIAGNOSIS — Z34.93 PREGNANCY, OBSTETRICAL CARE, THIRD TRIMESTER: Primary | ICD-10-CM

## 2021-04-06 DIAGNOSIS — Z3A.35 35 WEEKS GESTATION OF PREGNANCY: ICD-10-CM

## 2021-04-06 DIAGNOSIS — Z98.890 HISTORY OF ORTHOPEDIC SURGERY: ICD-10-CM

## 2021-04-06 DIAGNOSIS — S82.852D CLOSED DISPLACED TRIMALLEOLAR FRACTURE OF LEFT ANKLE WITH ROUTINE HEALING, SUBSEQUENT ENCOUNTER: Primary | ICD-10-CM

## 2021-04-06 LAB
SL AMB  POCT GLUCOSE, UA: NORMAL
SL AMB POCT URINE PROTEIN: NORMAL

## 2021-04-06 PROCEDURE — PNV: Performed by: OBSTETRICS & GYNECOLOGY

## 2021-04-06 PROCEDURE — 97112 NEUROMUSCULAR REEDUCATION: CPT | Performed by: PHYSICAL THERAPIST

## 2021-04-06 PROCEDURE — 97110 THERAPEUTIC EXERCISES: CPT | Performed by: PHYSICAL THERAPIST

## 2021-04-06 PROCEDURE — 97016 VASOPNEUMATIC DEVICE THERAPY: CPT | Performed by: PHYSICAL THERAPIST

## 2021-04-06 RX ORDER — HEPARIN SODIUM 5000 [USP'U]/ML
5000 INJECTION, SOLUTION INTRAVENOUS; SUBCUTANEOUS EVERY 12 HOURS SCHEDULED
Qty: 42 ML | Refills: 0 | Status: ON HOLD | OUTPATIENT
Start: 2021-04-06 | End: 2021-04-24 | Stop reason: SDUPTHER

## 2021-04-06 NOTE — PROGRESS NOTES
Patient presents for a routine prenatal visit  [id number]  35W6D  Good Fetal Movement  No LOF,bleeding, cramping or discharge  No current complaints at this time  Urine-Trace +1 Protein  Neg Gluc    Consent signed and scanned under media  UTD tdap and flu

## 2021-04-06 NOTE — PROGRESS NOTES
Daily Note     Today's date: 2021  Patient name: Julia Victor  : 1975  MRN: [id number]  Referring provider: Briana Martin MD  Dx:   Encounter Diagnosis     ICD-10-CM    1  Closed displaced trimalleolar fracture of left ankle with routine healing, subsequent encounter  S82 852D        Start Time: 1045  Stop Time: 4010  Total time in clinic (min): 44 minutes    Subjective: Pt offers no new complaints today  Objective: See treatment diary below      Assessment: Minimal discomfort with L ankle PROM, most limitation in eversion  Difficulty with BAPS and inv/ev of rockerboard secondary to decreased motor control and swelling  Pre VND: 49 6cm, Post VND: 49 6cm      Plan: Progress as tolerated       Precautions: Pregnant, NWB      Manuals  4/           L ankle PROM  8'                                                  Neuro Re-Ed             BAPS  20x ea           Rockerboard  20x ea                                                                            Ther Ex             Ankle 4-way HEP            Seated heel slides  10"x10           Toe crunches  20x           Seated HR/TR  20x ea           Spider walks  20x                                                  Ther Activity                                       Gait Training                                       Modalities             Game Ready 10' 10'

## 2021-04-07 PROBLEM — Z3A.34 34 WEEKS GESTATION OF PREGNANCY: Status: RESOLVED | Noted: 2021-02-24 | Resolved: 2021-04-07

## 2021-04-07 NOTE — ASSESSMENT & PLAN NOTE
36 weeks  Repeat csection scheduled for 4/30/3031    Anticoagulation s/p orthopedic surgery - issue with coverage of lovenox, attempting to get heparin BID If not available at local pharmacy, then will attempt homestar    GDM - in contact with [facility] for control of blood sugars

## 2021-04-07 NOTE — PROGRESS NOTES
Problem List Items Addressed This Visit        Other    Pregnancy, obstetrical care, third trimester - Primary     36 weeks  Repeat csection scheduled for 4/30/3031    Anticoagulation s/p orthopedic surgery - issue with coverage of lovenox, attempting to get heparin BID If not available at local pharmacy, then will attempt homestar    GDM - in contact with [facility] for control of blood sugars         Relevant Orders    POCT urine dip (Completed)      Other Visit Diagnoses     History of orthopedic surgery        Relevant Medications    Heparin Sodium, Porcine, (heparin, porcine,) 5,000 units/mL    Immobility        Relevant Medications    Heparin Sodium, Porcine, (heparin, porcine,) 5,000 units/mL    35 weeks gestation of pregnancy        Relevant Medications    Heparin Sodium, Porcine, (heparin, porcine,) 5,000 units/mL

## 2021-04-09 ENCOUNTER — ULTRASOUND (OUTPATIENT)
Dept: PERINATAL CARE | Facility: OTHER | Age: 46
End: 2021-04-09
Payer: COMMERCIAL

## 2021-04-09 ENCOUNTER — DOCUMENTATION (OUTPATIENT)
Dept: PERINATAL CARE | Facility: CLINIC | Age: 46
End: 2021-04-09

## 2021-04-09 VITALS
SYSTOLIC BLOOD PRESSURE: 117 MMHG | HEIGHT: 63 IN | DIASTOLIC BLOOD PRESSURE: 72 MMHG | HEART RATE: 78 BPM | BODY MASS INDEX: 32.59 KG/M2

## 2021-04-09 DIAGNOSIS — Z3A.36 36 WEEKS GESTATION OF PREGNANCY: ICD-10-CM

## 2021-04-09 DIAGNOSIS — O24.414 INSULIN CONTROLLED WHITE CLASSIFICATION A2 GESTATIONAL DIABETES MELLITUS (GDM): ICD-10-CM

## 2021-04-09 DIAGNOSIS — O09.819 PREGNANCY RESULTING FROM IN VITRO FERTILIZATION, ANTEPARTUM: Primary | ICD-10-CM

## 2021-04-09 PROCEDURE — 59025 FETAL NON-STRESS TEST: CPT | Performed by: OBSTETRICS & GYNECOLOGY

## 2021-04-09 PROCEDURE — 76815 OB US LIMITED FETUS(S): CPT | Performed by: OBSTETRICS & GYNECOLOGY

## 2021-04-09 NOTE — PROGRESS NOTES
Pt  Reported active fetal movement at home between visit  Daily fetal kick count reviewed and emphasized  Patient verbalized understanding of all and was receptive      Lloyd Hanson RN

## 2021-04-09 NOTE — PROGRESS NOTES
Please refer to the [facility] ultrasound report in Ob Procedures for additional information regarding today's visit

## 2021-04-09 NOTE — PROGRESS NOTES
Date:  21  RE: Bettie Ulloa    : 1975  Estimated Date of Delivery: 21  EGA: 36w5d  OB/GYN: Morales Gómez    Insulin controlled gestational diabetes                  Via mychart     Current regimen:  Basaglar- 42 units at 9 or 10 pm daily  Humalog- 6 units before breakfast, lunch and dinner meals  Note was supposed to also take 4 units before breakfast and lunch but patient misunderstood and thought she was just supposed to take it before dinner  2000 calorie GDM diet with 3 meals & 3 snacks  Patient was advised to aim for 45 gm CHO at all 3 meals and increase protein to 4-5 oz  Patient reported protein sources are typically shell fish, and some cheese,nuts  Patient dislikes meat  Per phone discussion patient was not always including protein in meals  Self-Blood Glucose Monitoring 4 times daily with an Accu-Chek Gudie glucose meter   Patient is unable to walk due to sciatica pain  Plan: [employer] message to patient from FAUSTINA MARRERO          3/4/21 US: normal fetal growth and HÉCTOR   3/26/21: normal NST and HÉCTOR  21 HbA1c 5 4% down from 20 A1c 5 7%   Noted CMP results    Date to report next:  Thursday, 21    Mike Mcallister RD, LDN  Diabetes Educator   [facility]

## 2021-04-13 ENCOUNTER — APPOINTMENT (OUTPATIENT)
Dept: PHYSICAL THERAPY | Facility: CLINIC | Age: 46
End: 2021-04-13
Payer: COMMERCIAL

## 2021-04-13 ENCOUNTER — ROUTINE PRENATAL (OUTPATIENT)
Dept: OBGYN CLINIC | Facility: CLINIC | Age: 46
End: 2021-04-13

## 2021-04-13 VITALS — DIASTOLIC BLOOD PRESSURE: 80 MMHG | SYSTOLIC BLOOD PRESSURE: 124 MMHG

## 2021-04-13 DIAGNOSIS — Z34.93 PREGNANCY, OBSTETRICAL CARE, THIRD TRIMESTER: Primary | ICD-10-CM

## 2021-04-13 LAB
SL AMB  POCT GLUCOSE, UA: NORMAL
SL AMB POCT URINE PROTEIN: NORMAL

## 2021-04-13 PROCEDURE — PNV: Performed by: OBSTETRICS & GYNECOLOGY

## 2021-04-13 PROCEDURE — 87150 DNA/RNA AMPLIFIED PROBE: CPT | Performed by: OBSTETRICS & GYNECOLOGY

## 2021-04-13 NOTE — ASSESSMENT & PLAN NOTE
36w6d    H/o orthopedic procedure, reduced mobility - has heparin finally  Discussed BID dosing stop AM prior day before csection    A2GDM - reporting sugars to diabetic pathway  Plan for NST weekly until delivery (transportation is an issue at this time)    Heartburn - reviewed OTC medication, dosing intervals  Now taking prilosec  If ineffective, consider prescription at next visit

## 2021-04-13 NOTE — PROGRESS NOTES
Patient presents for a routine prenatal visit  [id number]  36W 6D  Good Fetal Movement  No LOF,bleeding, cramping  Has some discharge that has an odor  Patient stated she has acid reflux  Stated she took prilosec about 45mnts ago with no relief  Patient was told by MFM at 36 weeks she could stop aspirin    Urine-neg  GBS to be collected today    L&D signed and scanned under media

## 2021-04-13 NOTE — PROGRESS NOTES
Problem List Items Addressed This Visit        Other    Pregnancy, obstetrical care, third trimester - Primary     36w6d    H/o orthopedic procedure, reduced mobility - has heparin finally  Discussed BID dosing stop AM prior day before csection    A2GDM - reporting sugars to diabetic pathway  Plan for NST weekly until delivery (transportation is an issue at this time)    Heartburn - reviewed OTC medication, dosing intervals  Now taking prilosec  If ineffective, consider prescription at next visit            Relevant Orders    POCT urine dip (Completed)    Strep B DNA probe, amplification

## 2021-04-14 ENCOUNTER — DOCUMENTATION (OUTPATIENT)
Dept: PERINATAL CARE | Facility: CLINIC | Age: 46
End: 2021-04-14

## 2021-04-14 NOTE — PROGRESS NOTES
Date:  04/15/21  RE: Levi Steinberg    : 1975  Estimated Date of Delivery: 21  EGA: 42w4d  OB/GYN: Christopher Kenyon  Insulin controlled gestational diabetes    On  I forgot to do fasting number  Sorry, I've just been sick with acid reflux and throwing up a lot  I think that may be messing me up at times  I know as of my Friday appt  baby has been perfect, thank goodness           124  100  119          119  148  133       85  96  121  116       114  116  110  115       95  104  103  126       93  126    Via mychart     Current regimen:  Basaglar- 42 units at 9 or 10 pm daily  Humalog- 10 units before breakfast, lunch and dinner meals  2000 calorie GDM diet with 3 meals & 3 snacks  Patient was advised to aim for 45 gm CHO at all 3 meals and increase protein to 4-5 oz  Patient reported protein sources are typically shell fish, and some cheese,nuts  Patient dislikes meat  Per phone discussion patient was not always including protein in meals  Self-Blood Glucose Monitoring 4 times daily with an Accu-Chek Gudie glucose meter   Patient is unable to walk due to sciatica pain  Plan:   Basaglar--Increase from 42 to 46 units at 9-10 PM  Humalog--continue above dose  Continue diet & monitoring  21 US: normal fetal growth and HÉCTOR   21 HbA1c 5 4% down from 20 A1c 5 7%      scheduled for 21    Date to report next:  Monday, 21    Oma Orourke, MS,RD,CDE  Diabetes Educator   [facility]

## 2021-04-15 ENCOUNTER — PATIENT MESSAGE (OUTPATIENT)
Dept: PERINATAL CARE | Facility: CLINIC | Age: 46
End: 2021-04-15

## 2021-04-15 LAB — GP B STREP DNA SPEC QL NAA+PROBE: NEGATIVE

## 2021-04-16 ENCOUNTER — OFFICE VISIT (OUTPATIENT)
Dept: OBGYN CLINIC | Facility: CLINIC | Age: 46
End: 2021-04-16

## 2021-04-16 ENCOUNTER — ROUTINE PRENATAL (OUTPATIENT)
Dept: OBGYN CLINIC | Facility: CLINIC | Age: 46
End: 2021-04-16
Payer: COMMERCIAL

## 2021-04-16 ENCOUNTER — APPOINTMENT (OUTPATIENT)
Dept: RADIOLOGY | Facility: AMBULARY SURGERY CENTER | Age: 46
End: 2021-04-16
Attending: ORTHOPAEDIC SURGERY
Payer: COMMERCIAL

## 2021-04-16 VITALS — WEIGHT: 184 LBS | HEIGHT: 63 IN | BODY MASS INDEX: 32.6 KG/M2

## 2021-04-16 DIAGNOSIS — O09.529 ADVANCED MATERNAL AGE IN MULTIGRAVIDA, UNSPECIFIED TRIMESTER: ICD-10-CM

## 2021-04-16 DIAGNOSIS — O24.414 INSULIN CONTROLLED WHITE CLASSIFICATION A2 GESTATIONAL DIABETES MELLITUS (GDM): ICD-10-CM

## 2021-04-16 DIAGNOSIS — Z34.93 PREGNANCY, OBSTETRICAL CARE, THIRD TRIMESTER: Primary | ICD-10-CM

## 2021-04-16 DIAGNOSIS — S82.852D CLOSED DISPLACED TRIMALLEOLAR FRACTURE OF LEFT ANKLE WITH ROUTINE HEALING, SUBSEQUENT ENCOUNTER: ICD-10-CM

## 2021-04-16 DIAGNOSIS — S82.852D CLOSED DISPLACED TRIMALLEOLAR FRACTURE OF LEFT ANKLE WITH ROUTINE HEALING, SUBSEQUENT ENCOUNTER: Primary | ICD-10-CM

## 2021-04-16 PROCEDURE — 59025 FETAL NON-STRESS TEST: CPT | Performed by: OBSTETRICS & GYNECOLOGY

## 2021-04-16 PROCEDURE — 73610 X-RAY EXAM OF ANKLE: CPT

## 2021-04-16 PROCEDURE — 99024 POSTOP FOLLOW-UP VISIT: CPT | Performed by: ORTHOPAEDIC SURGERY

## 2021-04-16 NOTE — PROGRESS NOTES
MELIZA Shaw  Attending, Orthopaedic Surgery  Foot and Ankle  [facility]      ORTHOPAEDIC FOOT AND ANKLE POST-OP VISIT     Procedure:     ORIF left trimalleolar fracture       Date of surgery:   3/5/21      PLAN  1  Weightbearing Status- PWB operative extremity  2  DVT prophylaxis- Heparin - continue until fully weight bearing on the ankle  3  Continue PT  4  Pain control- OTC pain medication  5  RTC in 6 weeks  6  Xrays needed next visit - yes Weightbearing Ankle    History of Present Illness:   Chief Complaint: left ankle post op     Saad Richardson is a 39 y o  female who is being seen for post-operative visit for the above procedure  Pain is well controlled and the patient has successfully transitioned to OTC pain medicines  she is taking heparin as prescribed for DVT prophylaxis  Patient has been NWB in a CAM boot  Review of Systems:  General- denies fever/chills  Respiratory- denies cough or SOB  Cardio- denies chest pain or palpitations  GI- denies abdominal pain  Musculoskeletal- Negative except noted above  Skin- denies rashes or wounds    Physical Exam:   Ht 5' 3" (1 6 m)   Wt 83 5 kg (184 lb)   LMP 07/28/2020 (Exact Date)   BMI 32 59 kg/m²   General/Constitutional: No apparent distress: well-nourished and well developed  Eyes: normal ocular motion  Lymphatic: No appreciable lymphadenopathy  Respiratory: Non-labored breathing  Vascular: No edema, swelling or tenderness, except as noted in detailed exam   Integumentary: No impressive skin lesions present, except as noted in detailed exam   Neuro: No ataxia or tremors noted  Psych: Normal mood and affect, oriented to person, place and time  Appropriate affect  Musculoskeletal: Normal, except as noted in detailed exam and in HPI  Examination    left        Incision Clean, dry, intact  Sutures Previously removed      Ecchymosis none    Swelling Mild    Sensation Intact to light touch throughout sural, saphenous, superficial peroneal, deep peroneal and medial/lateral plantar nerve distributions  [place]-Mary 5 07 filament (10g) testing deferred  Cardiovascular Brisk capillary refill < 2 seconds,intact DP and PT pulses    Special Tests None      Imaging Studies:   3 views of the left ankle were taken, reviewed and interpreted independently that demonstrate hardware is in the expected position without evidence of loosening or hardware failure  interval healing noted at fracture sites  Reviewed by me personally  Karron Flatness Lachman, MD  Foot & Ankle Surgery   Department of [address] [place] [address]      I personally performed the service  Karron Flatness Lachman, MD    Scribe Attestation    I,:  Lawanda Talamantes PA-C am acting as a scribe while in the presence of the attending physician :       I,:  Zuleyka Masters MD personally performed the services described in this documentation    as scribed in my presence :

## 2021-04-16 NOTE — PATIENT INSTRUCTIONS
4 days of partial weight bearing 50%  Then, 4 days of partial weight bearing 75%  Then, 4 days of partial weight bearing 90%  Then full weight bearing in the boot and wean your crutches/rolling walker  Then 2 weeks of weightbearing as tolerated in the CAM boot  You may begin weaning your boot and transitioning to a sneaker (5/15)  It is important to do this gradually to avoid aggravating the healing process  1  5/15, you may come out of the boot into a sneaker for 2 hours  2  5/16, you may come out of the boot into a sneaker for 4 hours,  3  The next day, you may come out of the boot into a sneaker for 6 hours  4  Continue this (adding 2 hours per day) as you tolerate  For example, if you do 6 hours out of the boot into a sneaker and your foot swells more than usual at night and it is difficult to control the discomfort, do not advance to 8 hours the next day, stay at 6 hours until you are able to tolerate it  Elevation, Ice and tylenol and staying off of it at night will be important to aide in this transition out of the boot  Swelling and soreness are normal as you begin to do more with the injured leg  May DC aspirin/lovenox, no longer needed  May shower, do not soak in a tub/pool/ocean/etc for another 1 weeks  Continue PT  Scar massage- pea sized amount of lotion, massage into scar for 5 minutes each day  Compression stocking (Knee high, 20-30mm Hg) to be worn at all times while awake  Recommend taking the following supplements: Vitamin D3 4000 units per day and Calcium 1200 mg per day  This will help with bone healing

## 2021-04-19 ENCOUNTER — ROUTINE PRENATAL (OUTPATIENT)
Dept: OBGYN CLINIC | Facility: CLINIC | Age: 46
End: 2021-04-19

## 2021-04-19 VITALS — SYSTOLIC BLOOD PRESSURE: 122 MMHG | DIASTOLIC BLOOD PRESSURE: 80 MMHG

## 2021-04-19 DIAGNOSIS — Z34.93 PREGNANCY, OBSTETRICAL CARE, THIRD TRIMESTER: Primary | ICD-10-CM

## 2021-04-19 LAB
SL AMB  POCT GLUCOSE, UA: NORMAL
SL AMB POCT URINE PROTEIN: NORMAL

## 2021-04-19 PROCEDURE — PNV: Performed by: OBSTETRICS & GYNECOLOGY

## 2021-04-19 NOTE — PROGRESS NOTES
Patient presents for a routine prenatal visit  [id number]  37W5D  Good Fetal Movement  No LOF,bleeding, cramping or discharge  No current complaints at this time  Urine-neg    GBS WNL    L&D signed and scanned under media

## 2021-04-19 NOTE — ASSESSMENT & PLAN NOTE
37 weeks    Heparin dosing finally in place, BID  Discussed last dose AM prior to surgery  Signs of labor and fetal kick counts reviewed

## 2021-04-19 NOTE — PATIENT INSTRUCTIONS
Pregnancy at 28 to [address]:   What changes are happening in my body? You are considered full term at the beginning of 37 weeks  Your breathing may be easier if your baby has moved down into a head-down position  You may need to urinate more often because the baby may be pressing on your bladder  You may also feel more discomfort and get tired easily  How do I care for myself at this stage of my pregnancy? · Eat a variety of healthy foods  Healthy foods include fruits, vegetables, whole-grain breads, low-fat dairy foods, beans, lean meats, and fish  Drink liquids as directed  Ask how much liquid to drink each day and which liquids are best for you  Limit caffeine to less than 200 milligrams each day  Limit your intake of fish to 2 servings each week  Choose fish low in mercury such as canned light tuna, shrimp, salmon, cod, or tilapia  Do not  eat fish high in mercury such as swordfish, tilefish, doug mackerel, and shark  · Take prenatal vitamins as directed  Your need for certain vitamins and minerals, such as folic acid, increases during pregnancy  Prenatal vitamins provide some of the extra vitamins and minerals you need  Prenatal vitamins may also help to decrease the risk of certain birth defects  · Rest as needed  Put your feet up if you have swelling in your ankles and feet  · Do not smoke  Smoking increases your risk of a miscarriage and other health problems during your pregnancy  Smoking can cause your baby to be born early or weigh less at birth  Ask your healthcare provider for information if you need help quitting  · Do not drink alcohol  Alcohol passes from your body to your baby through the placenta  It can affect your baby's brain development and cause fetal alcohol syndrome (FAS)  FAS is a group of conditions that causes mental, behavior, and growth problems  · Talk to your healthcare provider before you take any medicines    Many medicines may harm your baby if you take them when you are pregnant  Do not take any medicines, vitamins, herbs, or supplements without first talking to your healthcare provider  Never use illegal or street drugs (such as marijuana or cocaine) while you are pregnant  · Talk to your healthcare provider before you travel  You may not be able to travel in an airplane after 36 weeks  He may also recommend that you avoid long road trips  What are some safety tips during pregnancy? · Avoid hot tubs and saunas  Do not use a hot tub or sauna while you are pregnant, especially during your first trimester  Hot tubs and saunas may raise your baby's temperature and increase the risk of birth defects  · Avoid toxoplasmosis  This is an infection caused by eating raw meat or being around infected cat feces  It can cause birth defects, miscarriages, and other problems  Wash your hands after you touch raw meat  Make sure any meat is well-cooked before you eat it  Avoid raw eggs and unpasteurized milk  Use gloves or ask someone else to clean your cat's litter box while you are pregnant  · Ask your healthcare provider about travel  The most comfortable time to travel is during the second trimester  Ask your healthcare provider if you can travel after 36 weeks  You may not be able to travel in an airplane after 36 weeks  He may also recommend that you avoid long road trips  What changes are happening with my baby? By 38 weeks, your baby may weigh between 6 and 9 pounds  Your baby may be about 14 inches long from the top of the head to the rump (baby's bottom)  Your baby hears well enough to know your voice  As your baby gets larger, you may feel fewer kicks and more stretching and rolling  Your baby may move into a head-down position  Your baby will also rest lower in your abdomen  What do I need to know about prenatal care? Your healthcare provider will check your blood pressure and weight   You may also need the following:  · A urine test  may also be done to check for sugar and protein  These can be signs of gestational diabetes or infection  Protein in your urine may also be a sign of preeclampsia  Preeclampsia is a condition that can develop during week 20 or later of your pregnancy  It causes high blood pressure, and it can cause problems with your kidneys and other organs  · A blood test  may be done to check for anemia (low iron level)  · A Tdap vaccine  may be recommended by your healthcare provider  · A group B strep test  is a test that is done to check for group B strep infection  Group B strep is a type of bacteria that may be found in the vagina or rectum  It can be passed to your baby during delivery if you have it  Your healthcare provider will take swab your vagina or rectum and send the sample to the lab for tests  · Fundal height  is a measurement of your uterus to check your baby's growth  This number is usually the same as the number of weeks that you have been pregnant  Your healthcare provider may also check your baby's position  · Your baby's heart rate  will be checked  When should I seek immediate care? · You develop a severe headache that does not go away  · You have new or increased vision changes, such as blurred or spotted vision  · You have new or increased swelling in your face or hands  · You have vaginal spotting or bleeding  · Your water broke or you feel warm water gushing or trickling from your vagina  When should I contact my healthcare provider? · You have more than 5 contractions in 1 hour  · You notice any changes in your baby's movements  · You have abdominal cramps, pressure, or tightening  · You have a change in vaginal discharge  · You have chills or a fever  · You have vaginal itching, burning, or pain  · You have yellow, green, white, or foul-smelling vaginal discharge      · You have pain or burning when you urinate, less urine than usual, or pink or bloody urine  · You have questions or concerns about your condition or care  CARE AGREEMENT:   You have the right to help plan your care  Learn about your health condition and how it may be treated  Discuss treatment options with your healthcare providers to decide what care you want to receive  You always have the right to refuse treatment  The above information is an  only  It is not intended as medical advice for individual conditions or treatments  Talk to your doctor, nurse or pharmacist before following any medical regimen to see if it is safe and effective for you  © Copyright [address] [place] [address] Information is for End User's use only and may not be sold, redistributed or otherwise used for commercial purposes   All illustrations and images included in CareNotes® are the copyrighted property of A D A M [employer]  or [address] [place] [address]

## 2021-04-19 NOTE — PROGRESS NOTES
Problem List Items Addressed This Visit        Other    Pregnancy, obstetrical care, third trimester - Primary     37 weeks    Heparin dosing finally in place, BID  Discussed last dose AM prior to surgery  Signs of labor and fetal kick counts reviewed            Relevant Orders    POCT urine dip (Completed)

## 2021-04-20 ENCOUNTER — OFFICE VISIT (OUTPATIENT)
Dept: PHYSICAL THERAPY | Facility: CLINIC | Age: 46
End: 2021-04-20
Payer: COMMERCIAL

## 2021-04-20 ENCOUNTER — TELEPHONE (OUTPATIENT)
Dept: OTHER | Facility: OTHER | Age: 46
End: 2021-04-20

## 2021-04-20 DIAGNOSIS — S82.852D CLOSED DISPLACED TRIMALLEOLAR FRACTURE OF LEFT ANKLE WITH ROUTINE HEALING, SUBSEQUENT ENCOUNTER: Primary | ICD-10-CM

## 2021-04-20 PROCEDURE — 97016 VASOPNEUMATIC DEVICE THERAPY: CPT

## 2021-04-20 PROCEDURE — 97112 NEUROMUSCULAR REEDUCATION: CPT

## 2021-04-20 PROCEDURE — 97110 THERAPEUTIC EXERCISES: CPT

## 2021-04-20 NOTE — PROGRESS NOTES
Daily Note     Today's date: 2021  Patient name: Marcel Javed  : 1975  MRN: [id number]  Referring provider: Francy Schwab MD  Dx:   Encounter Diagnosis     ICD-10-CM    1  Closed displaced trimalleolar fracture of left ankle with routine healing, subsequent encounter  [id number]                   Subjective: Pt states she is feeling minimal pain in L ankle  Patient states she is now [facility] in CAM boot as per MD visit on   Objective: See treatment diary below      Assessment:  Pt ambulating with RW and CAM boot today, however pt appears to be WB more than 50% on LLE  Biodex utilized to educate patient on appropriate WB status, pt demonstrated understanding following this  Pt does present with PROM EV limitations and decreased proprioception during NRE  L ankle figure 8 girth pre GameReady: 50 7 cm ; post GameReady: 50 7 cm      Plan: Continue per plan of care        Precautions: Pregnant, NWB      Manuals           L ankle PROM  8' 10'                                                 Neuro Re-Ed             BAPS  20x ea x20 ea          Rockerboard  20x ea x20 ea          Biodex   WB 10"x10                                                              Ther Ex             Ankle 4-way HEP            Seated heel slides  10"x10 10"x10          Toe crunches  20x x20          Seated HR/TR  20x ea x20 ea          Spider walks  20x YTB x20                                                 Ther Activity                                       Gait Training                                       Modalities             Game Ready 10' 10' 10'

## 2021-04-21 ENCOUNTER — ANESTHESIA (INPATIENT)
Dept: LABOR AND DELIVERY | Facility: HOSPITAL | Age: 46
End: 2021-04-21
Payer: COMMERCIAL

## 2021-04-21 ENCOUNTER — ANESTHESIA EVENT (INPATIENT)
Dept: LABOR AND DELIVERY | Facility: HOSPITAL | Age: 46
End: 2021-04-21
Payer: COMMERCIAL

## 2021-04-21 ENCOUNTER — HOSPITAL ENCOUNTER (INPATIENT)
Facility: HOSPITAL | Age: 46
LOS: 3 days | Discharge: HOME | End: 2021-04-24
Attending: OBSTETRICS & GYNECOLOGY | Admitting: STUDENT IN AN ORGANIZED HEALTH CARE EDUCATION/TRAINING PROGRAM
Payer: COMMERCIAL

## 2021-04-21 ENCOUNTER — TELEPHONE (OUTPATIENT)
Dept: OBGYN CLINIC | Facility: CLINIC | Age: 46
End: 2021-04-21

## 2021-04-21 DIAGNOSIS — Z74.09 IMMOBILITY: ICD-10-CM

## 2021-04-21 DIAGNOSIS — Z98.890 HISTORY OF ORTHOPEDIC SURGERY: ICD-10-CM

## 2021-04-21 DIAGNOSIS — O24.414 INSULIN CONTROLLED WHITE CLASSIFICATION A2 GESTATIONAL DIABETES MELLITUS (GDM): ICD-10-CM

## 2021-04-21 DIAGNOSIS — Z3A.35 35 WEEKS GESTATION OF PREGNANCY: ICD-10-CM

## 2021-04-21 DIAGNOSIS — O34.219 PREVIOUS CESAREAN SECTION COMPLICATING PREGNANCY: ICD-10-CM

## 2021-04-21 DIAGNOSIS — Z98.891 S/P REPEAT LOW TRANSVERSE C-SECTION: ICD-10-CM

## 2021-04-21 DIAGNOSIS — O24.419 GDM, CLASS A2: ICD-10-CM

## 2021-04-21 DIAGNOSIS — O34.219 HISTORY OF CESAREAN DELIVERY AFFECTING PREGNANCY: Primary | ICD-10-CM

## 2021-04-21 PROBLEM — Z3A.38 38 WEEKS GESTATION OF PREGNANCY: Status: ACTIVE | Noted: 2021-04-21

## 2021-04-21 LAB
ABO GROUP BLD: NORMAL
ALBUMIN SERPL BCP-MCNC: 2.5 G/DL (ref 3.5–5)
ALP SERPL-CCNC: 238 U/L (ref 46–116)
ALT SERPL W P-5'-P-CCNC: 27 U/L (ref 12–78)
ANION GAP SERPL CALCULATED.3IONS-SCNC: 12 MMOL/L (ref 4–13)
AST SERPL W P-5'-P-CCNC: 22 U/L (ref 5–45)
BACTERIA UR QL AUTO: ABNORMAL /HPF
BASE EXCESS BLDCOA CALC-SCNC: -1.3 MMOL/L (ref 3–11)
BASE EXCESS BLDCOV CALC-SCNC: -3.1 MMOL/L (ref 1–9)
BILIRUB SERPL-MCNC: 0.19 MG/DL (ref 0.2–1)
BILIRUB UR QL STRIP: NEGATIVE
BLD GP AB SCN SERPL QL: NEGATIVE
BUN SERPL-MCNC: 11 MG/DL (ref 5–25)
CALCIUM ALBUM COR SERPL-MCNC: 10.8 MG/DL (ref 8.3–10.1)
CALCIUM SERPL-MCNC: 9.6 MG/DL (ref 8.3–10.1)
CHLORIDE SERPL-SCNC: 103 MMOL/L (ref 100–108)
CLARITY UR: CLEAR
CO2 SERPL-SCNC: 22 MMOL/L (ref 21–32)
COLOR UR: ABNORMAL
CREAT SERPL-MCNC: 0.55 MG/DL (ref 0.6–1.3)
CREAT UR-MCNC: 42 MG/DL
ERYTHROCYTE [DISTWIDTH] IN BLOOD BY AUTOMATED COUNT: 16.2 % (ref 11.6–15.1)
GFR SERPL CREATININE-BSD FRML MDRD: 114 ML/MIN/1.73SQ M
GLUCOSE SERPL-MCNC: 44 MG/DL (ref 65–140)
GLUCOSE SERPL-MCNC: 46 MG/DL (ref 65–140)
GLUCOSE SERPL-MCNC: 54 MG/DL (ref 65–140)
GLUCOSE SERPL-MCNC: 68 MG/DL (ref 65–140)
GLUCOSE SERPL-MCNC: 83 MG/DL (ref 65–140)
GLUCOSE UR STRIP-MCNC: NEGATIVE MG/DL
HCO3 BLDCOA-SCNC: 28.2 MMOL/L (ref 17.3–27.3)
HCO3 BLDCOV-SCNC: 23.2 MMOL/L (ref 12.2–28.6)
HCT VFR BLD AUTO: 40.9 % (ref 34.8–46.1)
HGB BLD-MCNC: 13.4 G/DL (ref 11.5–15.4)
HGB UR QL STRIP.AUTO: NEGATIVE
KETONES UR STRIP-MCNC: NEGATIVE MG/DL
LEUKOCYTE ESTERASE UR QL STRIP: NEGATIVE
MCH RBC QN AUTO: 29.1 PG (ref 26.8–34.3)
MCHC RBC AUTO-ENTMCNC: 32.8 G/DL (ref 31.4–37.4)
MCV RBC AUTO: 89 FL (ref 82–98)
NITRITE UR QL STRIP: NEGATIVE
NON-SQ EPI CELLS URNS QL MICRO: ABNORMAL /HPF
OXYHGB MFR BLDCOA: 12.1 %
OXYHGB MFR BLDCOV: 66.3 %
PCO2 BLDCOA: 69.1 MM[HG] (ref 30–60)
PCO2 BLDCOV: 45.8 MM HG (ref 27–43)
PH BLDCOA: 7.23 [PH] (ref 7.23–7.43)
PH BLDCOV: 7.32 [PH] (ref 7.19–7.49)
PH UR STRIP.AUTO: 6.5 [PH]
PLATELET # BLD AUTO: 182 THOUSANDS/UL (ref 149–390)
PMV BLD AUTO: 12.9 FL (ref 8.9–12.7)
PO2 BLDCOA: <10 MM HG (ref 5–25)
PO2 BLDCOV: 27.5 MM HG (ref 15–45)
POTASSIUM SERPL-SCNC: 3.8 MMOL/L (ref 3.5–5.3)
PROT SERPL-MCNC: 7.3 G/DL (ref 6.4–8.2)
PROT UR STRIP-MCNC: NEGATIVE MG/DL
PROT UR-MCNC: 6 MG/DL
PROT/CREAT UR: 0.14 MG/G{CREAT} (ref 0–0.1)
RBC # BLD AUTO: 4.61 MILLION/UL (ref 3.81–5.12)
RBC #/AREA URNS AUTO: ABNORMAL /HPF
RH BLD: POSITIVE
SAO2 % BLDCOV: 14.6 ML/DL
SODIUM SERPL-SCNC: 137 MMOL/L (ref 136–145)
SP GR UR STRIP.AUTO: 1.01 (ref 1–1.03)
SPECIMEN EXPIRATION DATE: NORMAL
UROBILINOGEN UR QL STRIP.AUTO: 0.2 E.U./DL
WBC # BLD AUTO: 10.49 THOUSAND/UL (ref 4.31–10.16)
WBC #/AREA URNS AUTO: ABNORMAL /HPF

## 2021-04-21 PROCEDURE — NC001 PR NO CHARGE: Performed by: OBSTETRICS & GYNECOLOGY

## 2021-04-21 PROCEDURE — 59510 CESAREAN DELIVERY: CPT | Performed by: STUDENT IN AN ORGANIZED HEALTH CARE EDUCATION/TRAINING PROGRAM

## 2021-04-21 PROCEDURE — 80053 COMPREHEN METABOLIC PANEL: CPT | Performed by: OBSTETRICS & GYNECOLOGY

## 2021-04-21 PROCEDURE — 88307 TISSUE EXAM BY PATHOLOGIST: CPT | Performed by: PATHOLOGY

## 2021-04-21 PROCEDURE — 86592 SYPHILIS TEST NON-TREP QUAL: CPT | Performed by: OBSTETRICS & GYNECOLOGY

## 2021-04-21 PROCEDURE — 86850 RBC ANTIBODY SCREEN: CPT | Performed by: OBSTETRICS & GYNECOLOGY

## 2021-04-21 PROCEDURE — 82805 BLOOD GASES W/O2 SATURATION: CPT | Performed by: STUDENT IN AN ORGANIZED HEALTH CARE EDUCATION/TRAINING PROGRAM

## 2021-04-21 PROCEDURE — 3E033VJ INTRODUCTION OF OTHER HORMONE INTO PERIPHERAL VEIN, PERCUTANEOUS APPROACH: ICD-10-PCS | Performed by: STUDENT IN AN ORGANIZED HEALTH CARE EDUCATION/TRAINING PROGRAM

## 2021-04-21 PROCEDURE — 82570 ASSAY OF URINE CREATININE: CPT | Performed by: OBSTETRICS & GYNECOLOGY

## 2021-04-21 PROCEDURE — 86900 BLOOD TYPING SEROLOGIC ABO: CPT | Performed by: OBSTETRICS & GYNECOLOGY

## 2021-04-21 PROCEDURE — 86901 BLOOD TYPING SEROLOGIC RH(D): CPT | Performed by: OBSTETRICS & GYNECOLOGY

## 2021-04-21 PROCEDURE — 85027 COMPLETE CBC AUTOMATED: CPT | Performed by: OBSTETRICS & GYNECOLOGY

## 2021-04-21 PROCEDURE — 84156 ASSAY OF PROTEIN URINE: CPT | Performed by: OBSTETRICS & GYNECOLOGY

## 2021-04-21 PROCEDURE — 82948 REAGENT STRIP/BLOOD GLUCOSE: CPT

## 2021-04-21 PROCEDURE — 99214 OFFICE O/P EST MOD 30 MIN: CPT

## 2021-04-21 PROCEDURE — 81001 URINALYSIS AUTO W/SCOPE: CPT | Performed by: OBSTETRICS & GYNECOLOGY

## 2021-04-21 PROCEDURE — 4A1HXCZ MONITORING OF PRODUCTS OF CONCEPTION, CARDIAC RATE, EXTERNAL APPROACH: ICD-10-PCS | Performed by: STUDENT IN AN ORGANIZED HEALTH CARE EDUCATION/TRAINING PROGRAM

## 2021-04-21 PROCEDURE — NC001 PR NO CHARGE: Performed by: STUDENT IN AN ORGANIZED HEALTH CARE EDUCATION/TRAINING PROGRAM

## 2021-04-21 RX ORDER — OXYTOCIN/RINGER'S LACTATE 30/500 ML
62.5 PLASTIC BAG, INJECTION (ML) INTRAVENOUS CONTINUOUS
Status: ACTIVE | OUTPATIENT
Start: 2021-04-21 | End: 2021-04-22

## 2021-04-21 RX ORDER — DEXTROSE, SODIUM CHLORIDE, SODIUM LACTATE, POTASSIUM CHLORIDE, AND CALCIUM CHLORIDE 5; .6; .31; .03; .02 G/100ML; G/100ML; G/100ML; G/100ML; G/100ML
125 INJECTION, SOLUTION INTRAVENOUS CONTINUOUS
Status: DISCONTINUED | OUTPATIENT
Start: 2021-04-21 | End: 2021-04-21

## 2021-04-21 RX ORDER — HYDROMORPHONE HCL/PF 1 MG/ML
0.2 SYRINGE (ML) INJECTION
Status: DISCONTINUED | OUTPATIENT
Start: 2021-04-21 | End: 2021-04-24 | Stop reason: HOSPADM

## 2021-04-21 RX ORDER — FENTANYL CITRATE 50 UG/ML
INJECTION, SOLUTION INTRAMUSCULAR; INTRAVENOUS AS NEEDED
Status: DISCONTINUED | OUTPATIENT
Start: 2021-04-21 | End: 2021-04-21

## 2021-04-21 RX ORDER — NALBUPHINE HCL 10 MG/ML
5 AMPUL (ML) INJECTION EVERY 6 HOURS PRN
Status: DISCONTINUED | OUTPATIENT
Start: 2021-04-21 | End: 2021-04-24 | Stop reason: HOSPADM

## 2021-04-21 RX ORDER — SODIUM CHLORIDE, SODIUM LACTATE, POTASSIUM CHLORIDE, CALCIUM CHLORIDE 600; 310; 30; 20 MG/100ML; MG/100ML; MG/100ML; MG/100ML
125 INJECTION, SOLUTION INTRAVENOUS CONTINUOUS
Status: DISCONTINUED | OUTPATIENT
Start: 2021-04-21 | End: 2021-04-24 | Stop reason: HOSPADM

## 2021-04-21 RX ORDER — CITALOPRAM 20 MG/1
40 TABLET ORAL
Status: DISCONTINUED | OUTPATIENT
Start: 2021-04-21 | End: 2021-04-24 | Stop reason: HOSPADM

## 2021-04-21 RX ORDER — OXYCODONE HYDROCHLORIDE 10 MG/1
10 TABLET ORAL EVERY 4 HOURS PRN
Status: DISCONTINUED | OUTPATIENT
Start: 2021-04-22 | End: 2021-04-24 | Stop reason: HOSPADM

## 2021-04-21 RX ORDER — DIPHENHYDRAMINE HCL 25 MG
25 TABLET ORAL EVERY 6 HOURS PRN
Status: DISCONTINUED | OUTPATIENT
Start: 2021-04-22 | End: 2021-04-24 | Stop reason: HOSPADM

## 2021-04-21 RX ORDER — KETOROLAC TROMETHAMINE 30 MG/ML
30 INJECTION, SOLUTION INTRAMUSCULAR; INTRAVENOUS EVERY 6 HOURS
Status: DISPENSED | OUTPATIENT
Start: 2021-04-22 | End: 2021-04-23

## 2021-04-21 RX ORDER — SODIUM CHLORIDE 9 MG/ML
125 INJECTION, SOLUTION INTRAVENOUS CONTINUOUS
Status: DISCONTINUED | OUTPATIENT
Start: 2021-04-21 | End: 2021-04-24 | Stop reason: HOSPADM

## 2021-04-21 RX ORDER — DEXAMETHASONE SODIUM PHOSPHATE 4 MG/ML
8 INJECTION, SOLUTION INTRA-ARTICULAR; INTRALESIONAL; INTRAMUSCULAR; INTRAVENOUS; SOFT TISSUE ONCE AS NEEDED
Status: ACTIVE | OUTPATIENT
Start: 2021-04-21 | End: 2021-04-22

## 2021-04-21 RX ORDER — OXYTOCIN/RINGER'S LACTATE 30/500 ML
PLASTIC BAG, INJECTION (ML) INTRAVENOUS CONTINUOUS PRN
Status: DISCONTINUED | OUTPATIENT
Start: 2021-04-21 | End: 2021-04-21

## 2021-04-21 RX ORDER — MORPHINE SULFATE 0.5 MG/ML
INJECTION, SOLUTION EPIDURAL; INTRATHECAL; INTRAVENOUS AS NEEDED
Status: DISCONTINUED | OUTPATIENT
Start: 2021-04-21 | End: 2021-04-21

## 2021-04-21 RX ORDER — BUPROPION HYDROCHLORIDE 150 MG/1
150 TABLET ORAL
Status: DISCONTINUED | OUTPATIENT
Start: 2021-04-21 | End: 2021-04-24 | Stop reason: HOSPADM

## 2021-04-21 RX ORDER — DEXAMETHASONE SODIUM PHOSPHATE 4 MG/ML
INJECTION, SOLUTION INTRA-ARTICULAR; INTRALESIONAL; INTRAMUSCULAR; INTRAVENOUS; SOFT TISSUE AS NEEDED
Status: DISCONTINUED | OUTPATIENT
Start: 2021-04-21 | End: 2021-04-21

## 2021-04-21 RX ORDER — LIDOCAINE HYDROCHLORIDE 10 MG/ML
INJECTION, SOLUTION EPIDURAL; INFILTRATION; INTRACAUDAL; PERINEURAL AS NEEDED
Status: DISCONTINUED | OUTPATIENT
Start: 2021-04-21 | End: 2021-04-21

## 2021-04-21 RX ORDER — NALOXONE HYDROCHLORIDE 0.4 MG/ML
0.1 INJECTION, SOLUTION INTRAMUSCULAR; INTRAVENOUS; SUBCUTANEOUS
Status: ACTIVE | OUTPATIENT
Start: 2021-04-21 | End: 2021-04-22

## 2021-04-21 RX ORDER — CEFAZOLIN SODIUM 2 G/50ML
2000 SOLUTION INTRAVENOUS ONCE
Status: COMPLETED | OUTPATIENT
Start: 2021-04-21 | End: 2021-04-21

## 2021-04-21 RX ORDER — KETOROLAC TROMETHAMINE 30 MG/ML
INJECTION, SOLUTION INTRAMUSCULAR; INTRAVENOUS AS NEEDED
Status: DISCONTINUED | OUTPATIENT
Start: 2021-04-21 | End: 2021-04-21

## 2021-04-21 RX ORDER — ONDANSETRON 2 MG/ML
INJECTION INTRAMUSCULAR; INTRAVENOUS AS NEEDED
Status: DISCONTINUED | OUTPATIENT
Start: 2021-04-21 | End: 2021-04-21

## 2021-04-21 RX ORDER — METOCLOPRAMIDE HYDROCHLORIDE 5 MG/ML
5 INJECTION INTRAMUSCULAR; INTRAVENOUS EVERY 6 HOURS PRN
Status: ACTIVE | OUTPATIENT
Start: 2021-04-21 | End: 2021-04-22

## 2021-04-21 RX ORDER — ONDANSETRON 2 MG/ML
4 INJECTION INTRAMUSCULAR; INTRAVENOUS EVERY 4 HOURS PRN
Status: ACTIVE | OUTPATIENT
Start: 2021-04-21 | End: 2021-04-22

## 2021-04-21 RX ORDER — SIMETHICONE 80 MG
80 TABLET,CHEWABLE ORAL 4 TIMES DAILY PRN
Status: DISCONTINUED | OUTPATIENT
Start: 2021-04-21 | End: 2021-04-24 | Stop reason: HOSPADM

## 2021-04-21 RX ORDER — BUPROPION HYDROCHLORIDE 150 MG/1
300 TABLET ORAL
Status: DISCONTINUED | OUTPATIENT
Start: 2021-04-21 | End: 2021-04-24 | Stop reason: HOSPADM

## 2021-04-21 RX ORDER — DIAPER,BRIEF,INFANT-TODD,DISP
1 EACH MISCELLANEOUS DAILY PRN
Status: DISCONTINUED | OUTPATIENT
Start: 2021-04-21 | End: 2021-04-24 | Stop reason: HOSPADM

## 2021-04-21 RX ORDER — FENTANYL CITRATE/PF 50 MCG/ML
25 SYRINGE (ML) INJECTION
Status: DISCONTINUED | OUTPATIENT
Start: 2021-04-21 | End: 2021-04-24 | Stop reason: HOSPADM

## 2021-04-21 RX ORDER — DOCUSATE SODIUM 100 MG/1
100 CAPSULE, LIQUID FILLED ORAL 2 TIMES DAILY
Status: DISCONTINUED | OUTPATIENT
Start: 2021-04-22 | End: 2021-04-24 | Stop reason: HOSPADM

## 2021-04-21 RX ORDER — SODIUM CHLORIDE, SODIUM LACTATE, POTASSIUM CHLORIDE, CALCIUM CHLORIDE 600; 310; 30; 20 MG/100ML; MG/100ML; MG/100ML; MG/100ML
INJECTION, SOLUTION INTRAVENOUS CONTINUOUS PRN
Status: DISCONTINUED | OUTPATIENT
Start: 2021-04-21 | End: 2021-04-21

## 2021-04-21 RX ORDER — CALCIUM CARBONATE 200(500)MG
1000 TABLET,CHEWABLE ORAL DAILY PRN
Status: DISCONTINUED | OUTPATIENT
Start: 2021-04-21 | End: 2021-04-24 | Stop reason: HOSPADM

## 2021-04-21 RX ORDER — BUPIVACAINE HYDROCHLORIDE 7.5 MG/ML
INJECTION, SOLUTION INTRASPINAL AS NEEDED
Status: DISCONTINUED | OUTPATIENT
Start: 2021-04-21 | End: 2021-04-21

## 2021-04-21 RX ORDER — IBUPROFEN 600 MG/1
600 TABLET ORAL EVERY 6 HOURS PRN
Status: DISCONTINUED | OUTPATIENT
Start: 2021-04-23 | End: 2021-04-24 | Stop reason: HOSPADM

## 2021-04-21 RX ORDER — HYDROMORPHONE HCL/PF 1 MG/ML
0.5 SYRINGE (ML) INJECTION EVERY 2 HOUR PRN
Status: DISCONTINUED | OUTPATIENT
Start: 2021-04-21 | End: 2021-04-24 | Stop reason: HOSPADM

## 2021-04-21 RX ORDER — SODIUM CHLORIDE 9 MG/ML
INJECTION, SOLUTION INTRAVENOUS CONTINUOUS PRN
Status: DISCONTINUED | OUTPATIENT
Start: 2021-04-21 | End: 2021-04-21

## 2021-04-21 RX ORDER — ACETAMINOPHEN 325 MG/1
650 TABLET ORAL EVERY 6 HOURS
Status: DISPENSED | OUTPATIENT
Start: 2021-04-22 | End: 2021-04-23

## 2021-04-21 RX ORDER — ONDANSETRON 2 MG/ML
4 INJECTION INTRAMUSCULAR; INTRAVENOUS EVERY 8 HOURS PRN
Status: DISCONTINUED | OUTPATIENT
Start: 2021-04-21 | End: 2021-04-21

## 2021-04-21 RX ORDER — ACETAMINOPHEN 325 MG/1
650 TABLET ORAL EVERY 6 HOURS PRN
Status: DISCONTINUED | OUTPATIENT
Start: 2021-04-22 | End: 2021-04-24 | Stop reason: HOSPADM

## 2021-04-21 RX ORDER — OXYCODONE HYDROCHLORIDE 5 MG/1
5 TABLET ORAL EVERY 4 HOURS PRN
Status: DISCONTINUED | OUTPATIENT
Start: 2021-04-22 | End: 2021-04-24 | Stop reason: HOSPADM

## 2021-04-21 RX ADMIN — Medication 500 MG: at 19:46

## 2021-04-21 RX ADMIN — SODIUM CHLORIDE 1000 ML: 0.9 INJECTION, SOLUTION INTRAVENOUS at 18:48

## 2021-04-21 RX ADMIN — Medication 999 MILLI-UNITS/MIN: at 20:15

## 2021-04-21 RX ADMIN — KETOROLAC TROMETHAMINE 30 MG: 30 INJECTION, SOLUTION INTRAMUSCULAR at 20:56

## 2021-04-21 RX ADMIN — DEXTROSE, SODIUM CHLORIDE, SODIUM LACTATE, POTASSIUM CHLORIDE, AND CALCIUM CHLORIDE 125 ML/HR: 5; .6; .31; .03; .02 INJECTION, SOLUTION INTRAVENOUS at 17:53

## 2021-04-21 RX ADMIN — SODIUM CHLORIDE, SODIUM LACTATE, POTASSIUM CHLORIDE, AND CALCIUM CHLORIDE 125 ML/HR: .6; .31; .03; .02 INJECTION, SOLUTION INTRAVENOUS at 21:20

## 2021-04-21 RX ADMIN — PHENYLEPHRINE HYDROCHLORIDE 50 MCG/MIN: 10 INJECTION INTRAVENOUS at 19:45

## 2021-04-21 RX ADMIN — CEFAZOLIN SODIUM 2000 MG: 2 SOLUTION INTRAVENOUS at 19:41

## 2021-04-21 RX ADMIN — SODIUM CHLORIDE, SODIUM LACTATE, POTASSIUM CHLORIDE, AND CALCIUM CHLORIDE: .6; .31; .03; .02 INJECTION, SOLUTION INTRAVENOUS at 19:47

## 2021-04-21 RX ADMIN — BUPIVACAINE HYDROCHLORIDE IN DEXTROSE 1.5 ML: 7.5 INJECTION, SOLUTION SUBARACHNOID at 19:44

## 2021-04-21 RX ADMIN — DEXAMETHASONE SODIUM PHOSPHATE 4 MG: 4 INJECTION INTRA-ARTICULAR; INTRALESIONAL; INTRAMUSCULAR; INTRAVENOUS; SOFT TISSUE at 19:51

## 2021-04-21 RX ADMIN — FENTANYL CITRATE 15 MCG: 50 INJECTION, SOLUTION INTRAMUSCULAR; INTRAVENOUS at 19:44

## 2021-04-21 RX ADMIN — SODIUM CHLORIDE, SODIUM LACTATE, POTASSIUM CHLORIDE, AND CALCIUM CHLORIDE: .6; .31; .03; .02 INJECTION, SOLUTION INTRAVENOUS at 21:13

## 2021-04-21 RX ADMIN — Medication 62.5 MILLI-UNITS/MIN: at 21:50

## 2021-04-21 RX ADMIN — ONDANSETRON 4 MG: 2 INJECTION INTRAMUSCULAR; INTRAVENOUS at 19:42

## 2021-04-21 RX ADMIN — MORPHINE SULFATE 0.15 MG: 0.5 INJECTION, SOLUTION EPIDURAL; INTRATHECAL; INTRAVENOUS at 19:44

## 2021-04-21 RX ADMIN — LIDOCAINE HYDROCHLORIDE 3 ML: 10 INJECTION, SOLUTION EPIDURAL; INFILTRATION; INTRACAUDAL; PERINEURAL at 19:42

## 2021-04-21 NOTE — PLAN OF CARE
Problem: Potential for Falls  Goal: Patient will remain free of falls  Description: INTERVENTIONS:  - Assess patient frequently for physical needs  -  Identify cognitive and physical deficits and behaviors that affect risk of falls    -  [place] fall precautions as indicated by assessment   - Educate patient/family on patient safety including physical limitations  - Instruct patient to call for assistance with activity based on assessment  - Modify environment to reduce risk of injury  - Consider OT/PT consult to assist with strengthening/mobility  Outcome: Progressing     Problem: PAIN - ADULT  Goal: Verbalizes/displays adequate comfort level or baseline comfort level  Description: Interventions:  - Encourage patient to monitor pain and request assistance  - Assess pain using appropriate pain scale  - Administer analgesics based on type and severity of pain and evaluate response  - Implement non-pharmacological measures as appropriate and evaluate response  - Consider cultural and social influences on pain and pain management  - Notify physician/advanced practitioner if interventions unsuccessful or patient reports new pain  Outcome: Progressing     Problem: INFECTION - ADULT  Goal: Absence or prevention of progression during hospitalization  Description: INTERVENTIONS:  - Assess and monitor for signs and symptoms of infection  - Monitor lab/diagnostic results  - Monitor all insertion sites, i e  indwelling lines, tubes, and drains  - Monitor endotracheal if appropriate and nasal secretions for changes in amount and color  - [place] appropriate cooling/warming therapies per order  - Administer medications as ordered  - Instruct and encourage patient and family to use good hand hygiene technique  - Identify and instruct in appropriate isolation precautions for identified infection/condition  Outcome: Progressing  Goal: Absence of fever/infection during neutropenic period  Description: INTERVENTIONS:  - Monitor WBC    Outcome: Progressing     Problem: SAFETY ADULT  Goal: Patient will remain free of falls  Description: INTERVENTIONS:  - Assess patient frequently for physical needs  -  Identify cognitive and physical deficits and behaviors that affect risk of falls    -  [place] fall precautions as indicated by assessment   - Educate patient/family on patient safety including physical limitations  - Instruct patient to call for assistance with activity based on assessment  - Modify environment to reduce risk of injury  - Consider OT/PT consult to assist with strengthening/mobility  Outcome: Progressing  Goal: Maintain or return to baseline ADL function  Description: INTERVENTIONS:  -  Assess patient's ability to carry out ADLs; assess patient's baseline for ADL function and identify physical deficits which impact ability to perform ADLs (bathing, care of mouth/teeth, toileting, grooming, dressing, etc )  - Assess/evaluate cause of self-care deficits   - Assess range of motion  - Assess patient's mobility; develop plan if impaired  - Assess patient's need for assistive devices and provide as appropriate  - Encourage maximum independence but intervene and supervise when necessary  - Involve family in performance of ADLs  - Assess for home care needs following discharge   - Consider OT consult to assist with ADL evaluation and planning for discharge  - Provide patient education as appropriate  Outcome: Progressing  Goal: Maintain or return mobility status to optimal level  Description: INTERVENTIONS:  - Assess patient's baseline mobility status (ambulation, transfers, stairs, etc )    - Identify cognitive and physical deficits and behaviors that affect mobility  - Identify mobility aids required to assist with transfers and/or ambulation (gait belt, sit-to-stand, lift, walker, cane, etc )  - [place] fall precautions as indicated by assessment  - Record patient progress and toleration of activity level on Mobility SBAR; progress patient to next Phase/Stage  - Instruct patient to call for assistance with activity based on assessment  - Consider rehabilitation consult to assist with strengthening/weightbearing, etc   Outcome: Progressing     Problem: Knowledge Deficit  Goal: Patient/family/caregiver demonstrates understanding of disease process, treatment plan, medications, and discharge instructions  Description: Complete learning assessment and assess knowledge base    Interventions:  - Provide teaching at level of understanding  - Provide teaching via preferred learning methods  Outcome: Progressing     Problem: DISCHARGE PLANNING  Goal: Discharge to home or other facility with appropriate resources  Description: INTERVENTIONS:  - Identify barriers to discharge w/patient and caregiver  - Arrange for needed discharge resources and transportation as appropriate  - Identify discharge learning needs (meds, wound care, etc )  - Arrange for interpretive services to assist at discharge as needed  - Refer to Case Management Department for coordinating discharge planning if the patient needs post-hospital services based on physician/advanced practitioner order or complex needs related to functional status, cognitive ability, or social support system  Outcome: Progressing     Problem: BIRTH - VAGINAL/ SECTION  Goal: Fetal and maternal status remain reassuring during the birth process  Description: INTERVENTIONS:  - Monitor vital signs  - Monitor fetal heart rate  - Monitor uterine activity  - Monitor labor progression (vaginal delivery)  - DVT prophylaxis  - Antibiotic prophylaxis  Outcome: Progressing  Goal: Emotionally satisfying birthing experience for mother/fetus  Description: Interventions:  - Assess, plan, implement and evaluate the nursing care given to the patient in labor  - Advocate the philosophy that each childbirth experience is a unique experience and support the family's chosen level of involvement and control during the labor process   - Actively participate in both the patient's and family's teaching of the birth process  - Consider cultural, Oriental orthodox and age-specific factors and plan care for the patient in labor  Outcome: Progressing

## 2021-04-21 NOTE — TELEPHONE ENCOUNTER
Larry Text:   #: [phone] / pt Stacykirill Deadele /  1975 / pt stated tomorrow she is 39 wks and she thinks she lost her mucus plug  She is not sure what to do

## 2021-04-21 NOTE — TELEPHONE ENCOUNTER
, 38w0d, Pt called in stating she lost her mucus plug yesterday and as of 20 mins ago she started to have back pain and leakage of fluids  Pt stated leakage is clear with light pink and she placed a liner 20 mins ago and the leakage went though her liner into her underwear in which she cant control the leak  I advised pt possible PROM and informed her to go to l and d [facility] for evaluation  Pt agreed to plan of action  As she stated she is supposed to have a  as she has never given birth naturally  Larry text on call dr smith and informed l and d  Per tiger tet response from dr smith agrees with plan of action

## 2021-04-21 NOTE — ANESTHESIA PREPROCEDURE EVALUATION
Procedure:   SECTION () REPEAT (N/A Uterus)    Relevant Problems   GYN   (+) Advanced maternal age in multigravida, unspecified trimester      NEURO/PSYCH   (+) Anxiety associated with depression      Other   (+) Closed displaced trimalleolar fracture of left lower leg   (+) History of  delivery affecting pregnancy   (+) Insulin controlled White classification A2 gestational diabetes mellitus (GDM)   (+) Pregnancy, obstetrical care, third trimester      Lab Results   Component Value Date    WBC 10 49 (H) 2021    HGB 13 4 2021    HCT 40 9 2021    MCV 89 2021     2021     Lab Results   Component Value Date    SODIUM 135 (L) 2021    K 3 9 2021     2021    CO2 25 2021    BUN 12 2021    CREATININE 0 54 (L) 2021    GLUC 76 2021    CALCIUM 8 8 2021     No results found for: INR, PROTIME  Lab Results   Component Value Date    HGBA1C 5 4 2021          Physical Exam    Airway    Mallampati score: II  TM Distance: >3 FB  Neck ROM: full     Dental   No notable dental hx     Cardiovascular  Cardiovascular exam normal    Pulmonary  Pulmonary exam normal     Other Findings        Anesthesia Plan  ASA Score- 2     Anesthesia Type- spinal with ASA Monitors  Additional Monitors:   Airway Plan:           Plan Factors-Exercise tolerance (METS): >4 METS  Chart reviewed  Existing labs reviewed  Patient summary reviewed  Induction-     Postoperative Plan-     Informed Consent- Anesthetic plan and risks discussed with patient  I personally reviewed this patient with the CRNA  Discussed and agreed on the Anesthesia Plan with the CRNA  Casa Rogers

## 2021-04-21 NOTE — DISCHARGE SUMMARY
Discharge Summary - OB/GYN   Emil Silva 39 y o  female MRN: [id number]  Unit/Bed#: LD TRIAGE  Encounter: [id number]      Admission Date: 2021     Discharge Date: 21    Admitting Diagnosis:   1  Pregnancy at 38w0d  2  IVF pregnancy  3  AMA  4  A2GDM  5  Prior  section x2  6  ORIF for ankle fracture during this pregnancy  7  Long interval pregnancy      Discharge Diagnosis:   Same, delivered    Procedures:   repeat  section, low transverse incision  Delivery Attending: Karli  Discharge Attending: Dr [facility] Course:     Emil Silva is a 39 y o  [id number] at 38w0d wks who was initially admitted for repeat low transverse  section secondary to rupture of membranes  She delivered a viable male  on 21 at 2013  Weight 6lbs 11 2oz via repeat  section, low transverse incision  Apgars were 7 (1 min) and 8 (5 min)   was transferred to  nursery  Patient tolerated the procedure well and was transferred to recovery in stable condition  Her post-operative course was uncomplicated  Preoperative hemoglobin was 13 4, postoperative was 10 3  Her postoperative pain was well controlled with oral analgesics  On day of discharge, she was ambulating and able to reasonably perform all ADLs  She was voiding and had appropriate bowel function  Pain was well controlled  She was discharged home on post-operative day #3  without complications  Patient was instructed to follow up with her OB as an outpatient and was given appropriate warnings to call provider if she develops signs of infection or uncontrolled pain  Complications: none apparent    Condition at discharge: good     Discharge instructions/Information to patient and family:   See after visit summary for information provided to patient and family        Provisions for Follow-Up Care:  See after visit summary for information related to follow-up care and any pertinent home health orders  Disposition: Home    Planned Readmission: No    Discharge Medications: For a complete list of the patient's medications, please refer to her med rec

## 2021-04-21 NOTE — ANESTHESIA PROCEDURE NOTES
Spinal Block    Patient location during procedure: OR  Start time: 4/21/2021 7:44 PM  Reason for block: primary anesthetic  Staffing  Resident/CRNA: Virgen Lindsey CRNA  Performed: resident/CRNA   Preanesthetic Checklist  Completed: patient identified, surgical consent, pre-op evaluation, timeout performed, IV checked, risks and benefits discussed and monitors and equipment checked  Spinal Block  Patient position: sitting  Prep: ChloraPrep  Patient monitoring: heart rate, continuous pulse ox and frequent blood pressure checks  Approach: midline  Location: L3-4  Injection technique: single-shot  Needle  Needle type: pencil-tip   Needle gauge: 25 G  Needle length: 5 cm  Assessment  Events: cerebrospinal fluid  Injection Assessment:  positive aspiration for clear CSF    Post-procedure:  site cleaned  Additional Notes  1 attempt, 20g introducer

## 2021-04-21 NOTE — H&P
H&P Exam - Obstetrics   Karishma Werner 39 y o  female MRN: [id number]  Unit/Bed#: LD TRIAGE  Encounter: [id number]      History of Present Illness     Chief Complaint: SROM    HPI:  Karishma Werner is a 39 y o  [id number] female with an RUDDY of 2021, by Patient Reported at 38w0d weeks gestation who is being admitted for SROM  Contractions: cramping  Loss of fluid: yes  Vaginal bleeding: spotting  Fetal movement: yes    She is SLOGA patient  PREGNANCY COMPLICATIONS:   1) Prior  section x2  2) Long interval pregnancy  3) A2GDM: Humalog 10u with meals, Lantus 46u qhs  4) ORIF for ankle fracture: on heparin 5000u BID (last dose at 930 this AM)  5) AMA  6) IVF pregnancy    OB History    Para Term  AB Living   7 2 2 0 4 2   SAB TAB Ectopic Multiple Live Births   3 1 0 0 2      # Outcome Date GA Lbr Paul/2nd Weight Sex Delivery Anes PTL Lv   7 Current            6 2019 8w0d          5 TAB  6w0d          4 2011 8w0d          3 Term 08 39w0d  3515 g (7 lb 12 oz) M CS-LTranv   JENNIFER   2 Term 05 38w4d  3771 g (8 lb 5 oz) F CS-LTranv   JENNIFER      Complications: Breech presentation   1 2004 8w0d              Baby complications/comments: None    Review of Systems   Constitutional: Positive for diaphoresis and fatigue  Negative for fever  HENT: Negative for hearing loss, nosebleeds, tinnitus and trouble swallowing  Eyes: Negative for photophobia and visual disturbance  Respiratory: Negative for apnea, shortness of breath and wheezing  Cardiovascular: Negative for chest pain and palpitations  Gastrointestinal: Negative for blood in stool and vomiting  Endocrine: Negative for polydipsia and polyphagia  Genitourinary: Negative for dysuria and hematuria  Musculoskeletal: Negative for arthralgias, gait problem and myalgias  Skin: Negative for color change and pallor     Neurological: Negative for dizziness, seizures, facial asymmetry, speech difficulty, numbness and headaches  Psychiatric/Behavioral: Negative for agitation, behavioral problems and confusion  Historical Information   Past Medical History:   Diagnosis Date    Abnormal Pap smear of cervix 2008    Anemia     Anxiety     Gestational diabetes     HPV (human papilloma virus) infection     Miscarriage     Varicella      Past Surgical History:   Procedure Laterality Date    BUNIONECTOMY Bilateral     BUNIONECTOMY Right 2016     SECTION      [id number]    OTHER SURGICAL HISTORY      IVF egg retrieval     CA OPEN TX TRIMALLEOLAR ANKLE FX W FIX PST LIP Left 3/5/2021    Procedure: OPEN REDUCTION W/ INTERNAL FIXATION (ORIF) ANKLE;  Surgeon: Beatris Coppola MD;  [facility];  Service: Orthopedics     Social History   Social History     Substance and Sexual Activity   Alcohol Use Not Currently    Frequency: Monthly or less    Comment: social     Social History     Substance and Sexual Activity   Drug Use Never     Social History     Tobacco Use   Smoking Status Never Smoker   Smokeless Tobacco Never Used     Family History: non-contributory    Meds/Allergies      Medications Prior to Admission   Medication    aspirin (ECOTRIN LOW STRENGTH) 81 mg EC tablet    Basaglar KwikPen 100 units/mL injection pen    buPROPion (WELLBUTRIN XL) 150 mg 24 hr tablet    buPROPion (WELLBUTRIN XL) 300 mg 24 hr tablet    citalopram (CeleXA) 40 mg tablet    Ferrous Sulfate (IRON PO)    Heparin Sodium, Porcine, (heparin, porcine,) 5,000 units/mL    insulin lispro (HumaLOG KwikPen) 100 units/mL injection pen    Prenatal MV-Min-Fe Fum-FA-DHA (PRENATAL 1 PO)    VITAMIN D PO    Accu-Chek Guide test strip    Accu-Chek Softclix Lancets lancets    Blood Glucose Monitoring Suppl (Accu-Chek Guide Me) w/Device KIT    Insulin Pen Needle 31G X 5 MM MISC      No Known Allergies    OBJECTIVE:    Vitals: Blood pressure 118/84, pulse 71, temperature 98 °F (36 7 °C), temperature source Oral, resp  rate 18, height 5' 3" (1 6 m), last menstrual period 07/28/2020, SpO2 97 %, unknown if currently breastfeeding  Body mass index is 32 59 kg/m²  Physical Exam  Constitutional:       Appearance: She is diaphoretic  HENT:      Head: Normocephalic and atraumatic  Mouth/Throat:      Mouth: Mucous membranes are moist    Eyes:      Pupils: Pupils are equal, round, and reactive to light  Pulmonary:      Effort: Pulmonary effort is normal  No respiratory distress  Abdominal:      General: Bowel sounds are normal  There is no distension  Palpations: Abdomen is soft  Tenderness: There is no abdominal tenderness  There is no guarding  Comments: Gravid   Neurological:      General: No focal deficit present  Mental Status: She is oriented to person, place, and time          Ferning: Deferred, grossly ruptured  Nitrazine: Deferred, grossly ruptured    Cervix: 1/50/-3       Fetal heart rate:   Baseline Rate: 130 bpm  Variability: Moderate 6-25 bpm  Accelerations: 15 x 15 or greater, At variable times  Decelerations: None    Datil:   Contraction Frequency (minutes): 0    Placenta: Anterior    EFW: 7 5lb    GBS: Negative    Prenatal Labs:   Blood Type:   Lab Results   Component Value Date/Time    ABO Grouping B 10/09/2020 06:41 AM     , D (Rh type):   Lab Results   Component Value Date/Time    Rh Factor Positive 10/09/2020 06:41 AM     , Antibody Screen: No results found for: ANTIBODYSCR , HCT/HGB:   Lab Results   Component Value Date/Time    Hematocrit 40 9 04/21/2021 06:00 PM    Hemoglobin 13 4 04/21/2021 06:00 PM      , MCV:   Lab Results   Component Value Date/Time    MCV 89 04/21/2021 06:00 PM      , Platelets:   Lab Results   Component Value Date/Time    Platelets 496 35/58/9908 06:00 PM      , 1 hour Glucola:   Lab Results   Component Value Date/Time    Glucose 152 (H) 01/23/2021 11:10 AM   , Rubella:   Lab Results   Component Value Date/Time    Rubella IgG Quant 93 4 10/09/2020 06:41 AM        , VDRL/RPR:   Lab Results   Component Value Date/Time    RPR Non-Reactive 2021 11:10 AM      , Urine Culture/Screen:   Lab Results   Component Value Date/Time    Urine Culture 10,000-19,000 cfu/ml  10/09/2020 06:41 AM    Hep B:   Lab Results   Component Value Date/Time    Hepatitis B Surface Ag Non-reactive 10/09/2020 06:41 AM   HIV:   Lab Results   Component Value Date/Time    HIV-1/HIV-2 Ab Non-Reactive 10/09/2020 06:41 AM    Gonorrhea:   Lab Results   Component Value Date/Time    N gonorrhoeae, DNA Probe Negative 10/12/2020 04:39 PM   Group B Strep:    Lab Results   Component Value Date/Time    Strep Grp B PCR Negative 2021 03:49 PM          Assessment/Plan     ASSESSMENT:  39yo  at 38w0d weeks gestation who is being admitted for repeat  section due to SROM  On admission, glucose was noted to be 46 and patient was symptomatic with diaphoresis and fatigue  D5 IV infusion was started and patient reported improvement of symptoms  Patient does not desire sterilization  PLAN:   - Admit   - CBC, RPR, Blood Type   - Continue D5 in LR   - Ancef 2g IV and Azithromycin 500mg IV   - Analgesia per anesthesia   - Discussed with Dr Claudette Kenyon      This patient will be an INPATIENT  and I certify the anticipated length of stay is >2 Midnights      Jasmyn Mancuso MD  2021  6:39 PM

## 2021-04-21 NOTE — TELEPHONE ENCOUNTER
Pt stated dr Tish Rome called her last night and all her concerns were addressed  Pt was grateful for the follow up call

## 2021-04-21 NOTE — DISCHARGE INSTRUCTIONS
Self Care After Delivery   AMBULATORY CARE:   The postpartum period  is the period of time from delivery to about 6 weeks  During this time you may experience many physical and emotional changes  It is important to understand what is normal and when you need to call your healthcare provider  It is also important to know how to care for yourself during this time  Call your local emergency number (911 in the [facility] [facility]) for any of the following:   · You see or hear things that are not there, or have thoughts of harming yourself or your baby  · You soak through 1 pad in 15 minutes, have blurry vision, clammy or pale skin, and feel faint  · You faint or lose consciousness  · You have trouble breathing  · You cough up blood  · Your  incision comes apart  Seek care immediately if:   · Your heart is beating faster than usual     · You have a bad headache or changes in your vision  · Your episiotomy or  incision is red, swollen, bleeding, or draining pus  · You have severe abdominal pain  Call your doctor or obstetrician if:   · Your leg is painful, red, and larger than usual     · You soak through 1 or more pads in an hour, or pass blood clots larger than a quarter from your vagina  · You have a fever  · You have new or worsening pain in your abdomen or vagina  · You continue to have depression 1 to 2 weeks after you deliver  · You have trouble sleeping  · You have foul-smelling discharge from your vagina  · You have pain or burning when you urinate  · You do not have a bowel movement for 3 days or more  · You have nausea or are vomiting  · You have hard lumps or red streaks over your breasts  · You have cracked nipples or bleed from your nipples  · You have questions or concerns about your condition or care  Physical changes:   The following are normal changes after you give birth:  · Pain in the area between your anus and vagina    · Breast pain    · Constipation or hemorrhoids    · Hot or cold flashes    · Vaginal bleeding or discharge    · Mild to moderate abdominal cramping    · Difficulty controlling bowel movements or urine    Emotional changes:  A drop in hormone levels after you deliver may cause changes in your emotions  You may feel irritable, sad, or anxious  You may cry easily or for no reason  You may also feel depressed  Depression that continues can be a sign of postpartum depression, a condition that can be treated  Treatment may include talk therapy, medicines, or both  Healthcare providers will ask how you are feeling and if you have any depression  These talks can happen during appointments for your medical care and for your baby's care, such as well child visits  Providers can help you find ways to care for yourself and your baby  Talk to your providers about the following:  · When emotional changes or depression started, and if it is getting worse over time    · Problems you are having with daily activities, sleep, or caring for your baby    · If anything makes you feel worse, or makes you feel better    · Feeling that you are not bonding with your baby the way you want    · Any problems your baby has with sleeping or feeding    · Your baby is fussy or cries a lot    · Support you have from friends, family, or others    Breast care for breastfeeding mothers: You may have sore breasts for 3 to 6 days after you give birth  This happens as your milk begins to fill your breasts  You may also have sore breasts if you do not breastfeed frequently  Do the following to care for your breasts:  · Apply a moist, warm, compress to your breast as directed  This may help soothe your breasts  Make sure the washcloth is not too hot before you apply it to your breast     · Nurse your baby or pump your milk frequently  This may prevent clogged milk ducts  Ask your healthcare provider how often to nurse or pump      · Massage your breasts as directed  This may help increase your milk flow  Gently rub your breasts in a circular motion before you breastfeed  You may need to gently squeeze your breast or nipple to help release milk  You can also use a breast pump to help release milk from your breast     · Wash your breasts with warm water only  Do not put soap on your nipples  Soap may cause your nipples to become dry  · Apply lanolin cream to your nipples as directed  Lanolin cream may add moisture to your skin and prevent nipple dryness  Always  wash off lanolin cream with warm water before you breastfeed  · Place pads in your bra  Your nipples may leak milk when you are not breastfeeding  You can place pads inside of your bra to help prevent leaking onto your clothing  Ask your healthcare provider where to purchase bra pads  · Get breastfeeding support if needed  Healthcare providers can answer questions about breastfeeding and provide you with support  Ask your healthcare provider who you can contact if you need breastfeeding support  Breast care for non-breastfeeding mothers:  Milk will fill your breasts even if you bottle feed your baby  Do the following to help stop your milk from filling your breasts and causing pain:  · Wear a bra with support at all times  A sports bra or a tight-fitting bra will help stop your milk from coming in  · Apply ice on each breast for 15 to 20 minutes every hour or as directed  Use an ice pack, or put crushed ice in a plastic bag  Cover it with a towel before you apply it to your breast  Ice helps your milk ducts shrink  · Keep your breasts away from warm water  Warm water will make it easier for milk to fill your breasts  Stand with your breasts away from warm water in the shower  · Limit how much you touch your breasts  This will prevent them from filling with milk  Perineum care: Your perineum is the area between your rectum and vagina   It is normal to have swelling and pain in this area after you give birth  If you had an episiotomy, your healthcare provider may give you special instructions  · Clean your perineum after you use the bathroom  This may prevent infection and help with healing  Use a spray bottle with warm water to clean your perineum  You may also gently spray warm water against your perineum when you urinate  Always wipe front to back  · Take a sitz bath as directed  A sitz bath may help relieve swelling and pain  Fill your bath tub or bucket with water up to your hips and sit in the water  Use cold water for 2 days after you deliver  Then use warm water  Ask your healthcare provider for more information about a sitz bath  · Apply ice packs for the first 24 hours or as directed  Use a plastic glove filled with ice or buy an ice pack  Wrap the ice pack or plastic glove in a small towel or wash cloth  Place the ice pack on your perineum for 20 minutes at a time  · Sit on a donut-shaped pillow  This may relieve pressure on your perineum when you sit  · Use wipes that contain medicine or take pills as directed  Your healthcare provider may tell you to use witch hazel pads  You can place witch hazel pads in the refrigerator before you apply them to your perineum  Your provider may also tell you to take NSAIDs  Ask him or her how often to take pills or use the wipes  · Do not go swimming or take tub baths for 4 to 6 weeks or as directed  This will help prevent an infection in your vagina or uterus  Bowel and bladder care: It may take 3 to 5 days to have a bowel movement after you deliver your baby  You can do the following to prevent or manage constipation, and get control of your bowel or bladder:  · Take stool softeners as directed  A stool softener is medicine that will make your bowel movements softer  This may prevent or relieve constipation  A stool softener may also make bowel movements less painful  · Drink plenty of liquids    Ask how much liquid to drink each day and which liquids are best for you  Liquids may help prevent constipation  · Eat foods high in fiber  Examples include fruits, vegetables, grains, beans, and lentils  Ask your healthcare provider how much fiber you need each day  Fiber may prevent constipation  · Do Kegel exercises as directed  Kegel exercises will help strengthen the muscles that control bowel movements and urination  Ask your healthcare provider for more information on Kegel exercises  · Apply cold compresses or medicine to hemorrhoids as directed  This may relieve swelling and pain  Your healthcare provider may tell you to apply ice or wipes that contain medicine to your hemorrhoids  He or she may also tell you to use a sitz bath  Ask your provider for more information on how to manage hemorrhoids  Nutrition:  Good nutrition is important in the postpartum period  It will help you return to a healthy weight, increase your energy levels, and prevent constipation  It will also help you get enough nutrients and calories if you are going to breastfeed your baby  · Eat a variety of healthy foods  Healthy foods include fruits, vegetables, whole-grain breads, low-fat dairy products, beans, lean meats, and fish  You may need 500 to 700 extra calories each day if you breastfeed your baby  You may also need extra protein  · Limit foods with added sugar and high amounts of fat  These foods are high in calories and low in healthy nutrients  Read food labels so you know how much sugar and fat is in the food you want to eat  · Drink 8 to 10 glasses of water per day  Water will help you make plenty of milk for your baby  It will also help prevent constipation  Drink a glass of water every time you breastfeed your baby  · Take vitamins as directed  Ask your healthcare provider what vitamins you need  · Limit caffeine and alcohol if you are breastfeeding    Caffeine and alcohol can get into your breast milk  Caffeine and alcohol can make your baby fussy  They can also interfere with your baby's sleep  Ask your healthcare provider if you can drink alcohol or caffeine  Rest and sleep: You may feel very tired in the postpartum period  Enough sleep will help you heal and give you energy to care for your baby  The following may help you get sleep and rest:  · Nap when your baby naps  Your baby may nap several times during the day  Get rest during this time  · Limit visitors  Many people may want to see you and your baby  Ask friends or family to visit on different days  This will give you time to rest     · Do not plan too much for one day  Put off household chores so that you have time to rest  Gradually do more each day  · Ask for help from family, friends, or neighbors  Ask them to help you with laundry, cleaning, or errands  Also ask someone to watch the baby while you take a nap or relax  Ask your partner to help with the care of your baby  Pump some of your breast milk so your partner can feed your baby during the night  Exercise after delivery:  Wait until your healthcare provider says it is okay to exercise  Exercise can help you lose weight, increase your energy levels, and manage your mood  It can also prevent constipation and blood clots  Start with gentle exercises such as walking  Do more as you have more energy  You may need to avoid abdominal exercises for 1 to 2 weeks after you deliver  Talk to your healthcare provider about an exercise plan that is right for you  Sexual activity after delivery:   · Do not have sex until your healthcare provider says it is okay  You may need to wait 4 to 6 weeks before you have sex  This may prevent infection and allow time to heal     · Your menstrual cycle may begin as soon as 3 weeks after you deliver  Your period may be delayed if you breastfeed your baby  You can become pregnant before you get your first postpartum period   Talk to your healthcare provider about birth control that is right for you  Some types of birth control are not safe during breastfeeding  For support and more information:  Join a support group for new mothers  Ask for help from family and friends with chores, errands, and care of your baby  · Office of Women's Health,  Department of Health and Human Services  [address], [address] [facility]  [place] [address]  [address], [address] [facility] [address]  Phone: 4- [phone]  Web Address: www womenshealth gov  · March of [facility] [address] [facility] , [address]  [address] [facility] , [address] [facility]  Web Address: [employer]  [employer]/pregnancy/[email or web address]-care  aspx  Follow up with your doctor or obstetrician as directed: You will need to follow up within 2 to 6 weeks of delivery  Write down your questions so you remember to ask them at your visits  © Copyright [address] Information is for End User's use only and may not be sold, redistributed or otherwise used for commercial purposes  All illustrations and images included in CareNotes® are the copyrighted property of [employer]  or [address] [place], [place] [address]   The above information is an  only  It is not intended as medical advice for individual conditions or treatments  Talk to your doctor, nurse or pharmacist before following any medical regimen to see if it is safe and effective for you

## 2021-04-22 LAB
ERYTHROCYTE [DISTWIDTH] IN BLOOD BY AUTOMATED COUNT: 15.7 % (ref 11.6–15.1)
HCT VFR BLD AUTO: 32 % (ref 34.8–46.1)
HGB BLD-MCNC: 10.3 G/DL (ref 11.5–15.4)
MCH RBC QN AUTO: 29.3 PG (ref 26.8–34.3)
MCHC RBC AUTO-ENTMCNC: 32.2 G/DL (ref 31.4–37.4)
MCV RBC AUTO: 91 FL (ref 82–98)
PLATELET # BLD AUTO: 135 THOUSANDS/UL (ref 149–390)
PMV BLD AUTO: 13 FL (ref 8.9–12.7)
RBC # BLD AUTO: 3.52 MILLION/UL (ref 3.81–5.12)
RPR SER QL: NORMAL
WBC # BLD AUTO: 17.79 THOUSAND/UL (ref 4.31–10.16)

## 2021-04-22 PROCEDURE — 99024 POSTOP FOLLOW-UP VISIT: CPT | Performed by: STUDENT IN AN ORGANIZED HEALTH CARE EDUCATION/TRAINING PROGRAM

## 2021-04-22 PROCEDURE — 85027 COMPLETE CBC AUTOMATED: CPT | Performed by: OBSTETRICS & GYNECOLOGY

## 2021-04-22 RX ORDER — PANTOPRAZOLE SODIUM 20 MG/1
20 TABLET, DELAYED RELEASE ORAL
Status: DISCONTINUED | OUTPATIENT
Start: 2021-04-22 | End: 2021-04-22

## 2021-04-22 RX ORDER — LANOLIN ALCOHOL/MO/W.PET/CERES
3 CREAM (GRAM) TOPICAL
Status: DISCONTINUED | OUTPATIENT
Start: 2021-04-22 | End: 2021-04-24 | Stop reason: HOSPADM

## 2021-04-22 RX ORDER — FAMOTIDINE 20 MG/1
20 TABLET, FILM COATED ORAL 2 TIMES DAILY
Status: DISCONTINUED | OUTPATIENT
Start: 2021-04-22 | End: 2021-04-24 | Stop reason: HOSPADM

## 2021-04-22 RX ORDER — PANTOPRAZOLE SODIUM 20 MG/1
20 TABLET, DELAYED RELEASE ORAL DAILY PRN
Status: DISCONTINUED | OUTPATIENT
Start: 2021-04-22 | End: 2021-04-24 | Stop reason: HOSPADM

## 2021-04-22 RX ADMIN — BUPROPION HYDROCHLORIDE 300 MG: 150 TABLET, EXTENDED RELEASE ORAL at 01:28

## 2021-04-22 RX ADMIN — SODIUM CHLORIDE, SODIUM LACTATE, POTASSIUM CHLORIDE, AND CALCIUM CHLORIDE 125 ML/HR: .6; .31; .03; .02 INJECTION, SOLUTION INTRAVENOUS at 07:23

## 2021-04-22 RX ADMIN — BUPROPION HYDROCHLORIDE 300 MG: 150 TABLET, EXTENDED RELEASE ORAL at 21:55

## 2021-04-22 RX ADMIN — DOCUSATE SODIUM 100 MG: 100 CAPSULE, LIQUID FILLED ORAL at 09:43

## 2021-04-22 RX ADMIN — BUPROPION HYDROCHLORIDE 150 MG: 150 TABLET, EXTENDED RELEASE ORAL at 23:42

## 2021-04-22 RX ADMIN — CITALOPRAM HYDROBROMIDE 40 MG: 20 TABLET ORAL at 01:28

## 2021-04-22 RX ADMIN — KETOROLAC TROMETHAMINE 30 MG: 30 INJECTION, SOLUTION INTRAMUSCULAR at 21:01

## 2021-04-22 RX ADMIN — MELATONIN 3 MG: at 23:40

## 2021-04-22 RX ADMIN — KETOROLAC TROMETHAMINE 30 MG: 30 INJECTION, SOLUTION INTRAMUSCULAR at 09:42

## 2021-04-22 RX ADMIN — PANTOPRAZOLE SODIUM 20 MG: 20 TABLET, DELAYED RELEASE ORAL at 03:12

## 2021-04-22 RX ADMIN — KETOROLAC TROMETHAMINE 30 MG: 30 INJECTION, SOLUTION INTRAMUSCULAR at 02:57

## 2021-04-22 RX ADMIN — CITALOPRAM HYDROBROMIDE 40 MG: 20 TABLET ORAL at 21:55

## 2021-04-22 RX ADMIN — NALBUPHINE HYDROCHLORIDE 5 MG: 10 INJECTION, SOLUTION INTRAMUSCULAR; INTRAVENOUS; SUBCUTANEOUS at 02:57

## 2021-04-22 RX ADMIN — FAMOTIDINE 20 MG: 20 TABLET ORAL at 18:45

## 2021-04-22 RX ADMIN — ENOXAPARIN SODIUM 40 MG: 40 INJECTION SUBCUTANEOUS at 09:42

## 2021-04-22 RX ADMIN — BUPROPION HYDROCHLORIDE 150 MG: 150 TABLET, EXTENDED RELEASE ORAL at 01:28

## 2021-04-22 RX ADMIN — ACETAMINOPHEN 650 MG: 325 TABLET, FILM COATED ORAL at 14:15

## 2021-04-22 RX ADMIN — SODIUM CHLORIDE, SODIUM LACTATE, POTASSIUM CHLORIDE, AND CALCIUM CHLORIDE 999 ML/HR: .6; .31; .03; .02 INJECTION, SOLUTION INTRAVENOUS at 03:01

## 2021-04-22 NOTE — ANESTHESIA POSTPROCEDURE EVALUATION
Post-Op Assessment Note    CV Status:  Stable  Pain Score: 0    Pain management: adequate     Mental Status:  Alert and awake   Hydration Status:  Stable   PONV Controlled:  None   Airway Patency:  Patent      Post Op Vitals Reviewed: Yes      Staff: CRNA         No complications documented  BP   88/54   Temp   97 8   Pulse  65   Resp   16   SpO2   95% on RA   Postop VS in PACU noted above, SV non-obstructed  Denies pain

## 2021-04-22 NOTE — OP NOTE
OPERATIVE REPORT  PATIENT NAME: Janeth Shelby    :  1975  MRN: [id number]  Pt Location: [facility] L&D OR ROOM 02    SURGERY DATE: 2021    Surgeon(s) and Role:     * Vidya Garcia MD - Primary     * Colleen Clark MD - [address] [place] [address], DO - Assisting    Preop Diagnosis:  Previous  section complicating pregnancy [O53 624]  GDM, class A2 [O24 419]    Post-Op Diagnosis Codes:     * Previous  section complicating pregnancy [A57 908]     * GDM, class A2 [O24 419]    Procedure(s) (LRB):   SECTION () REPEAT (N/A)    Specimen(s):  ID Type Source Tests Collected by Time Destination   1 :  Tissue (Placenta on Hold) OB Only Placenta TISSUE EXAM OB (PLACENTA) ONLY Adi Nassar MD 2021    A :  Cord Blood Cord BLOOD GAS, VENOUS, CORD, BLOOD GAS, ARTERIAL, CORD Vidya Garcia MD 2021        Drains:  Urethral Catheter Non-latex 16 Fr  (Active)   Number of days: 0       [REMOVED] Closed/Suction Drain Anterior; Left Leg Accordion 10 Fr  (Removed)   Number of days: 0       [REMOVED] Urethral Catheter Double-lumen; Latex 16 Fr  (Removed)   Number of days: 1       Anesthesia Type: Spinal    Operative Indications:  Previous  section complicating pregnancy [I70 865]  GDM, class A2 [O24 419]  SROM  AMA  IVF pregnancy    Operative Findings:  1  Delivery of viable male on 21 at 2013, weight 6lbs 11oz; Apgar scores of 2 at one minute and 7 at five minutes and 8 at 10 minutes  Umbilical artery pH 2 928 (base excess -1 3)  2  Normal appearing placenta with centrally-inserted 3 vessel cord  3  Clear amniotic fluid  4  Grossly normal uterus, tubes, and ovaries  5  Dense adhesions between anterior uterus and bladder    Specimens:   1  Arterial and venous cord gases  2  Cord blood  3  Segment of umbilical cord  4   Placenta to pathology     Quantitative Blood Loss:  704 mL    Drains: Villegas catheter           Complications: None; patient tolerated the procedure well  Disposition: PACU            Condition: stable    Procedure Details     Elli Downey is a [id number] who presented to labor and delivery for SROM  Her pregnancy was complicated by the diagnoses listed above  She was admitted for repeat  section  Patient was made aware of these findings and the proposed plan  Risks, benefits, possible complications, alternate treatment options, and expected outcomes were discussed with the patient  The patient agreed with the proposed plan and gave informed consent  The patient was taken to the operating room where she was properly identified to the OR staff and attending physician  She received spinal anesthesia preoperatively  Fetal heart tones were appreciated and found to be appropriate  A Villegas catheter was aseptically inserted and SCDs were placed  The vagina was prepped with betadine and the abdomen was prepped with Chloraprep  Following appropriate drying time, the patient was draped in the usual sterile manner for a Pfannenstiel incision  The patient had received Ancef 2g IV pre-operatively for prophylaxis  A Time Out was held and the above information confirmed  The patient was identified as Elli Downey and the procedure verified as  Delivery  A Pfannenstiel incision was made and carried down through the underlying subcutaneous tissue to the fascia using a scalpel  Rectus fascia was then incised in the midline and extended laterally using Preston scissors and Bovie electrocautery  The superior edge of the fascia was grasped with Kocher clamps, tented upward, and the underlying muscle was dissected off with Bovie electrocautery  The rectus muscles were  and the peritoneum was identified and subsequently entered and extended longitudinally with blunt dissection Bovie electrocautery  Dense adhesions were noted as described above  The bladder blade was inserted        A low transverse uterine incision was made with the scalpel and extended laterally with blunt dissection  The amnion was entered sharply  The surgeon's hand was inserted through the hysterotomy and the fetal head was palpated but unable to be easily elevated to the hysterotomy  Bandage scissors were used to extend the incision laterally  Surgeons hand was again inserted through the hysterotomy and the fetal head was palpated, elevated and delivered through the uterine incision with assistance of fundal pressure  The umbilical cord was clamped and cut  The infant was handed off to the  providers  Arterial and venous cord gases, cord blood, and a segment of umbilical cord were obtained for evaluation and promptly sent to the lab  The placenta delivered spontaneously with uterine fundal massage and was noted to have a centrally inserted 3 vessel cord  This was also sent to the lab for placental pathology  The uterus was exteriorized and a moist lap sponge was used to clear the cavity of clots and products of conception  The uterine incision was closed with a running locked suture of 0 Vicryl  A second layer of the same suture was used to imbricate the first  Due to continued bleeding of the serosa, a third layer was used to imbricate the hysterotomy  Good hemostasis was confirmed upon uterine closure  The posterior culdesac was cleared of all clots with suction and the uterus was returned to the abdomen  The paracolic gutters were inspected and cleared of all clots and debris with moist lap sponges  The hysterotomy was re-examined the left corner was noted to be bleeding  A figure of 8 stitch was used and good hemostasis was achieved  The fascia was closed with a running suture of 0 Vicryl  Subcutaneous tissues were closed with 2-0 plain suture  The skin was closed with 4-0 Monocryl in a subcuticular fashion  Sterile dressing was applied and an abdominal binder was then placed       At the conclusion of the procedure, all needle, sponge, and instrument counts were noted to be correct x2  The patient tolerated the procedure well and was transferred to her the recovery room in stable condition  Dr Shanti Ennis was present and participated in all key portions of the case      Mague Pardo MD  10:38 PM  4/21/2021

## 2021-04-22 NOTE — PLAN OF CARE
Problem: Potential for Falls  Goal: Patient will remain free of falls  Description: INTERVENTIONS:  - Assess patient frequently for physical needs  -  Identify cognitive and physical deficits and behaviors that affect risk of falls    -  [place] fall precautions as indicated by assessment   - Educate patient/family on patient safety including physical limitations  - Instruct patient to call for assistance with activity based on assessment  - Modify environment to reduce risk of injury  - Consider OT/PT consult to assist with strengthening/mobility  Outcome: Progressing     Problem: PAIN - ADULT  Goal: Verbalizes/displays adequate comfort level or baseline comfort level  Description: Interventions:  - Encourage patient to monitor pain and request assistance  - Assess pain using appropriate pain scale  - Administer analgesics based on type and severity of pain and evaluate response  - Implement non-pharmacological measures as appropriate and evaluate response  - Consider cultural and social influences on pain and pain management  - Notify physician/advanced practitioner if interventions unsuccessful or patient reports new pain  Outcome: Progressing     Problem: INFECTION - ADULT  Goal: Absence or prevention of progression during hospitalization  Description: INTERVENTIONS:  - Assess and monitor for signs and symptoms of infection  - Monitor lab/diagnostic results  - Monitor all insertion sites, i e  indwelling lines, tubes, and drains  - Monitor endotracheal if appropriate and nasal secretions for changes in amount and color  - [place] appropriate cooling/warming therapies per order  - Administer medications as ordered  - Instruct and encourage patient and family to use good hand hygiene technique  - Identify and instruct in appropriate isolation precautions for identified infection/condition  Outcome: Progressing  Goal: Absence of fever/infection during neutropenic period  Description: INTERVENTIONS:  - Monitor WBC    Outcome: Progressing     Problem: SAFETY ADULT  Goal: Patient will remain free of falls  Description: INTERVENTIONS:  - Assess patient frequently for physical needs  -  Identify cognitive and physical deficits and behaviors that affect risk of falls    -  [place] fall precautions as indicated by assessment   - Educate patient/family on patient safety including physical limitations  - Instruct patient to call for assistance with activity based on assessment  - Modify environment to reduce risk of injury  - Consider OT/PT consult to assist with strengthening/mobility  Outcome: Progressing  Goal: Maintain or return to baseline ADL function  Description: INTERVENTIONS:  -  Assess patient's ability to carry out ADLs; assess patient's baseline for ADL function and identify physical deficits which impact ability to perform ADLs (bathing, care of mouth/teeth, toileting, grooming, dressing, etc )  - Assess/evaluate cause of self-care deficits   - Assess range of motion  - Assess patient's mobility; develop plan if impaired  - Assess patient's need for assistive devices and provide as appropriate  - Encourage maximum independence but intervene and supervise when necessary  - Involve family in performance of ADLs  - Assess for home care needs following discharge   - Consider OT consult to assist with ADL evaluation and planning for discharge  - Provide patient education as appropriate  Outcome: Progressing  Goal: Maintain or return mobility status to optimal level  Description: INTERVENTIONS:  - Assess patient's baseline mobility status (ambulation, transfers, stairs, etc )    - Identify cognitive and physical deficits and behaviors that affect mobility  - Identify mobility aids required to assist with transfers and/or ambulation (gait belt, sit-to-stand, lift, walker, cane, etc )  - [place] fall precautions as indicated by assessment  - Record patient progress and toleration of activity level on Mobility SBAR; progress patient to next Phase/Stage  - Instruct patient to call for assistance with activity based on assessment  - Consider rehabilitation consult to assist with strengthening/weightbearing, etc   Outcome: Progressing     Problem: Knowledge Deficit  Goal: Patient/family/caregiver demonstrates understanding of disease process, treatment plan, medications, and discharge instructions  Description: Complete learning assessment and assess knowledge base    Interventions:  - Provide teaching at level of understanding  - Provide teaching via preferred learning methods  Outcome: Progressing     Problem: DISCHARGE PLANNING  Goal: Discharge to home or other facility with appropriate resources  Description: INTERVENTIONS:  - Identify barriers to discharge w/patient and caregiver  - Arrange for needed discharge resources and transportation as appropriate  - Identify discharge learning needs (meds, wound care, etc )  - Arrange for interpretive services to assist at discharge as needed  - Refer to Case Management Department for coordinating discharge planning if the patient needs post-hospital services based on physician/advanced practitioner order or complex needs related to functional status, cognitive ability, or social support system  Outcome: Progressing     Problem: POSTPARTUM  Goal: Experiences normal postpartum course  Description: INTERVENTIONS:  - Monitor maternal vital signs  - Assess uterine involution and lochia  Outcome: Progressing  Goal: Appropriate maternal -  bonding  Description: INTERVENTIONS:  - Identify family support  - Assess for appropriate maternal/infant bonding   -Encourage maternal/infant bonding opportunities  - Referral to  or  as needed  Outcome: Progressing  Goal: Establishment of infant feeding pattern  Description: INTERVENTIONS:  - Assess breast/bottle feeding  - Refer to lactation as needed  Outcome: Progressing  Goal: Incision(s), wounds(s) or drain site(s) healing without S/S of infection  Description: INTERVENTIONS  - Assess and document risk factors for skin impairment   - Assess and document dressing, incision, wound bed, drain sites and surrounding tissue  - Consider nutrition services referral as needed  - Oral mucous membranes remain intact  - Provide patient/ family education  Outcome: Progressing

## 2021-04-22 NOTE — PLAN OF CARE
Problem: Potential for Falls  Goal: Patient will remain free of falls  Description: INTERVENTIONS:  - Assess patient frequently for physical needs  -  Identify cognitive and physical deficits and behaviors that affect risk of falls    -  [place] fall precautions as indicated by assessment   - Educate patient/family on patient safety including physical limitations  - Instruct patient to call for assistance with activity based on assessment  - Modify environment to reduce risk of injury  - Consider OT/PT consult to assist with strengthening/mobility  Outcome: Progressing     Problem: PAIN - ADULT  Goal: Verbalizes/displays adequate comfort level or baseline comfort level  Description: Interventions:  - Encourage patient to monitor pain and request assistance  - Assess pain using appropriate pain scale  - Administer analgesics based on type and severity of pain and evaluate response  - Implement non-pharmacological measures as appropriate and evaluate response  - Consider cultural and social influences on pain and pain management  - Notify physician/advanced practitioner if interventions unsuccessful or patient reports new pain  Outcome: Progressing     Problem: INFECTION - ADULT  Goal: Absence or prevention of progression during hospitalization  Description: INTERVENTIONS:  - Assess and monitor for signs and symptoms of infection  - Monitor lab/diagnostic results  - Monitor all insertion sites, i e  indwelling lines, tubes, and drains  - Monitor endotracheal if appropriate and nasal secretions for changes in amount and color  - [place] appropriate cooling/warming therapies per order  - Administer medications as ordered  - Instruct and encourage patient and family to use good hand hygiene technique  - Identify and instruct in appropriate isolation precautions for identified infection/condition  Outcome: Progressing  Goal: Absence of fever/infection during neutropenic period  Description: INTERVENTIONS:  - Monitor WBC    Outcome: Progressing     Problem: INFECTION - ADULT  Goal: Absence or prevention of progression during hospitalization  Description: INTERVENTIONS:  - Assess and monitor for signs and symptoms of infection  - Monitor lab/diagnostic results  - Monitor all insertion sites, i e  indwelling lines, tubes, and drains  - Monitor endotracheal if appropriate and nasal secretions for changes in amount and color  - [place] appropriate cooling/warming therapies per order  - Administer medications as ordered  - Instruct and encourage patient and family to use good hand hygiene technique  - Identify and instruct in appropriate isolation precautions for identified infection/condition  Outcome: Progressing  Goal: Absence of fever/infection during neutropenic period  Description: INTERVENTIONS:  - Monitor WBC    Outcome: Progressing     Problem: SAFETY ADULT  Goal: Patient will remain free of falls  Description: INTERVENTIONS:  - Assess patient frequently for physical needs  -  Identify cognitive and physical deficits and behaviors that affect risk of falls    -  [place] fall precautions as indicated by assessment   - Educate patient/family on patient safety including physical limitations  - Instruct patient to call for assistance with activity based on assessment  - Modify environment to reduce risk of injury  - Consider OT/PT consult to assist with strengthening/mobility  Outcome: Progressing  Goal: Maintain or return to baseline ADL function  Description: INTERVENTIONS:  -  Assess patient's ability to carry out ADLs; assess patient's baseline for ADL function and identify physical deficits which impact ability to perform ADLs (bathing, care of mouth/teeth, toileting, grooming, dressing, etc )  - Assess/evaluate cause of self-care deficits   - Assess range of motion  - Assess patient's mobility; develop plan if impaired  - Assess patient's need for assistive devices and provide as appropriate  - Encourage maximum independence but intervene and supervise when necessary  - Involve family in performance of ADLs  - Assess for home care needs following discharge   - Consider OT consult to assist with ADL evaluation and planning for discharge  - Provide patient education as appropriate  Outcome: Progressing  Goal: Maintain or return mobility status to optimal level  Description: INTERVENTIONS:  - Assess patient's baseline mobility status (ambulation, transfers, stairs, etc )    - Identify cognitive and physical deficits and behaviors that affect mobility  - Identify mobility aids required to assist with transfers and/or ambulation (gait belt, sit-to-stand, lift, walker, cane, etc )  - [place] fall precautions as indicated by assessment  - Record patient progress and toleration of activity level on Mobility SBAR; progress patient to next Phase/Stage  - Instruct patient to call for assistance with activity based on assessment  - Consider rehabilitation consult to assist with strengthening/weightbearing, etc   Outcome: Progressing     Problem: Knowledge Deficit  Goal: Patient/family/caregiver demonstrates understanding of disease process, treatment plan, medications, and discharge instructions  Description: Complete learning assessment and assess knowledge base    Interventions:  - Provide teaching at level of understanding  - Provide teaching via preferred learning methods  Outcome: Progressing     Problem: DISCHARGE PLANNING  Goal: Discharge to home or other facility with appropriate resources  Description: INTERVENTIONS:  - Identify barriers to discharge w/patient and caregiver  - Arrange for needed discharge resources and transportation as appropriate  - Identify discharge learning needs (meds, wound care, etc )  - Arrange for interpretive services to assist at discharge as needed  - Refer to Case Management Department for coordinating discharge planning if the patient needs post-hospital services based on physician/advanced practitioner order or complex needs related to functional status, cognitive ability, or social support system  Outcome: Progressing     Problem: POSTPARTUM  Goal: Experiences normal postpartum course  Description: INTERVENTIONS:  - Monitor maternal vital signs  - Assess uterine involution and lochia  Outcome: Progressing  Goal: Appropriate maternal -  bonding  Description: INTERVENTIONS:  - Identify family support  - Assess for appropriate maternal/infant bonding   -Encourage maternal/infant bonding opportunities  - Referral to  or  as needed  Outcome: Progressing  Goal: Establishment of infant feeding pattern  Description: INTERVENTIONS:  - Assess breast/bottle feeding  - Refer to lactation as needed  Outcome: Progressing  Goal: Incision(s), wounds(s) or drain site(s) healing without S/S of infection  Description: INTERVENTIONS  - Assess and document risk factors for skin impairment   - Assess and document dressing, incision, wound bed, drain sites and surrounding tissue  - Consider nutrition services referral as needed  - Oral mucous membranes remain intact  - Provide patient/ family education  Outcome: Progressing

## 2021-04-22 NOTE — PROGRESS NOTES
Progress Note - OB/GYN   Schuyler Carr 39 y o  female MRN: [id number]  Unit/Bed#: -01 Encounter: [id number]    Assessment:  39 y o  [id number] s/p Repeat low transverse  section post-operative day 1  Pregnancy complicated by gestational hypertension with preeclampsia labs within normal limits, history of ORIF in this pregnancy  Patient recovering well, Stable    Plan:  1  Postpartum  Continue routine post partum care  Pain management PRN  Encourage ambulation  Encourage breastfeeding    2  Gestational hypertension  BV since delivery     3  ORIF history in this pregnancy  Ortho recommends heparin until weight-bearing, tiger text sent to Dr Antoine Dempsey for recommendations about duration of treatment    4    Hgb 13 4 -->10 3  UOP 150cc/3h/90 7kg = 0 55 cc/kg/hr  Pull fan, [place] today    5   Discharge  Anticipate d/c pod 2/3      Subjective/Objective   Chief Complaint:    Postpartum state    Subjective:   Pain: yes, cramping, improved with meds  Tolerating PO: yes  Voiding: yes  Flatus: yes  BM: no  Ambulating: yes  Breastfeeding:  yes  Chest pain: no  Shortness of breath: no  Leg pain: no  Lochia: minimal    Objective:     Vitals: Temp:  [97 4 °F (36 3 °C)-98 3 °F (36 8 °C)] 98 3 °F (36 8 °C)  HR:  [62-88] 85  Resp:  [16-20] 18  BP: ()/(54-94) 126/74       Intake/Output Summary (Last 24 hours) at 2021 5638  Last data filed at 2021 6563  Gross per 24 hour   Intake 2760 ml   Output 1179 ml   Net 1581 ml         Physical Exam:   General: NAD, alert, oriented  Cardio: Regular rate and rhythm, no murmur  Resp: nonlabored breathing, clear to auscultation bilaterally  Abdomen: Soft, no distension/rebound/guarding/tenderness   Fundus: Firm, non-tender, fundus:  Fundus U +1  Incision: C/D/I  G/U:  Minimal lochia noted on pad  Lower Extremities: Non-tender, no palpable cords    Medications:  Current Facility-Administered Medications   Medication Dose Route Frequency    acetaminophen (TYLENOL) tablet 650 mg  650 mg Oral Q6H    acetaminophen (TYLENOL) tablet 650 mg  650 mg Oral Q6H PRN    benzocaine-menthol-lanolin-aloe (DERMOPLAST) 20-0 5 % topical spray 1 application  1 application Topical O1J PRN    buPROPion (WELLBUTRIN XL) 24 hr tablet 150 mg  150 mg Oral HS    buPROPion (WELLBUTRIN XL) 24 hr tablet 300 mg  300 mg Oral HS    calcium carbonate (TUMS) chewable tablet 1,000 mg  1,000 mg Oral Daily PRN    citalopram (CeleXA) tablet 40 mg  40 mg Oral HS    dexamethasone (DECADRON) injection 8 mg  8 mg Intravenous Once PRN    diphenhydrAMINE (BENADRYL) tablet 25 mg  25 mg Oral Q6H PRN    docusate sodium (COLACE) capsule 100 mg  100 mg Oral BID    enoxaparin (LOVENOX) subcutaneous injection 40 mg  40 mg Subcutaneous Q24H KENNETH    fentaNYL (SUBLIMAZE) injection 25 mcg  25 mcg Intravenous Q5 Min PRN    hydrocortisone 1 % cream 1 application  1 application Topical Daily PRN    HYDROmorphone (DILAUDID) injection 0 2 mg  0 2 mg Intravenous Q5 Min PRN    HYDROmorphone (DILAUDID) injection 0 5 mg  0 5 mg Intravenous Q2H PRN    [START ON 4/23/2021] ibuprofen (MOTRIN) tablet 600 mg  600 mg Oral Q6H PRN    ketorolac (TORADOL) injection 30 mg  30 mg Intravenous Q6H    lactated ringers infusion  125 mL/hr Intravenous Continuous    metoclopramide (REGLAN) injection 5 mg  5 mg Intravenous Q6H PRN    nalbuphine (NUBAIN) injection 5 mg  5 mg Intravenous Q6H PRN    naloxone (NARCAN) injection 0 1 mg  0 1 mg Intravenous Q3 min PRN    ondansetron (ZOFRAN) injection 4 mg  4 mg Intravenous Q4H PRN    oxyCODONE (ROXICODONE) immediate release tablet 10 mg  10 mg Oral Q4H PRN    oxyCODONE (ROXICODONE) IR tablet 5 mg  5 mg Oral Q4H PRN    pantoprazole (PROTONIX) EC tablet 20 mg  20 mg Oral Early Morning    simethicone (MYLICON) chewable tablet 80 mg  80 mg Oral 4x Daily PRN    sodium chloride 0 9 % infusion  125 mL/hr Intravenous Continuous    witch hazel-glycerin (TUCKS) topical pad 1 pad  1 pad Topical Q4H PRN       Labs:   Recent Results (from the past 24 hour(s))   Fingerstick Glucose (POCT)    Collection Time: 04/21/21  5:46 PM   Result Value Ref Range    POC Glucose 46 (L) 65 - 140 mg/dl   CBC    Collection Time: 04/21/21  6:00 PM   Result Value Ref Range    WBC 10 49 (H) 4 31 - 10 16 Thousand/uL    RBC 4 61 3 81 - 5 12 Million/uL    Hemoglobin 13 4 11 5 - 15 4 g/dL    Hematocrit 40 9 34 8 - 46 1 %    MCV 89 82 - 98 fL    MCH 29 1 26 8 - 34 3 pg    MCHC 32 8 31 4 - 37 4 g/dL    RDW 16 2 (H) 11 6 - 15 1 %    Platelets 990 833 - 476 Thousands/uL    MPV 12 9 (H) 8 9 - 12 7 fL   Type and screen and continue to monitor patient    Collection Time: 04/21/21  6:00 PM   Result Value Ref Range    ABO Grouping B     Rh Factor Positive     Antibody Screen Negative     Specimen Expiration Date [id number]    Comprehensive metabolic panel    Collection Time: 04/21/21  6:00 PM   Result Value Ref Range    Sodium 137 136 - 145 mmol/L    Potassium 3 8 3 5 - 5 3 mmol/L    Chloride 103 100 - 108 mmol/L    CO2 22 21 - 32 mmol/L    ANION GAP 12 4 - 13 mmol/L    BUN 11 5 - 25 mg/dL    Creatinine 0 55 (L) 0 60 - 1 30 mg/dL    Glucose 54 (L) 65 - 140 mg/dL    Calcium 9 6 8 3 - 10 1 mg/dL    Corrected Calcium 10 8 (H) 8 3 - 10 1 mg/dL    AST 22 5 - 45 U/L    ALT 27 12 - 78 U/L    Alkaline Phosphatase 238 (H) 46 - 116 U/L    Total Protein 7 3 6 4 - 8 2 g/dL    Albumin 2 5 (L) 3 5 - 5 0 g/dL    Total Bilirubin 0 19 (L) 0 20 - 1 00 mg/dL    eGFR 114 ml/min/1 73sq m   Fingerstick Glucose (POCT)    Collection Time: 04/21/21  6:51 PM   Result Value Ref Range    POC Glucose 68 65 - 140 mg/dl   Blood gas, venous, cord    Collection Time: 04/21/21  8:14 PM   Result Value Ref Range    pH, [place] 7 322 7 190 - 7 490    pCO2, Cord [place] 45 8 (H) 27 0 - 43 0 mm HG    pO2, Cord [place] 27 5 15 0 - 45 0 mm HG    HCO3, Cord [place] 23 2 12 2 - 28 6 mmol/L    Base Exc, Cord Trent -3 1 (L) 1 0 - 9 0 mmol/L    O2 Cont, Cord Trent 14 6 mL/dL    O2 HGB,VENOUS CORD 66 3 %   Blood gas, arterial, cord    Collection Time: 04/21/21  8:14 PM   Result Value Ref Range    pH, Cord Art 7 229 (L) 7 230 - 7 430    pCO2, Cord Art 69 1 (H) 30 0 - 60 0    pO2, Cord Art <10 0 5 0 - 25 0 mm HG    HCO3, Cord Art 28 2 (H) 17 3 - 27 3 mmol/L    Base Exc, Cord Art -1 3 (L) 3 0 - 11 0 mmol/L    O2 Content, Cord Art      O2 Hgb, Arterial Cord 12 1 %   Protein / creatinine ratio, urine    Collection Time: 04/21/21  8:17 PM   Result Value Ref Range    Creatinine, Ur 42 0 mg/dL    Protein Urine Random 6 mg/dL    Prot/Creat Ratio, Ur 0 14 (H) 0 00 - 0 10   Urinalysis with microscopic    Collection Time: 04/21/21  8:17 PM   Result Value Ref Range    Clarity, UA Clear     Color, UA Light Yellow     Specific [place], UA 1 015 1 003 - 1 030    pH, UA 6 5 4 5, 5 0, 5 5, 6 0, 6 5, 7 0, 7 5, 8 0    Glucose, UA Negative Negative mg/dl    Ketones, UA Negative Negative mg/dl    Blood, UA Negative Negative    Protein, UA Negative Negative mg/dl    Nitrite, UA Negative Negative    Bilirubin, UA Negative Negative    Urobilinogen, UA 0 2 0 2, 1 0 E U /dl E U /dl    Leukocytes, UA Negative Negative    WBC, UA None Seen None Seen, 2-4 /hpf    RBC, UA 1-2 (A) None Seen, 2-4 /hpf    Bacteria, UA None Seen None Seen, Occasional /hpf    Epithelial Cells Occasional None Seen, Occasional /hpf   Fingerstick Glucose (POCT)    Collection Time: 04/21/21  9:42 PM   Result Value Ref Range    POC Glucose 44 (L) 65 - 140 mg/dl   Fingerstick Glucose (POCT)    Collection Time: 04/21/21 10:44 PM   Result Value Ref Range    POC Glucose 83 65 - 140 mg/dl   CBC    Collection Time: 04/22/21  5:30 AM   Result Value Ref Range    WBC 17 79 (H) 4 31 - 10 16 Thousand/uL    RBC 3 52 (L) 3 81 - 5 12 Million/uL    Hemoglobin 10 3 (L) 11 5 - 15 4 g/dL    Hematocrit 32 0 (L) 34 8 - 46 1 %    MCV 91 82 - 98 fL    MCH 29 3 26 8 - 34 3 pg    MCHC 32 2 31 4 - 37 4 g/dL    RDW 15 7 (H) 11 6 - 15 1 %    Platelets 973 (L) 484 - 390 Thousands/uL MPV 13 0 (H) 8 9 - 12 7 fL         Luisana Orosco  Ob/Gyn PGY-1  4/22/2021  6:55 AM

## 2021-04-23 PROCEDURE — 99024 POSTOP FOLLOW-UP VISIT: CPT | Performed by: STUDENT IN AN ORGANIZED HEALTH CARE EDUCATION/TRAINING PROGRAM

## 2021-04-23 PROCEDURE — 99024 POSTOP FOLLOW-UP VISIT: CPT | Performed by: OBSTETRICS & GYNECOLOGY

## 2021-04-23 RX ADMIN — DOCUSATE SODIUM 100 MG: 100 CAPSULE, LIQUID FILLED ORAL at 08:38

## 2021-04-23 RX ADMIN — IBUPROFEN 600 MG: 600 TABLET, FILM COATED ORAL at 22:35

## 2021-04-23 RX ADMIN — MELATONIN 3 MG: at 22:52

## 2021-04-23 RX ADMIN — FAMOTIDINE 20 MG: 20 TABLET ORAL at 08:38

## 2021-04-23 RX ADMIN — DOCUSATE SODIUM 100 MG: 100 CAPSULE, LIQUID FILLED ORAL at 17:52

## 2021-04-23 RX ADMIN — ACETAMINOPHEN 650 MG: 325 TABLET, FILM COATED ORAL at 06:19

## 2021-04-23 RX ADMIN — ACETAMINOPHEN 650 MG: 325 TABLET, FILM COATED ORAL at 12:08

## 2021-04-23 RX ADMIN — CITALOPRAM HYDROBROMIDE 40 MG: 20 TABLET ORAL at 22:35

## 2021-04-23 RX ADMIN — FAMOTIDINE 20 MG: 20 TABLET ORAL at 17:52

## 2021-04-23 RX ADMIN — BUPROPION HYDROCHLORIDE 300 MG: 150 TABLET, EXTENDED RELEASE ORAL at 22:33

## 2021-04-23 RX ADMIN — IBUPROFEN 600 MG: 600 TABLET, FILM COATED ORAL at 15:52

## 2021-04-23 RX ADMIN — IBUPROFEN 600 MG: 600 TABLET, FILM COATED ORAL at 06:19

## 2021-04-23 RX ADMIN — BUPROPION HYDROCHLORIDE 150 MG: 150 TABLET, EXTENDED RELEASE ORAL at 22:33

## 2021-04-23 RX ADMIN — ACETAMINOPHEN 650 MG: 325 TABLET, FILM COATED ORAL at 01:29

## 2021-04-23 RX ADMIN — ENOXAPARIN SODIUM 40 MG: 40 INJECTION SUBCUTANEOUS at 08:38

## 2021-04-23 NOTE — PROGRESS NOTES
Progress Note - OB/GYN   Devoria Barefoot 39 y o  female MRN: [id number]  Unit/Bed#: -01 Encounter: [id number]    Assessment:  39 y o  [id number] s/p Repeat low transverse  section post-operative day 2  Pregnancy complicated by gestational hypertension with preeclampsia labs within normal limits, history of ORIF in this pregnancy  Patient recovering well, Stable     Plan:  1  Postpartum  Continue routine post partum care  Pain management PRN  Encourage ambulation  Encourage breastfeeding     2  Gestational hypertension  BV since delivery      3  ORIF history in this pregnancy  Ortho recommends heparin until weight-bearing, tiger text sent to Dr Forest Cabrera for recommendations about duration of treatment     4    Hgb 13 4 -->10 3  UOP 150cc/3h/90 7kg = 0 55 cc/kg/hr  Pull fan, [place] today     5   Discharge  Can consider d/c today      Subjective/Objective   Chief Complaint:    Postpartum state    Subjective:   Pain: yes, cramping, improved with meds  Tolerating PO: yes  Voiding: yes  Flatus: yes  BM: no  Ambulating: yes  Breastfeeding:  yes  Chest pain: no  Shortness of breath: no  Leg pain: no  Lochia: minimal    Objective:     Vitals: Temp:  [98 °F (36 7 °C)-98 7 °F (37 1 °C)] 98 °F (36 7 °C)  HR:  [81-99] 81  Resp:  [18] 18  BP: (100-128)/(57-75) 105/57       Intake/Output Summary (Last 24 hours) at 2021 6201  Last data filed at 2021 1936  Gross per 24 hour   Intake --   Output 1200 ml   Net -1200 ml         Physical Exam:   General: NAD, alert, oriented  Cardio: Regular rate and rhythm, no murmur  Resp: nonlabored breathing, clear to auscultation bilaterally  Abdomen: Soft, no distension/rebound/guarding/tenderness   Fundus: Firm, non-tender, fundus: at umbilicus  Incision: C/D/I  G/U: Minimal lochia noted on pad  Lower Extremities: Non-tender, no palpable cords    Medications:  Current Facility-Administered Medications   Medication Dose Route Frequency    acetaminophen (TYLENOL) tablet 650 mg  650 mg Oral Q6H PRN    benzocaine-menthol-lanolin-aloe (DERMOPLAST) 20-0 5 % topical spray 1 application  1 application Topical D8C PRN    buPROPion (WELLBUTRIN XL) 24 hr tablet 150 mg  150 mg Oral HS    buPROPion (WELLBUTRIN XL) 24 hr tablet 300 mg  300 mg Oral HS    calcium carbonate (TUMS) chewable tablet 1,000 mg  1,000 mg Oral Daily PRN    citalopram (CeleXA) tablet 40 mg  40 mg Oral HS    diphenhydrAMINE (BENADRYL) tablet 25 mg  25 mg Oral Q6H PRN    docusate sodium (COLACE) capsule 100 mg  100 mg Oral BID    enoxaparin (LOVENOX) subcutaneous injection 40 mg  40 mg Subcutaneous Q24H KENNETH    famotidine (PEPCID) tablet 20 mg  20 mg Oral BID    fentaNYL (SUBLIMAZE) injection 25 mcg  25 mcg Intravenous Q5 Min PRN    hydrocortisone 1 % cream 1 application  1 application Topical Daily PRN    HYDROmorphone (DILAUDID) injection 0 2 mg  0 2 mg Intravenous Q5 Min PRN    HYDROmorphone (DILAUDID) injection 0 5 mg  0 5 mg Intravenous Q2H PRN    ibuprofen (MOTRIN) tablet 600 mg  600 mg Oral Q6H PRN    lactated ringers infusion  125 mL/hr Intravenous Continuous    melatonin tablet 3 mg  3 mg Oral HS    nalbuphine (NUBAIN) injection 5 mg  5 mg Intravenous Q6H PRN    oxyCODONE (ROXICODONE) immediate release tablet 10 mg  10 mg Oral Q4H PRN    oxyCODONE (ROXICODONE) IR tablet 5 mg  5 mg Oral Q4H PRN    pantoprazole (PROTONIX) EC tablet 20 mg  20 mg Oral Daily PRN    simethicone (MYLICON) chewable tablet 80 mg  80 mg Oral 4x Daily PRN    sodium chloride 0 9 % infusion  125 mL/hr Intravenous Continuous    witch hazel-glycerin (TUCKS) topical pad 1 pad  1 pad Topical Q4H PRN       Labs:   No results found for this or any previous visit (from the past 24 hour(s))        Marley Connolly  Ob/Gyn PGY-1  4/23/2021  6:58 AM

## 2021-04-23 NOTE — PLAN OF CARE
Problem: Potential for Falls  Goal: Patient will remain free of falls  Description: INTERVENTIONS:  - Assess patient frequently for physical needs  -  Identify cognitive and physical deficits and behaviors that affect risk of falls    -  [place] fall precautions as indicated by assessment   - Educate patient/family on patient safety including physical limitations  - Instruct patient to call for assistance with activity based on assessment  - Modify environment to reduce risk of injury  - Consider OT/PT consult to assist with strengthening/mobility  Outcome: Progressing     Problem: PAIN - ADULT  Goal: Verbalizes/displays adequate comfort level or baseline comfort level  Description: Interventions:  - Encourage patient to monitor pain and request assistance  - Assess pain using appropriate pain scale  - Administer analgesics based on type and severity of pain and evaluate response  - Implement non-pharmacological measures as appropriate and evaluate response  - Consider cultural and social influences on pain and pain management  - Notify physician/advanced practitioner if interventions unsuccessful or patient reports new pain  Outcome: Progressing     Problem: INFECTION - ADULT  Goal: Absence or prevention of progression during hospitalization  Description: INTERVENTIONS:  - Assess and monitor for signs and symptoms of infection  - Monitor lab/diagnostic results  - Monitor all insertion sites, i e  indwelling lines, tubes, and drains  - Monitor endotracheal if appropriate and nasal secretions for changes in amount and color  - [place] appropriate cooling/warming therapies per order  - Administer medications as ordered  - Instruct and encourage patient and family to use good hand hygiene technique  - Identify and instruct in appropriate isolation precautions for identified infection/condition  Outcome: Progressing  Goal: Absence of fever/infection during neutropenic period  Description: INTERVENTIONS:  - Monitor WBC    Outcome: Progressing     Problem: SAFETY ADULT  Goal: Patient will remain free of falls  Description: INTERVENTIONS:  - Assess patient frequently for physical needs  -  Identify cognitive and physical deficits and behaviors that affect risk of falls    -  [place] fall precautions as indicated by assessment   - Educate patient/family on patient safety including physical limitations  - Instruct patient to call for assistance with activity based on assessment  - Modify environment to reduce risk of injury  - Consider OT/PT consult to assist with strengthening/mobility  Outcome: Progressing  Goal: Maintain or return to baseline ADL function  Description: INTERVENTIONS:  -  Assess patient's ability to carry out ADLs; assess patient's baseline for ADL function and identify physical deficits which impact ability to perform ADLs (bathing, care of mouth/teeth, toileting, grooming, dressing, etc )  - Assess/evaluate cause of self-care deficits   - Assess range of motion  - Assess patient's mobility; develop plan if impaired  - Assess patient's need for assistive devices and provide as appropriate  - Encourage maximum independence but intervene and supervise when necessary  - Involve family in performance of ADLs  - Assess for home care needs following discharge   - Consider OT consult to assist with ADL evaluation and planning for discharge  - Provide patient education as appropriate  Outcome: Progressing  Goal: Maintain or return mobility status to optimal level  Description: INTERVENTIONS:  - Assess patient's baseline mobility status (ambulation, transfers, stairs, etc )    - Identify cognitive and physical deficits and behaviors that affect mobility  - Identify mobility aids required to assist with transfers and/or ambulation (gait belt, sit-to-stand, lift, walker, cane, etc )  - [place] fall precautions as indicated by assessment  - Record patient progress and toleration of activity level on Mobility SBAR; progress patient to next Phase/Stage  - Instruct patient to call for assistance with activity based on assessment  - Consider rehabilitation consult to assist with strengthening/weightbearing, etc   Outcome: Progressing     Problem: Knowledge Deficit  Goal: Patient/family/caregiver demonstrates understanding of disease process, treatment plan, medications, and discharge instructions  Description: Complete learning assessment and assess knowledge base    Interventions:  - Provide teaching at level of understanding  - Provide teaching via preferred learning methods  Outcome: Progressing     Problem: DISCHARGE PLANNING  Goal: Discharge to home or other facility with appropriate resources  Description: INTERVENTIONS:  - Identify barriers to discharge w/patient and caregiver  - Arrange for needed discharge resources and transportation as appropriate  - Identify discharge learning needs (meds, wound care, etc )  - Arrange for interpretive services to assist at discharge as needed  - Refer to Case Management Department for coordinating discharge planning if the patient needs post-hospital services based on physician/advanced practitioner order or complex needs related to functional status, cognitive ability, or social support system  Outcome: Progressing     Problem: POSTPARTUM  Goal: Experiences normal postpartum course  Description: INTERVENTIONS:  - Monitor maternal vital signs  - Assess uterine involution and lochia  Outcome: Progressing  Goal: Appropriate maternal -  bonding  Description: INTERVENTIONS:  - Identify family support  - Assess for appropriate maternal/infant bonding   -Encourage maternal/infant bonding opportunities  - Referral to  or  as needed  Outcome: Progressing  Goal: Establishment of infant feeding pattern  Description: INTERVENTIONS:  - Assess breast/bottle feeding  - Refer to lactation as needed  Outcome: Progressing  Goal: Incision(s), wounds(s) or drain site(s) healing without S/S of infection  Description: INTERVENTIONS  - Assess and document risk factors for skin impairment   - Assess and document dressing, incision, wound bed, drain sites and surrounding tissue  - Consider nutrition services referral as needed  - Oral mucous membranes remain intact  - Provide patient/ family education  Outcome: Progressing

## 2021-04-24 VITALS
DIASTOLIC BLOOD PRESSURE: 72 MMHG | WEIGHT: 200 LBS | OXYGEN SATURATION: 98 % | TEMPERATURE: 99 F | BODY MASS INDEX: 35.44 KG/M2 | RESPIRATION RATE: 18 BRPM | HEIGHT: 63 IN | HEART RATE: 86 BPM | SYSTOLIC BLOOD PRESSURE: 114 MMHG

## 2021-04-24 RX ORDER — IBUPROFEN 600 MG/1
600 TABLET ORAL EVERY 6 HOURS SCHEDULED
Qty: 30 TABLET | Refills: 0
Start: 2021-04-24 | End: 2022-01-27 | Stop reason: ALTCHOICE

## 2021-04-24 RX ORDER — ACETAMINOPHEN 325 MG/1
650 TABLET ORAL EVERY 6 HOURS
Qty: 30 TABLET | Refills: 0
Start: 2021-04-24 | End: 2022-01-27 | Stop reason: ALTCHOICE

## 2021-04-24 RX ORDER — HEPARIN SODIUM 5000 [USP'U]/ML
5000 INJECTION, SOLUTION INTRAVENOUS; SUBCUTANEOUS EVERY 12 HOURS SCHEDULED
Qty: 28 ML | Refills: 0 | Status: SHIPPED | OUTPATIENT
Start: 2021-04-24 | End: 2021-07-09

## 2021-04-24 RX ORDER — DOCUSATE SODIUM 100 MG/1
100 CAPSULE, LIQUID FILLED ORAL 2 TIMES DAILY
Qty: 10 CAPSULE | Refills: 0
Start: 2021-04-24 | End: 2022-01-27 | Stop reason: ALTCHOICE

## 2021-04-24 RX ORDER — OXYCODONE HYDROCHLORIDE 5 MG/1
5 TABLET ORAL EVERY 4 HOURS PRN
Qty: 7 TABLET | Refills: 0 | Status: SHIPPED | OUTPATIENT
Start: 2021-04-24 | End: 2021-05-04

## 2021-04-24 RX ADMIN — IBUPROFEN 600 MG: 600 TABLET, FILM COATED ORAL at 15:57

## 2021-04-24 RX ADMIN — OXYCODONE HYDROCHLORIDE 5 MG: 5 TABLET ORAL at 00:24

## 2021-04-24 RX ADMIN — DOCUSATE SODIUM 100 MG: 100 CAPSULE, LIQUID FILLED ORAL at 09:12

## 2021-04-24 RX ADMIN — FAMOTIDINE 20 MG: 20 TABLET ORAL at 09:12

## 2021-04-24 RX ADMIN — ENOXAPARIN SODIUM 40 MG: 40 INJECTION SUBCUTANEOUS at 09:12

## 2021-04-24 RX ADMIN — IBUPROFEN 600 MG: 600 TABLET, FILM COATED ORAL at 09:29

## 2021-04-24 RX ADMIN — ACETAMINOPHEN 650 MG: 325 TABLET, FILM COATED ORAL at 12:22

## 2021-04-24 NOTE — PLAN OF CARE
Problem: Potential for Falls  Goal: Patient will remain free of falls  Description: INTERVENTIONS:  - Assess patient frequently for physical needs  -  Identify cognitive and physical deficits and behaviors that affect risk of falls    -  [place] fall precautions as indicated by assessment   - Educate patient/family on patient safety including physical limitations  - Instruct patient to call for assistance with activity based on assessment  - Modify environment to reduce risk of injury  - Consider OT/PT consult to assist with strengthening/mobility  Outcome: Completed

## 2021-04-24 NOTE — PLAN OF CARE
Problem: Potential for Falls  Goal: Patient will remain free of falls  Description: INTERVENTIONS:  - Assess patient frequently for physical needs  -  Identify cognitive and physical deficits and behaviors that affect risk of falls    -  [place] fall precautions as indicated by assessment   - Educate patient/family on patient safety including physical limitations  - Instruct patient to call for assistance with activity based on assessment  - Modify environment to reduce risk of injury  - Consider OT/PT consult to assist with strengthening/mobility  Outcome: Progressing

## 2021-04-24 NOTE — PROGRESS NOTES
Progress Note - OB/GYN   Vera Jernigan 39 y o  female MRN: [id number]  Unit/Bed#: -01 Encounter: [id number]    Assessment:  Post partum Day #3 s/p RLTCS, stable, baby in Nursery   Hb Pre-surgery 13 4 , Hb Post-surgery 10 3  Passed her voiding trial   Term pregnancy   Single male viable fetus  Pregnancy complicated by :    1) Gestational hypertension:  Protein creatinine ratio of 0 14, CBC and CMP within normal limits, blood pressures 100s to 110s over 50s to 70s  Patient is asymptomatic  2) History of Open reduction and internal fixation after fracture in left extremity in this pregnancy, currently on Lovenox 40 mg daily, after procedure patient was on lovenox for some time followed by heparin due to insurance coverage issues, currently patient is walking with help of a walker, she had her boot on the room  Discussion with orthopedia at this time with not further recommendation for anticoagulation  She will continue follow-up outpatient with orthopedia next 5/25/21  Consider continue anticoagulation for couple weeks postpartum  Plan:  Continue routine post partum care  Encourage ambulation  Encourage breastfeeding  Anticipate discharge today     Subjective/Objective   Chief Complaint:     Post delivery  Patient is doing well  Lochia WNL  Pain well controlled       Subjective:     Pain: yes, cramping, improved with meds  Tolerating PO: yes  Voiding: yes  Flatus: yes  BM: no  Ambulating: yes  Chest pain: no  Shortness of breath: no  Leg pain: no  Lochia: minimal    Objective:     Vitals: /60 (BP Location: Right arm)   Pulse 84   Temp 98 °F (36 7 °C) (Oral)   Resp 18   Ht 5' 3" (1 6 m)   Wt 90 7 kg (200 lb) Comment: approximate due to weight bearing status   LMP 07/28/2020 (Exact Date)   SpO2 98%   Breastfeeding No   BMI 35 43 kg/m²     No intake or output data in the 24 hours ending 04/23/21 4750    Lab Results   Component Value Date    WBC 17 79 (H) 04/22/2021    HGB 10 3 (L) 04/22/2021    HCT 32 0 (L) 04/22/2021    MCV 91 04/22/2021     (L) 04/22/2021       Physical Exam:     Gen: AAOx3, NAD  CV: RRR  Lungs: CTA b/l  Abd: Soft, non-tender, non-distended, no rebound or guarding  Uterine fundus firm and non-tender, 1 cm below the umbilicus  Surgical incision without irritative signs, no bleeding, no edema     Ext: Non tender    Flavio Gerardo MD  4/23/2021  11:47 PM

## 2021-04-27 ENCOUNTER — APPOINTMENT (OUTPATIENT)
Dept: PHYSICAL THERAPY | Facility: CLINIC | Age: 46
End: 2021-04-27
Payer: COMMERCIAL

## 2021-05-03 ENCOUNTER — TELEPHONE (OUTPATIENT)
Dept: OBGYN CLINIC | Facility: CLINIC | Age: 46
End: 2021-05-03

## 2021-05-03 NOTE — TELEPHONE ENCOUNTER
lmtcb  Del c section 4/21  Hgb 10 3 on 4 /22  Pt has had 3 c sections  Had anlke surgery prior to delivery and is onm Heparin  Does not do much walking - per ankle problem  Bowels and bladder ok  Bleeding is decreased as of today  Pt someyimes gets dizzy - when reclining  Pt seems ok delivery wise   Will cb if bleeding increases

## 2021-05-03 NOTE — TELEPHONE ENCOUNTER
----- Message from Raphael Stubbs sent at 5/2/2021 10:22 PM EDT -----  Regarding: Non-Urgent Medical Question  Contact: [phone]  Hi Dr Elizabeth Portillo,    I spoke with you a few days ago and I had told you my bleeding was minimal  It has definitely increased the past few days  I also noticed occasionally when I go to lay down I get a little dizzy  Not sure if this is normal  I know I have a history of anemia and have been on iron pills during the pregnancy  Also, is it normal to have pain at the incision still?

## 2021-05-10 ENCOUNTER — POSTPARTUM VISIT (OUTPATIENT)
Dept: OBGYN CLINIC | Facility: CLINIC | Age: 46
End: 2021-05-10

## 2021-05-10 VITALS — BODY MASS INDEX: 31.32 KG/M2 | WEIGHT: 176.8 LBS | DIASTOLIC BLOOD PRESSURE: 70 MMHG | SYSTOLIC BLOOD PRESSURE: 110 MMHG

## 2021-05-10 DIAGNOSIS — Z98.891 S/P REPEAT LOW TRANSVERSE C-SECTION: Primary | ICD-10-CM

## 2021-05-10 PROCEDURE — 99024 POSTOP FOLLOW-UP VISIT: CPT | Performed by: STUDENT IN AN ORGANIZED HEALTH CARE EDUCATION/TRAINING PROGRAM

## 2021-05-10 RX ORDER — DOXYCYCLINE HYCLATE 50 MG/1
324 CAPSULE, GELATIN COATED ORAL
COMMUNITY
End: 2022-01-27 | Stop reason: ALTCHOICE

## 2021-05-10 RX ORDER — OMEGA-3S/DHA/EPA/FISH OIL/D3 300MG-1000
400 CAPSULE ORAL DAILY
COMMUNITY
End: 2022-01-27 | Stop reason: ALTCHOICE

## 2021-05-10 NOTE — PROGRESS NOTES
Assessment/Plan:     S/P repeat low transverse   Recovering well  Return for annual exam - discuss contraception + mammo at that time  Gestational diabetes mellitus (GDM), postpartum  -     Glucose FORREST 2HR 75GM Nonpreg; Future    Post partum depression  -     Ambulatory referral to [facility]; Future  Will discuss meds w/ PCP  F/u PRN  Other orders  -     Prenatal Vit-Fe Fumarate-FA (PRENATAL 1+1 PO); Take by mouth  -     cholecalciferol (VITAMIN D3) 400 units tablet; Take 400 Units by mouth daily  -     ferrous gluconate (FERGON) 324 mg tablet; Take 324 mg by mouth daily with breakfast          Subjective:     Patient ID: Emily Fine is a 39 y o  female  39 y o  [id number] presents 3 weeks s/p a RLTCS - complicated by difficult delivery  Baby in NICU initially due to respiratory issues, quickly recovered  Then monitored further for glucoses  He is now doing well  Justine Cortez reports some incisional tenderness  She reports her lochia is decreasing  She is bottle feeding  She is undecided on contraception  She scored 11 on the [place] depression scale  On wellbutrin and celexa, managed by PCP  She is tearful at night  Left foot still in boot following ORIF and she's not full weight bearing - can't hold baby and walk/stand for extended period of time  Otherwise mostly functioning OK  Pt's  is with her who does notice she's more depressed  Review of Systems   All other systems reviewed and are negative  Objective:     Physical Exam  Vitals signs reviewed  Pulmonary:      Effort: Pulmonary effort is normal    Abdominal:      Comments: Incision healing well   Neurological:      Mental Status: She is alert and oriented to person, place, and time     Psychiatric:         Behavior: Behavior normal

## 2021-05-11 ENCOUNTER — APPOINTMENT (OUTPATIENT)
Dept: PHYSICAL THERAPY | Facility: CLINIC | Age: 46
End: 2021-05-11
Payer: COMMERCIAL

## 2021-05-14 ENCOUNTER — OFFICE VISIT (OUTPATIENT)
Dept: OBGYN CLINIC | Facility: CLINIC | Age: 46
End: 2021-05-14

## 2021-05-14 ENCOUNTER — APPOINTMENT (OUTPATIENT)
Dept: RADIOLOGY | Facility: AMBULARY SURGERY CENTER | Age: 46
End: 2021-05-14
Attending: ORTHOPAEDIC SURGERY
Payer: COMMERCIAL

## 2021-05-14 VITALS
WEIGHT: 176 LBS | SYSTOLIC BLOOD PRESSURE: 126 MMHG | BODY MASS INDEX: 31.18 KG/M2 | DIASTOLIC BLOOD PRESSURE: 91 MMHG | HEIGHT: 63 IN | HEART RATE: 87 BPM

## 2021-05-14 DIAGNOSIS — S82.852D CLOSED DISPLACED TRIMALLEOLAR FRACTURE OF LEFT ANKLE WITH ROUTINE HEALING, SUBSEQUENT ENCOUNTER: Primary | ICD-10-CM

## 2021-05-14 DIAGNOSIS — S82.852D CLOSED DISPLACED TRIMALLEOLAR FRACTURE OF LEFT ANKLE WITH ROUTINE HEALING, SUBSEQUENT ENCOUNTER: ICD-10-CM

## 2021-05-14 PROCEDURE — 99024 POSTOP FOLLOW-UP VISIT: CPT | Performed by: ORTHOPAEDIC SURGERY

## 2021-05-14 PROCEDURE — 73610 X-RAY EXAM OF ANKLE: CPT

## 2021-05-14 NOTE — PATIENT INSTRUCTIONS
You may begin weaning your boot and transitioning to a sneaker (5/14)  It is important to do this gradually to avoid aggravating the healing process  1  5/14, you may come out of the boot into a sneaker for 2 hours  2  5/15, you may come out of the boot into a sneaker for 4 hours,  3  The next day, you may come out of the boot into a sneaker for 6 hours  4  Continue this (adding 2 hours per day) as you tolerate  For example, if you do 6 hours out of the boot into a sneaker and your foot swells more than usual at night and it is difficult to control the discomfort, do not advance to 8 hours the next day, stay at 6 hours until you are able to tolerate it  Elevation, Ice and tylenol and staying off of it at night will be important to aide in this transition out of the boot  Swelling and soreness are normal as you begin to do more with the injured leg  May DC Heparin, no longer needed  Continue PT  Scar massage- pea sized amount of lotion, massage into scar for 5 minutes each day  Compression stocking (Knee high, 20-30mm Hg) to be worn at all times while awake  Recommend taking the following supplements: Vitamin D 4000 units per day and Calcium 1200 mg per day  This will help with bone healing

## 2021-05-14 NOTE — PROGRESS NOTES
MELIZA Fowler  Attending, Orthopaedic Surgery  Foot and Ankle  [address] [facility]      ORTHOPAEDIC FOOT AND ANKLE POST-OP VISIT     Procedure:     ORIF left trimalleolar fracture       Date of surgery:   3/5/21      PLAN  1  Weightbearing Status- WBAT operative extremity  2  DVT prophylaxis- [place]-continue until fully weightbearing on the ankle   3  Continue to elevate 23hrs/day getting up 1x per hour to prevent a blood clot  4  Pain control- OTC pain medication  5  RTC in 6 weeks  6  Xrays needed next visit - no    History of Present Illness:   Chief Complaint: left ankle post op     Flor Garcia is a 39 y o  female who is being seen for post-operative visit for the above procedure  Pain is well controlled and the patient has successfully transitioned to OTC pain medicines  she is taking Heparin for DVT prophylaxis  Patient has been PWB in a CAM boot  Review of Systems:  General- denies fever/chills  Respiratory- denies cough or SOB  Cardio- denies chest pain or palpitations  GI- denies abdominal pain  Musculoskeletal- Negative except noted above  Skin- denies rashes or wounds    Physical Exam:   LMP 07/28/2020 (Exact Date)   General/Constitutional: No apparent distress: well-nourished and well developed  Eyes: normal ocular motion  Lymphatic: No appreciable lymphadenopathy  Respiratory: Non-labored breathing  Vascular: No edema, swelling or tenderness, except as noted in detailed exam   Integumentary: No impressive skin lesions present, except as noted in detailed exam   Neuro: No ataxia or tremors noted  Psych: Normal mood and affect, oriented to person, place and time  Appropriate affect  Musculoskeletal: Normal, except as noted in detailed exam and in HPI  Examination    left        Incision Clean, dry, intact  Sutures Previously removed      Ecchymosis none    Swelling Mild    Sensation Intact to light touch throughout sural, saphenous, superficial peroneal, deep peroneal and medial/lateral plantar nerve distributions  [place]-Mary 5 07 filament (10g) testing deferred  Cardiovascular Brisk capillary refill < 2 seconds,intact DP and PT pulses    Special Tests None      Imaging Studies:   3 views of the left ankle were taken, reviewed and interpreted independently that demonstrate stable fixation of trimalleolar fracture  Reviewed by me personally  Toby Bougie Lachman, MD  Foot & Ankle Surgery   Department of [address] [place] [address]      I personally performed the service  Toby Bougie Lachman, MD    Scribe Attestation    I,:   am acting as a scribe while in the presence of the attending physician :       I,:   personally performed the services described in this documentation    as scribed in my presence :

## 2021-05-20 ENCOUNTER — EVALUATION (OUTPATIENT)
Dept: PHYSICAL THERAPY | Facility: CLINIC | Age: 46
End: 2021-05-20
Payer: COMMERCIAL

## 2021-05-20 DIAGNOSIS — S82.852D CLOSED DISPLACED TRIMALLEOLAR FRACTURE OF LEFT ANKLE WITH ROUTINE HEALING, SUBSEQUENT ENCOUNTER: Primary | ICD-10-CM

## 2021-05-20 PROCEDURE — 97110 THERAPEUTIC EXERCISES: CPT | Performed by: PHYSICAL THERAPIST

## 2021-05-20 PROCEDURE — 97016 VASOPNEUMATIC DEVICE THERAPY: CPT | Performed by: PHYSICAL THERAPIST

## 2021-05-20 NOTE — PROGRESS NOTES
PT Re-Evaluation     Today's date: 2021  Patient name: Alondra Martínez  : 1975  MRN: [id number]  Referring provider: Munira Aranda MD  Dx:   Encounter Diagnosis     ICD-10-CM    1  Closed displaced trimalleolar fracture of left ankle with routine healing, subsequent encounter  S82 [id number]        Start Time: 1500  Stop Time: 1540  Total time in clinic (min): 40 minutes    Assessment  Assessment details: Pt has had a prolonged break from physical therapy, she had a baby on 21  She returns to physical therapy today with significantly improved L ankle AROM, strength, pain, and gait  Her pain remains roughly the same, but she is able to ambulate without the boot with very minimal gait deviations  Her biggest limitation is descending stairs, which correlates with her decreased CKC DF in standing  Pt would benefit from continued physical therapy in order to improve gait and stair negotiation in order to return to PLOF  Impairments: abnormal gait, abnormal or restricted ROM, activity intolerance, impaired balance, impaired physical strength, lacks appropriate home exercise program, pain with function and weight-bearing intolerance  Functional limitations: weight bearing  Symptom irritability: lowUnderstanding of Dx/Px/POC: good   Prognosis: good    Goals  ST weeks  1  Pt will demonstrate independence with HEP  met  2  Pt will improve L ankle A/PROM by at least 10%  met  3  Pt will improve L ankle strength by at least 1/2 grade  met  4  Pt will be able to perform SLS for at least 5s without use of BUE  met    LT weeks  1  Pt will improve L ankle to equal that of the R to return to PLOF  In progress  2  Pt will improve L ankle strength to at least 4+/5  In progress  3  Pt will be able to perform SLS on foam for at least 30s to show improved balance and proprioception  In progress  4  Pt will demonstrate WNL joint mobility to return to PLOF  In progress  5   Pt will ambulate without pain and without gait abnormalities  In progress      Plan  Patient would benefit from: skilled physical therapy  Planned modality interventions: cryotherapy and thermotherapy: hydrocollator packs  Planned therapy interventions: therapeutic exercise, therapeutic activities, stretching, strengthening, patient education, neuromuscular re-education, massage, manual therapy, balance, gait training and home exercise program  Frequency: 2x week  Duration in weeks: 8  Treatment plan discussed with: patient        Subjective Evaluation    History of Present Illness  Date of onset: 2021  Date of surgery: 3/5/2021  Mechanism of injury: surgery and trauma  Mechanism of injury: Pt has not attended therapy since  secondary to giving birth  Since this time, she has been walking without the crutches but still in the CAM boot  Her biggest difficulty is the stairs and only does it one step at a time  She continues to get pain, especially when she is on her feet for too long  Not a recurrent problem   Quality of life: fair    Pain  Current pain ratin  At best pain ratin  At worst pain ratin  Quality: sharp, dull ache and discomfort  Relieving factors: medications and ice  Aggravating factors: standing, walking, stair climbing, running and lifting  Progression: improved    Social Support  Steps to enter house: yes  2  Stairs in house: yes   20  Lives in: multiple-level home    Treatments  Current treatment: immobilization  Patient Goals  Patient goals for therapy: increased strength, independence with ADLs/IADLs, return to sport/leisure activities, return to work, increased motion, decreased pain, decreased edema and improved balance          Objective     Observations   Left Ankle/Foot   Positive for incision       Additional Observation Details  Incision c/d/i    Active Range of Motion   Left Ankle/Foot   Dorsiflexion (ke): 3 degrees   Dorsiflexion (kf): 0 degrees   Inversion: 30 degrees   Eversion: 10 degrees Additional Active Range of Motion Details  CKC DF 4" from wall:  R: 1"  L: 2 5"    Joint Play   Left Ankle/Foot  Hypomobile in the talocrural joint  Strength/Myotome Testing     Left Ankle/Foot   Dorsiflexion: 4  Plantar flexion: 4  Inversion: 4  Eversion: 4    Additional Strength Details  In available range    Swelling   Left Ankle/Foot   Metatarsal heads: 20 2 cm  Figure 8: 49 4 cm  Malleoli: 23 5 cm    General Comments:       Ankle/Foot Comments   Post VND: 49 1cm fiure 8    Precautions: Pregnant, NWB      Manuals 4/1 4/6 4/20 5/20         L ankle PROM  8' 10' dc                                                Neuro Re-Ed             BAPS  20x ea x20 ea          Rockerboard  20x ea x20 ea          Biodex   WB 10"x10                                                              Ther Ex             Ankle 4-way HEP   GTB 20x         Seated heel slides  10"x10 10"x10          Toe crunches  20x x20          Seated HR/TR  20x ea x20 ea Stand 20x ea         Spider walks  20x YTB x20          CKC DF    10"x10                                   Ther Activity                                       Gait Training                                       Modalities             Game Ready 10' 10' 10' 10'

## 2021-05-26 ENCOUNTER — OFFICE VISIT (OUTPATIENT)
Dept: PHYSICAL THERAPY | Facility: CLINIC | Age: 46
End: 2021-05-26
Payer: COMMERCIAL

## 2021-05-26 DIAGNOSIS — S82.852D CLOSED DISPLACED TRIMALLEOLAR FRACTURE OF LEFT ANKLE WITH ROUTINE HEALING, SUBSEQUENT ENCOUNTER: Primary | ICD-10-CM

## 2021-05-26 PROCEDURE — 97110 THERAPEUTIC EXERCISES: CPT

## 2021-05-26 PROCEDURE — 97112 NEUROMUSCULAR REEDUCATION: CPT

## 2021-05-26 NOTE — PROGRESS NOTES
Daily Note     Today's date: 2021  Patient name: Saad Richardson  : 1975  MRN: [id number]  Referring provider: Tara Michele MD  Dx:   Encounter Diagnosis     ICD-10-CM    1  Closed displaced trimalleolar fracture of left ankle with routine healing, subsequent encounter  V61 [id number]                   Subjective: Patient offers no new complaints upon arrival to session  Objective: See treatment diary below      Assessment: Patient presenting with improved proprioception with seated NRE   ROM is painfree at this time  Patient ambulating with minimal gait deviations at this time  Plan: Continue per plan of care        Precautions: post partum (21), WBAT in sneaker, NO BALANCE/CARDIO      Manuals         L ankle PROM  8' 10' dc 8'                                               Neuro Re-Ed             BAPS  20x ea x20 ea  x20 ea        Rockerboard  20x ea x20 ea  x20 ea        Biodex   WB 10"x10  WB 10"x10                                                            Ther Ex             Ankle 4-way HEP   GTB 20x GTB x20        Seated heel slides  10"x10 10"x10          Toe crunches  20x x20          Seated HR/TR  20x ea x20 ea Stand 20x ea Stand x20 ea        Spider walks  20x YTB x20          CKC DF    10"x10 10"x10        Standing calf stretch     30"x3                     Ther Activity                                       Gait Training                                       Modalities             Game Ready 10' 10' 10' 10'

## 2021-05-28 ENCOUNTER — APPOINTMENT (OUTPATIENT)
Dept: PHYSICAL THERAPY | Facility: CLINIC | Age: 46
End: 2021-05-28
Payer: COMMERCIAL

## 2021-06-01 ENCOUNTER — TELEPHONE (OUTPATIENT)
Dept: OBGYN CLINIC | Facility: CLINIC | Age: 46
End: 2021-06-01

## 2021-06-01 NOTE — TELEPHONE ENCOUNTER
Returned pts' p c - informed pt, most likely is her period, especially since she is not breast feeding, & blding has been since last Wednesday   -informed pt first period after delivery is usually heavier & may last longer  Pt verbalized understanding  Advised to call with any further questions/concerns

## 2021-06-01 NOTE — TELEPHONE ENCOUNTER
----- Message from Raphael Stubbs sent at 6/1/2021  3:03 PM EDT -----  Regarding: Non-Urgent Medical Question  Contact: [phone]  Hi,    As you know, I had my baby on April 21st   I wasn't bleeding anymore for at least 2 weeks until last Wednesday it started again  I wanted to know if this is considered my period? ? I am still bleeding today  My normal periods are usually around 6 days

## 2021-06-02 ENCOUNTER — OFFICE VISIT (OUTPATIENT)
Dept: PHYSICAL THERAPY | Facility: CLINIC | Age: 46
End: 2021-06-02
Payer: COMMERCIAL

## 2021-06-02 DIAGNOSIS — S82.852D CLOSED DISPLACED TRIMALLEOLAR FRACTURE OF LEFT ANKLE WITH ROUTINE HEALING, SUBSEQUENT ENCOUNTER: Primary | ICD-10-CM

## 2021-06-02 PROCEDURE — 97110 THERAPEUTIC EXERCISES: CPT

## 2021-06-02 PROCEDURE — 97112 NEUROMUSCULAR REEDUCATION: CPT

## 2021-06-02 NOTE — PROGRESS NOTES
Daily Note     Today's date: 2021  Patient name: Karishma Werner  : 1975  MRN: [id number]  Referring provider: Ely Kat MD  Dx:   Encounter Diagnosis     ICD-10-CM    1  Closed displaced trimalleolar fracture of left ankle with routine healing, subsequent encounter  R35 [id number]                   Subjective: Patient reports she does feel anterior L ankle pain with step downs  Objective: See treatment diary below      Assessment: Patient has difficulty with eccentric lowering of LLE secondary to lack of CKC DF  Pt progressing well with OKC resisted exercises  Less hip compensation during seated BAPS  Still unable to transition to balance and proprioceptive NRE at this time due to  precautions  Plan: Continue per plan of care        Precautions: post partum (21), WBAT in sneaker, NO BALANCE/CARDIO      Manuals        L ankle PROM  8' 10' dc 8'                                               Neuro Re-Ed             BAPS  20x ea x20 ea  x20 ea x20 ea       Rockerboard  20x ea x20 ea  x20 ea x20 ea       Biodex   WB 10"x10  WB 10"x10                                                            Ther Ex             Ankle 4-way HEP   GTB 20x GTB x20 GTB x30       Seated heel slides  10"x10 10"x10          Toe crunches  20x x20          Seated HR/TR  20x ea x20 ea Stand 20x ea Stand x20 ea Stand x20 ea       Spider walks  20x YTB x20          CKC DF    10"x10 10"x10 10"x10       Standing calf stretch     30"x3 30"x3       Step down      4" x20       Ther Activity                                       Gait Training                                       Modalities             Game Ready 10' 10' 10' 10'  10' CP

## 2021-06-08 ENCOUNTER — APPOINTMENT (OUTPATIENT)
Dept: PHYSICAL THERAPY | Facility: CLINIC | Age: 46
End: 2021-06-08
Payer: COMMERCIAL

## 2021-06-10 ENCOUNTER — APPOINTMENT (OUTPATIENT)
Dept: PHYSICAL THERAPY | Facility: CLINIC | Age: 46
End: 2021-06-10
Payer: COMMERCIAL

## 2021-06-15 ENCOUNTER — OFFICE VISIT (OUTPATIENT)
Dept: PHYSICAL THERAPY | Facility: CLINIC | Age: 46
End: 2021-06-15
Payer: COMMERCIAL

## 2021-06-15 DIAGNOSIS — S82.852D CLOSED DISPLACED TRIMALLEOLAR FRACTURE OF LEFT ANKLE WITH ROUTINE HEALING, SUBSEQUENT ENCOUNTER: Primary | ICD-10-CM

## 2021-06-15 PROCEDURE — 97110 THERAPEUTIC EXERCISES: CPT | Performed by: PHYSICAL THERAPIST

## 2021-06-15 NOTE — PROGRESS NOTES
Daily Note     Today's date: 6/15/2021  Patient name: Peyton Orozco  : 1975  MRN: [id number]  Referring provider: Simi Gomez MD  Dx:   Encounter Diagnosis     ICD-10-CM    1  Closed displaced trimalleolar fracture of left ankle with routine healing, subsequent encounter  S82 [id number]        Start Time: 5071  Stop Time: 9707  Total time in clinic (min): 54 minutes    Subjective: Pt offers no new complaints today  Objective: See treatment diary below      Assessment: Minimal difficulty with stepping down and contacting with heel first, due to decreased CKC ROM compensated by hip extension  Able to walk on TM without gait abnormalities and without pain  Plan: Progress as tolerated       Precautions: post partum (21), WBAT in sneaker, NO BALANCE/CARDIO      Manuals 4/1 4/6 4/20 5/20 5/26 6/2 6/15      L ankle PROM  8' 10' dc 8'                                               Neuro Re-Ed             BAPS  20x ea x20 ea  x20 ea x20 ea       Rockerboard  20x ea x20 ea  x20 ea x20 ea       Biodex   WB 10"x10  WB 10"x10                                                            Ther Ex             TM       5'      Ankle 4-way HEP   GTB 20x GTB x20 GTB x30 GTB 30x      Seated heel slides  10"x10 10"x10          Toe crunches  20x x20          Seated HR/TR  20x ea x20 ea Stand 20x ea Stand x20 ea Stand x20 ea Stand 20x ea      Spider walks  20x YTB x20          CKC DF    10"x10 10"x10 10"x10 10"x10      Standing calf stretch     30"x3 30"x3 30"x3      Step down      4" x20 4"x20      Ther Activity                                       Gait Training                                       Modalities             Game Ready 10' 10' 10' 10'  10' CP 10'

## 2021-06-17 ENCOUNTER — APPOINTMENT (OUTPATIENT)
Dept: PHYSICAL THERAPY | Facility: CLINIC | Age: 46
End: 2021-06-17
Payer: COMMERCIAL

## 2021-06-22 ENCOUNTER — APPOINTMENT (OUTPATIENT)
Dept: PHYSICAL THERAPY | Facility: CLINIC | Age: 46
End: 2021-06-22
Payer: COMMERCIAL

## 2021-06-24 ENCOUNTER — APPOINTMENT (OUTPATIENT)
Dept: PHYSICAL THERAPY | Facility: CLINIC | Age: 46
End: 2021-06-24
Payer: COMMERCIAL

## 2021-06-29 ENCOUNTER — OFFICE VISIT (OUTPATIENT)
Dept: PHYSICAL THERAPY | Facility: CLINIC | Age: 46
End: 2021-06-29
Payer: COMMERCIAL

## 2021-06-29 DIAGNOSIS — S82.852D CLOSED DISPLACED TRIMALLEOLAR FRACTURE OF LEFT ANKLE WITH ROUTINE HEALING, SUBSEQUENT ENCOUNTER: Primary | ICD-10-CM

## 2021-06-29 PROCEDURE — 97110 THERAPEUTIC EXERCISES: CPT | Performed by: PHYSICAL THERAPIST

## 2021-06-29 NOTE — PROGRESS NOTES
PT Discharge    Today's date: 2021  Patient name: Dean Macdonald  : 1975  MRN: [id number]  Referring provider: Daved Cockayne, MD  Dx:   Encounter Diagnosis     ICD-10-CM    1  Closed displaced trimalleolar fracture of left ankle with routine healing, subsequent encounter  S82 [id number]        Start Time: 7552  Stop Time: 1708  Total time in clinic (min): 34 minutes    Assessment  Assessment details: Pt's AROM and strength have returned to WNL  Her swelling is increased today, likely due to her increased activity  She does not have many functional difficulties, just prolonged walking and descending higher stairs  Pt has reached a plateau with physical therapy and she is agreeable to DC to HEP  Impairments: abnormal gait, abnormal or restricted ROM, activity intolerance, impaired balance, impaired physical strength, lacks appropriate home exercise program, pain with function and weight-bearing intolerance  Functional limitations: descending stairs  Symptom irritability: lowUnderstanding of Dx/Px/POC: good   Prognosis: good    Goals  ST weeks  1  Pt will demonstrate independence with HEP  met  2  Pt will improve L ankle A/PROM by at least 10%  met  3  Pt will improve L ankle strength by at least 1/2 grade  met  4  Pt will be able to perform SLS for at least 5s without use of BUE  met    LT weeks  1  Pt will improve L ankle to equal that of the R to return to PLOF  met  2  Pt will improve L ankle strength to at least 4+/5  met  3  Pt will be able to perform SLS on foam for at least 30s to show improved balance and proprioception  met  4  Pt will demonstrate WNL joint mobility to return to PLOF  met  5  Pt will ambulate without pain and without gait abnormalities   met      Plan  Patient would benefit from: skilled physical therapy  Planned modality interventions: cryotherapy and thermotherapy: hydrocollator packs  Planned therapy interventions: therapeutic exercise, therapeutic activities, stretching, strengthening, patient education, neuromuscular re-education, massage, manual therapy, balance, gait training and home exercise program  Treatment plan discussed with: patient        Subjective Evaluation    History of Present Illness  Date of onset: 2021  Date of surgery: 3/5/2021  Mechanism of injury: surgery and trauma  Mechanism of injury: Pt reports lately her foot has been swelling more than usual  She reports that she has had to ice her ankle more and rest more lately because of this  Pt reports she is getting better with going down the stairs, but when steps are higher she does not go down like normal            Not a recurrent problem   Quality of life: fair    Pain  Current pain ratin  At best pain ratin  At worst pain ratin  Quality: sharp, dull ache and discomfort  Relieving factors: medications and ice  Aggravating factors: standing, walking, stair climbing, running and lifting  Progression: improved    Social Support  Steps to enter house: yes  2  Stairs in house: yes   20  Lives in: multiple-level home    Treatments  Current treatment: immobilization  Patient Goals  Patient goals for therapy: increased strength, independence with ADLs/IADLs, return to sport/leisure activities, decreased pain and decreased edema          Objective     Observations   Left Ankle/Foot   Positive for incision       Additional Observation Details  Incision c/d/i    Active Range of Motion   Left Ankle/Foot   Dorsiflexion (ke): 8 degrees   Dorsiflexion (kf): 0 degrees   Inversion: 35 degrees   Eversion: 15 degrees     Additional Active Range of Motion Details  CKC DF 4" from wall:  R: 1"  L: 1 75"    Strength/Myotome Testing     Left Ankle/Foot   Dorsiflexion: 4+  Plantar flexion: 4+  Inversion: 4+  Eversion: 4+    Swelling   Left Ankle/Foot   Metatarsal heads: 20 2 cm  Figure 8: 51 cm  Malleoli: 23 5 cm    Precautions: post partum (21), WBAT in sneaker, NO BALANCE/CARDIO      Manuals  4/20 5/20 5/26 6/2 6/15 6/29     L ankle PROM  8' 10' dc 8'                                               Neuro Re-Ed             BAPS  20x ea x20 ea  x20 ea x20 ea       Rockerboard  20x ea x20 ea  x20 ea x20 ea       Biodex   WB 10"x10  WB 10"x10                                                            Ther Ex             TM       5'      Ankle 4-way HEP   GTB 20x GTB x20 GTB x30 GTB 30x      Seated heel slides  10"x10 10"x10          Toe crunches  20x x20          Seated HR/TR  20x ea x20 ea Stand 20x ea Stand x20 ea Stand x20 ea Stand 20x ea      Spider walks  20x YTB x20          CKC DF    10"x10 10"x10 10"x10 10"x10      Standing calf stretch     30"x3 30"x3 30"x3      Step down      4" x20 4"x20      Ther Activity                                       Gait Training                                       Modalities             Game Ready 10' 10' 10' 10'  10' CP 10'      HEP education        10'

## 2021-07-09 ENCOUNTER — APPOINTMENT (OUTPATIENT)
Dept: RADIOLOGY | Facility: AMBULARY SURGERY CENTER | Age: 46
End: 2021-07-09
Payer: COMMERCIAL

## 2021-07-09 ENCOUNTER — OFFICE VISIT (OUTPATIENT)
Dept: OBGYN CLINIC | Facility: CLINIC | Age: 46
End: 2021-07-09
Payer: COMMERCIAL

## 2021-07-09 VITALS
SYSTOLIC BLOOD PRESSURE: 94 MMHG | WEIGHT: 173.8 LBS | BODY MASS INDEX: 30.79 KG/M2 | HEART RATE: 89 BPM | DIASTOLIC BLOOD PRESSURE: 70 MMHG

## 2021-07-09 DIAGNOSIS — Z98.890 HISTORY OF ANKLE SURGERY: Primary | ICD-10-CM

## 2021-07-09 DIAGNOSIS — Z98.890 HISTORY OF ANKLE SURGERY: ICD-10-CM

## 2021-07-09 PROCEDURE — 73610 X-RAY EXAM OF ANKLE: CPT

## 2021-07-09 PROCEDURE — 99213 OFFICE O/P EST LOW 20 MIN: CPT | Performed by: ORTHOPAEDIC SURGERY

## 2021-07-09 RX ORDER — BUPROPION HYDROCHLORIDE 300 MG/1
300 TABLET ORAL DAILY
COMMUNITY
Start: 2021-05-19

## 2021-07-09 NOTE — PROGRESS NOTES
MELIZA Stanford  Attending, Orthopaedic Surgery  Foot and Ankle  [facility]      ORTHOPAEDIC FOOT AND ANKLE POST-OP VISIT     Procedure:     ORIF left trimalleolar fracture       Date of surgery:   3/5/21      PLAN  1  Weightbearing Status- WBAT operative extremity  2  DVT prophylaxis- completed   3  Continue to elevate to help with swelling  4  Pain control- OTC pain medication  5  RTC in three months   6  Xrays needed next visit - yes    History of Present Illness:   Chief Complaint: left ankle post op     Angle Arambula is a 39 y o  female who is being seen for post-operative visit for the above procedure  Pain is well controlled and the patient has successfully transitioned to full weight bearing in normal shoe wear  Review of Systems:  General- denies fever/chills  Respiratory- denies cough or SOB  Cardio- denies chest pain or palpitations  GI- denies abdominal pain  Musculoskeletal- Negative except noted above  Skin- denies rashes or wounds    Physical Exam:   BP 94/70   Pulse 89   Wt 78 8 kg (173 lb 12 8 oz)   BMI 30 79 kg/m²   General/Constitutional: No apparent distress: well-nourished and well developed  Eyes: normal ocular motion  Lymphatic: No appreciable lymphadenopathy  Respiratory: Non-labored breathing  Vascular: No edema, swelling or tenderness, except as noted in detailed exam   Integumentary: No impressive skin lesions present, except as noted in detailed exam   Neuro: No ataxia or tremors noted  Psych: Normal mood and affect, oriented to person, place and time  Appropriate affect  Musculoskeletal: Normal, except as noted in detailed exam and in HPI  Examination    left        Incision Clean, dry, intact, mild scar hypertrophy present   Sutures Previously removed      Ecchymosis none    Swelling Mild    Sensation Intact to light touch throughout sural, saphenous, superficial peroneal, deep peroneal and medial/lateral plantar nerve distributions  [place]-Mary 5 07 filament (10g) testing deferred  Cardiovascular Brisk capillary refill < 2 seconds,intact DP and PT pulses    Special Tests None      Imaging Studies:   L ankle films reviewed which shows stable appearance of the implanted hardware  Reviewed by me personally  Larene Frames Lachman, MD  Foot & Ankle Surgery   Department of [address] [place] [address]      I personally performed the service  Larene Frames Lachman, MD

## 2021-08-10 ENCOUNTER — ANNUAL EXAM (OUTPATIENT)
Dept: OBGYN CLINIC | Facility: CLINIC | Age: 46
End: 2021-08-10
Payer: COMMERCIAL

## 2021-08-10 VITALS — BODY MASS INDEX: 30.5 KG/M2 | SYSTOLIC BLOOD PRESSURE: 124 MMHG | WEIGHT: 172.2 LBS | DIASTOLIC BLOOD PRESSURE: 80 MMHG

## 2021-08-10 DIAGNOSIS — Z01.419 ENCOUNTER FOR ANNUAL ROUTINE GYNECOLOGICAL EXAMINATION: Primary | ICD-10-CM

## 2021-08-10 DIAGNOSIS — Z12.31 SCREENING MAMMOGRAM, ENCOUNTER FOR: ICD-10-CM

## 2021-08-10 PROBLEM — O09.813: Status: RESOLVED | Noted: 2020-10-12 | Resolved: 2021-08-10

## 2021-08-10 PROBLEM — O09.529 ADVANCED MATERNAL AGE IN MULTIGRAVIDA, UNSPECIFIED TRIMESTER: Status: RESOLVED | Noted: 2020-09-23 | Resolved: 2021-08-10

## 2021-08-10 PROBLEM — O34.219 HISTORY OF CESAREAN DELIVERY AFFECTING PREGNANCY: Status: RESOLVED | Noted: 2020-09-23 | Resolved: 2021-08-10

## 2021-08-10 PROBLEM — O09.819 PREGNANCY RESULTING FROM IN VITRO FERTILIZATION, ANTEPARTUM: Status: RESOLVED | Noted: 2020-09-23 | Resolved: 2021-08-10

## 2021-08-10 PROBLEM — O24.414 INSULIN CONTROLLED WHITE CLASSIFICATION A2 GESTATIONAL DIABETES MELLITUS (GDM): Status: RESOLVED | Noted: 2021-03-04 | Resolved: 2021-08-10

## 2021-08-10 PROCEDURE — 99396 PREV VISIT EST AGE 40-64: CPT | Performed by: OBSTETRICS & GYNECOLOGY

## 2021-08-10 NOTE — PATIENT INSTRUCTIONS
Wellness Visit for Adults   WHAT YOU NEED TO KNOW:   What is a wellness visit? A wellness visit is when you see your healthcare provider to get screened for health problems  Your healthcare provider will also give you advice on how to stay healthy  Write down your questions so you remember to ask them  Ask your healthcare provider how often you should have a wellness visit  What happens at a wellness visit? Your healthcare provider will ask about your health, and your family history of health problems  This includes high blood pressure, heart disease, and cancer  He or she will ask if you have symptoms that concern you, if you smoke, and about your mood  You may also be asked about your intake of medicines, supplements, food, and alcohol  Any of the following may be done:  · Your weight  will be checked  Your height may also be checked so your body mass index (BMI) can be calculated  Your BMI shows if you are at a healthy weight  · Your blood pressure  and heart rate will be checked  Your temperature may also be checked  · Blood and urine tests  may be done  Blood tests may be done to check your cholesterol levels  Abnormal cholesterol levels increase your risk for heart disease and stroke  You may also need a blood or urine test to check for diabetes if you are at increased risk  Urine tests may be done to look for signs of an infection or kidney disease  · A physical exam  includes checking your heartbeat and lungs with a stethoscope  Your healthcare provider may also check your skin to look for sun damage  · Screening tests  may be recommended  A screening test is done to check for diseases that may not cause symptoms  The screening tests you may need depend on your age, gender, family history, and lifestyle habits  For example, colorectal screening may be recommended if you are 48years old or older  What screening tests do I need if I am a woman?    · A Pap smear  is used to screen for cervical cancer  Pap smears are usually done every 3 to 5 years depending on your age  You may need them more often if you have had abnormal Pap smear test results in the past  Ask your healthcare provider how often you should have a Pap smear  · A mammogram  is an x-ray of your breasts to screen for breast cancer  Experts recommend mammograms every 2 years starting at age 48 years  You may need a mammogram at age 52 years or younger if you have an increased risk for breast cancer  Talk to your healthcare provider about when you should start having mammograms and how often you need them  What vaccines might I need? · Get an influenza vaccine  every year  The influenza vaccine protects you from the flu  Several types of viruses cause the flu  The viruses change over time, so new vaccines are made each year  · Get a tetanus-diphtheria (Td) booster vaccine  every 10 years  This vaccine protects you against tetanus and diphtheria  Tetanus is a severe infection that may cause painful muscle spasms and lockjaw  Diphtheria is a severe bacterial infection that causes a thick covering in the back of your mouth and throat  · Get a human papillomavirus (HPV) vaccine  if you are female and aged 23 to 32 or male 23 to 24 and never received it  This vaccine protects you from HPV infection  HPV is the most common infection spread by sexual contact  HPV may also cause vaginal, penile, and anal cancers  · Get a pneumococcal vaccine  if you are aged 72 years or older  The pneumococcal vaccine is an injection given to protect you from pneumococcal disease  Pneumococcal disease is an infection caused by pneumococcal bacteria  The infection may cause pneumonia, meningitis, or an ear infection  · Get a shingles vaccine  if you are 60 or older, even if you have had shingles before  The shingles vaccine is an injection to protect you from the varicella-zoster virus  This is the same virus that causes chickenpox   Shingles is a painful rash that develops in people who had chickenpox or have been exposed to the virus  How can I eat healthy? My Plate is a model for planning healthy meals  It shows the types and amounts of foods that should go on your plate  Fruits and vegetables make up about half of your plate, and grains and protein make up the other half  A serving of dairy is included on the side of your plate  The amount of calories and serving sizes you need depends on your age, gender, weight, and height  Examples of healthy foods are listed below:  · Eat a variety of vegetables  such as dark green, red, and orange vegetables  You can also include canned vegetables low in sodium (salt) and frozen vegetables without added butter or sauces  · Eat a variety of fresh fruits , canned fruit in 100% juice, frozen fruit, and dried fruit  · Include whole grains  At least half of the grains you eat should be whole grains  Examples include whole-wheat bread, wheat pasta, brown rice, and whole-grain cereals such as oatmeal     · Eat a variety of protein foods such as seafood (fish and shellfish), lean meat, and poultry without skin (turkey and chicken)  Examples of lean meats include pork leg, shoulder, or tenderloin, and beef round, sirloin, tenderloin, and extra lean ground beef  Other protein foods include eggs and egg substitutes, beans, peas, soy products, nuts, and seeds  · Choose low-fat dairy products such as skim or 1% milk or low-fat yogurt, cheese, and cottage cheese  · Limit unhealthy fats  such as butter, hard margarine, and shortening  How much exercise do I need? Exercise at least 30 minutes per day on most days of the week  Some examples of exercise include walking, biking, dancing, and swimming  You can also fit in more physical activity by taking the stairs instead of the elevator or parking farther away from stores  Include muscle strengthening activities 2 days each week   Regular exercise provides many health benefits  It helps you manage your weight, and decreases your risk for type 2 diabetes, heart disease, stroke, and high blood pressure  Exercise can also help improve your mood  Ask your healthcare provider about the best exercise plan for you  What are some general health and safety guidelines I should follow? · Do not smoke  Nicotine and other chemicals in cigarettes and cigars can cause lung damage  Ask your healthcare provider for information if you currently smoke and need help to quit  E-cigarettes or smokeless tobacco still contain nicotine  Talk to your healthcare provider before you use these products  · Limit alcohol  A drink of alcohol is 12 ounces of beer, 5 ounces of wine, or 1½ ounces of liquor  · Lose weight, if needed  Being overweight increases your risk of certain health conditions  These include heart disease, high blood pressure, type 2 diabetes, and certain types of cancer  · Protect your skin  Do not sunbathe or use tanning beds  Use sunscreen with a SPF 15 or higher  Apply sunscreen at least 15 minutes before you go outside  Reapply sunscreen every 2 hours  Wear protective clothing, hats, and sunglasses when you are outside  · Drive safely  Always wear your seatbelt  Make sure everyone in your car wears a seatbelt  A seatbelt can save your life if you are in an accident  Do not use your cell phone when you are driving  This could distract you and cause an accident  Pull over if you need to make a call or send a text message  · Practice safe sex  Use latex condoms if are sexually active and have more than one partner  Your healthcare provider may recommend screening tests for sexually transmitted infections (STIs)  · Wear helmets, lifejackets, and protective gear  Always wear a helmet when you ride a bike or motorcycle, go skiing, or play sports that could cause a head injury  Wear protective equipment when you play sports   Wear a lifejacket when you are on a boat or doing water sports  CARE AGREEMENT:   You have the right to help plan your care  Learn about your health condition and how it may be treated  Discuss treatment options with your healthcare providers to decide what care you want to receive  You always have the right to refuse treatment  The above information is an  only  It is not intended as medical advice for individual conditions or treatments  Talk to your doctor, nurse or pharmacist before following any medical regimen to see if it is safe and effective for you  © Copyright [employer] 2021 Information is for End User's use only and may not be sold, redistributed or otherwise used for commercial purposes   All illustrations and images included in CareNotes® are the copyrighted property of A D A M , Inc  or [address] [place] [address]

## 2021-08-10 NOTE — ASSESSMENT & PLAN NOTE
Pap/HPV current  Mammogram - wait 6 months postpartum then mammogram can be completed    H/o GDM - has had screening labs with PCP    Encourage healthy diet, exercise, Calcium 1200mg per day and at least 800 iu Vitamin D daily

## 2021-08-10 NOTE — PROGRESS NOTES
Assessment/Plan:    Encounter for annual routine gynecological examination  Pap/HPV current  Mammogram - wait 6 months postpartum then mammogram can be completed    H/o GDM - has had screening labs with PCP    Encourage healthy diet, exercise, Calcium 1200mg per day and at least 800 iu Vitamin D daily  Diagnoses and all orders for this visit:    Encounter for annual routine gynecological examination    Screening mammogram, encounter for  -     Mammo screening bilateral w 3d & cad; Future          Subjective:      Patient ID: Nicole Cheek is a 39 y o  female  Patient presents for a routine annual visit  Menarche- 15 Y/O  Last Pap Smear- 8/6/19 Neg-HPV  LMP-7/23  Birth control-  Mammogram-9/10/19 WNL  [id number]  Non smoker  Social drinker  Currently sexually active  No family history of uterine, ovarian, cervical or breast cancer  Patient would like to know when she should have her mammo as she just had a baby in April  PPD=6    Overall feeling well - limited exercise due to continued healing of left ankle fracture  Family Is healthy son is 1 months old  Menses regular  Some hot flashes, loss of hair  Unclear if recovery from pregnancy/perimenopause or both  Continue to monitor  Gynecologic Exam  The patient's pertinent negatives include no genital itching, genital lesions, genital odor, genital rash, pelvic pain, vaginal bleeding or vaginal discharge  The patient is experiencing no pain  Pertinent negatives include no chills, constipation, diarrhea, fever, frequency, nausea, painful intercourse, sore throat, urgency or vomiting  She is sexually active  Her menstrual history has been regular         The following portions of the patient's history were reviewed and updated as appropriate:   She  has a past medical history of Abnormal Pap smear of cervix (2008), Anemia, Anxiety, Gestational diabetes, HPV (human papilloma virus) infection, Insulin controlled White classification A2 gestational diabetes mellitus (GDM) (3/4/2021), Miscarriage, and Varicella  She   Patient Active Problem List    Diagnosis Date Noted    Encounter for annual routine gynecological examination 08/10/2021    Closed displaced trimalleolar fracture of left lower leg 2021    Anxiety associated with depression 2020     She  has a past surgical history that includes Bunionectomy (Bilateral, ); Bunionectomy (Right, );  section; Other surgical history; pr open tx trimalleolar ankle fx w fix pst lip (Left, 3/5/2021); and pr  delivery only (N/A, 2021)  Her family history includes Diabetes in her maternal grandfather, maternal grandmother, and mother; Heart disease in her paternal grandfather; Hypertension in her maternal grandmother and mother; No Known Problems in her daughter, sister, and son; Parkinsonism in her paternal grandmother; Prostate cancer in her father; Stroke in her father and paternal grandfather; Thalassemia in her father and sister  She  reports that she has never smoked  She has never used smokeless tobacco  She reports previous alcohol use  She reports that she does not use drugs  Current Outpatient Medications   Medication Sig Dispense Refill    buPROPion (WELLBUTRIN XL) 150 mg 24 hr tablet Take 150 mg by mouth daily at bedtime Takes a total 450 mg every night      buPROPion (WELLBUTRIN XL) 300 mg 24 hr tablet Take 300 mg by mouth daily      cholecalciferol (VITAMIN D3) 400 units tablet Take 400 Units by mouth daily      citalopram (CeleXA) 40 mg tablet Take 40 mg by mouth daily at bedtime   1    ferrous gluconate (FERGON) 324 mg tablet Take 324 mg by mouth daily with breakfast      Accu-Chek Softclix Lancets lancets Test 4 times per day  Gestational diabetes   (Patient not taking: Reported on 5/10/2021) 100 each 4    acetaminophen (TYLENOL) 325 mg tablet Take 2 tablets (650 mg total) by mouth every 6 (six) hours (Patient not taking: Reported on 5/10/2021) 30 tablet 0    Blood Glucose Monitoring Suppl (Accu-Chek Guide Me) w/Device KIT Test 4 times per day  Gestational diabetes (Patient not taking: Reported on 5/10/2021) 1 kit 0    docusate sodium (COLACE) 100 mg capsule Take 1 capsule (100 mg total) by mouth 2 (two) times a day (Patient not taking: Reported on 5/10/2021) 10 capsule 0    ibuprofen (MOTRIN) 600 mg tablet Take 1 tablet (600 mg total) by mouth every 6 (six) hours (Patient not taking: Reported on 5/10/2021) 30 tablet 0    Insulin Pen Needle 31G X 5 MM MISC Use 1 daily  (Patient not taking: Reported on 5/10/2021) 100 each 3     No current facility-administered medications for this visit  Current Outpatient Medications on File Prior to Visit   Medication Sig    buPROPion (WELLBUTRIN XL) 150 mg 24 hr tablet Take 150 mg by mouth daily at bedtime Takes a total 450 mg every night    buPROPion (WELLBUTRIN XL) 300 mg 24 hr tablet Take 300 mg by mouth daily    cholecalciferol (VITAMIN D3) 400 units tablet Take 400 Units by mouth daily    citalopram (CeleXA) 40 mg tablet Take 40 mg by mouth daily at bedtime     ferrous gluconate (FERGON) 324 mg tablet Take 324 mg by mouth daily with breakfast    Accu-Chek Softclix Lancets lancets Test 4 times per day  Gestational diabetes  (Patient not taking: Reported on 5/10/2021)    acetaminophen (TYLENOL) 325 mg tablet Take 2 tablets (650 mg total) by mouth every 6 (six) hours (Patient not taking: Reported on 5/10/2021)    Blood Glucose Monitoring Suppl (Accu-Chek Guide Me) w/Device KIT Test 4 times per day   Gestational diabetes (Patient not taking: Reported on 5/10/2021)    docusate sodium (COLACE) 100 mg capsule Take 1 capsule (100 mg total) by mouth 2 (two) times a day (Patient not taking: Reported on 5/10/2021)    ibuprofen (MOTRIN) 600 mg tablet Take 1 tablet (600 mg total) by mouth every 6 (six) hours (Patient not taking: Reported on 5/10/2021)    Insulin Pen Needle 31G X 5 MM MISC Use 1 daily  (Patient not taking: Reported on 5/10/2021)     No current facility-administered medications on file prior to visit  She has No Known Allergies       Review of Systems   Constitutional: Negative for activity change, appetite change, chills, fatigue and fever  HENT: Negative for rhinorrhea, sneezing and sore throat  Eyes: Negative for visual disturbance  Respiratory: Negative for cough, shortness of breath and wheezing  Cardiovascular: Negative for chest pain, palpitations and leg swelling  Gastrointestinal: Negative for abdominal distention, constipation, diarrhea, nausea and vomiting  Genitourinary: Negative for difficulty urinating, frequency, pelvic pain, urgency and vaginal discharge  Neurological: Negative for syncope and light-headedness  Objective:      /80   Wt 78 1 kg (172 lb 3 2 oz)   LMP 07/23/2021   BMI 30 50 kg/m²          Physical Exam  Chest:      Breasts: Breasts are symmetrical          Right: No inverted nipple, mass, nipple discharge, skin change or tenderness  Left: No inverted nipple, mass, nipple discharge, skin change or tenderness  Genitourinary:     Labia:         Right: No rash, tenderness, lesion or injury  Left: No rash, tenderness, lesion or injury  Vagina: Normal  No vaginal discharge, tenderness or bleeding  Cervix: No cervical motion tenderness, discharge or friability  Uterus: Not deviated, not enlarged, not fixed and not tender  Adnexa:         Right: No mass, tenderness or fullness  Left: No mass, tenderness or fullness  Neurological:      Mental Status: She is alert and oriented to person, place, and time

## 2021-09-10 ENCOUNTER — PATIENT MESSAGE (OUTPATIENT)
Dept: OBGYN CLINIC | Facility: CLINIC | Age: 46
End: 2021-09-10

## 2021-10-01 ENCOUNTER — HOSPITAL ENCOUNTER (OUTPATIENT)
Dept: ULTRASOUND IMAGING | Facility: CLINIC | Age: 46
Discharge: HOME | End: 2021-10-01
Payer: COMMERCIAL

## 2021-10-01 DIAGNOSIS — R10.2 PELVIC AND PERINEAL PAIN: ICD-10-CM

## 2021-10-01 PROCEDURE — 76856 US EXAM PELVIC COMPLETE: CPT

## 2021-10-01 PROCEDURE — 76830 TRANSVAGINAL US NON-OB: CPT

## 2021-10-05 NOTE — LETTER
April 9, 2021     Ulysses Slipper, [address] [place] [address]    Patient: Avinash Perkins   YOB: 1975   Date of Visit: 4/9/2021       Dear Dr Bryon Holliday: Thank you for referring Michael Diaz to me for evaluation  Below are my notes for this consultation  If you have questions, please do not hesitate to call me  I look forward to following your patient along with you  Sincerely,        Leonie Maki MD        CC: No Recipients  Leonie Maki MD  4/9/2021  8:16 AM  Sign when Signing Visit   Please refer to the [facility] ultrasound report in Ob Procedures for additional information regarding today's visit  minimum assist (75% patients effort)

## 2021-10-26 NOTE — ADDENDUM NOTE
Addended by: Mike Saavedra on: 2/3/2021 05:04 PM     Modules accepted: Orders
pt does not require further PT intervention @ this time; currently @ baseline functional status; recommend daily OOB / amb with RW as sheila on nursing floor care

## 2021-10-28 ENCOUNTER — TELEPHONE (OUTPATIENT)
Dept: OBGYN CLINIC | Facility: CLINIC | Age: 46
End: 2021-10-28

## 2021-11-02 ENCOUNTER — APPOINTMENT (OUTPATIENT)
Dept: RADIOLOGY | Facility: AMBULARY SURGERY CENTER | Age: 46
End: 2021-11-02
Attending: ORTHOPAEDIC SURGERY
Payer: COMMERCIAL

## 2021-11-02 ENCOUNTER — OFFICE VISIT (OUTPATIENT)
Dept: OBGYN CLINIC | Facility: CLINIC | Age: 46
End: 2021-11-02
Payer: COMMERCIAL

## 2021-11-02 VITALS
BODY MASS INDEX: 30.48 KG/M2 | HEIGHT: 63 IN | SYSTOLIC BLOOD PRESSURE: 109 MMHG | HEART RATE: 78 BPM | DIASTOLIC BLOOD PRESSURE: 75 MMHG | WEIGHT: 172 LBS

## 2021-11-02 DIAGNOSIS — S82.852D CLOSED DISPLACED TRIMALLEOLAR FRACTURE OF LEFT ANKLE WITH ROUTINE HEALING, SUBSEQUENT ENCOUNTER: ICD-10-CM

## 2021-11-02 DIAGNOSIS — M76.72 PERONEAL TENDONITIS OF LEFT LOWER LEG: Primary | ICD-10-CM

## 2021-11-02 PROCEDURE — 73610 X-RAY EXAM OF ANKLE: CPT

## 2021-11-02 PROCEDURE — 99213 OFFICE O/P EST LOW 20 MIN: CPT | Performed by: ORTHOPAEDIC SURGERY

## 2021-11-23 ENCOUNTER — EVALUATION (OUTPATIENT)
Dept: PHYSICAL THERAPY | Facility: CLINIC | Age: 46
End: 2021-11-23
Payer: COMMERCIAL

## 2021-11-23 DIAGNOSIS — M76.72 PERONEAL TENDONITIS OF LEFT LOWER LEG: Primary | ICD-10-CM

## 2021-11-23 PROCEDURE — 97162 PT EVAL MOD COMPLEX 30 MIN: CPT | Performed by: PHYSICAL THERAPIST

## 2021-11-23 PROCEDURE — 97140 MANUAL THERAPY 1/> REGIONS: CPT | Performed by: PHYSICAL THERAPIST

## 2021-11-30 ENCOUNTER — APPOINTMENT (OUTPATIENT)
Dept: PHYSICAL THERAPY | Facility: CLINIC | Age: 46
End: 2021-11-30
Payer: COMMERCIAL

## 2021-12-03 ENCOUNTER — OFFICE VISIT (OUTPATIENT)
Dept: PHYSICAL THERAPY | Facility: CLINIC | Age: 46
End: 2021-12-03
Payer: COMMERCIAL

## 2021-12-03 DIAGNOSIS — M76.72 PERONEAL TENDONITIS OF LEFT LOWER LEG: Primary | ICD-10-CM

## 2021-12-03 PROCEDURE — 97140 MANUAL THERAPY 1/> REGIONS: CPT

## 2021-12-07 ENCOUNTER — OFFICE VISIT (OUTPATIENT)
Dept: PHYSICAL THERAPY | Facility: CLINIC | Age: 46
End: 2021-12-07
Payer: COMMERCIAL

## 2021-12-07 DIAGNOSIS — M76.72 PERONEAL TENDONITIS OF LEFT LOWER LEG: Primary | ICD-10-CM

## 2021-12-07 PROCEDURE — 97110 THERAPEUTIC EXERCISES: CPT

## 2021-12-07 PROCEDURE — 97140 MANUAL THERAPY 1/> REGIONS: CPT

## 2021-12-07 PROCEDURE — 97112 NEUROMUSCULAR REEDUCATION: CPT

## 2021-12-09 ENCOUNTER — APPOINTMENT (OUTPATIENT)
Dept: PHYSICAL THERAPY | Facility: CLINIC | Age: 46
End: 2021-12-09
Payer: COMMERCIAL

## 2021-12-14 ENCOUNTER — APPOINTMENT (OUTPATIENT)
Dept: PHYSICAL THERAPY | Facility: CLINIC | Age: 46
End: 2021-12-14
Payer: COMMERCIAL

## 2021-12-17 ENCOUNTER — APPOINTMENT (OUTPATIENT)
Dept: PHYSICAL THERAPY | Facility: CLINIC | Age: 46
End: 2021-12-17
Payer: COMMERCIAL

## 2021-12-20 ENCOUNTER — TELEPHONE (OUTPATIENT)
Dept: OBGYN CLINIC | Facility: CLINIC | Age: 46
End: 2021-12-20

## 2021-12-20 DIAGNOSIS — R10.2 PELVIC PAIN: Primary | ICD-10-CM

## 2021-12-21 ENCOUNTER — HOSPITAL ENCOUNTER (OUTPATIENT)
Dept: ULTRASOUND IMAGING | Facility: CLINIC | Age: 46
Discharge: HOME | End: 2021-12-21
Attending: OBSTETRICS & GYNECOLOGY
Payer: COMMERCIAL

## 2021-12-21 ENCOUNTER — APPOINTMENT (OUTPATIENT)
Dept: PHYSICAL THERAPY | Facility: CLINIC | Age: 46
End: 2021-12-21
Payer: COMMERCIAL

## 2021-12-21 DIAGNOSIS — R10.2 PELVIC PAIN: ICD-10-CM

## 2021-12-21 PROCEDURE — 76830 TRANSVAGINAL US NON-OB: CPT

## 2021-12-21 PROCEDURE — 76856 US EXAM PELVIC COMPLETE: CPT

## 2021-12-23 ENCOUNTER — APPOINTMENT (OUTPATIENT)
Dept: PHYSICAL THERAPY | Facility: CLINIC | Age: 46
End: 2021-12-23
Payer: COMMERCIAL

## 2021-12-29 ENCOUNTER — TELEPHONE (OUTPATIENT)
Dept: OBGYN CLINIC | Facility: CLINIC | Age: 46
End: 2021-12-29

## 2022-01-04 ENCOUNTER — OFFICE VISIT (OUTPATIENT)
Dept: PHYSICAL THERAPY | Facility: CLINIC | Age: 47
End: 2022-01-04
Payer: COMMERCIAL

## 2022-01-04 DIAGNOSIS — M76.72 PERONEAL TENDONITIS OF LEFT LOWER LEG: Primary | ICD-10-CM

## 2022-01-04 PROCEDURE — 97112 NEUROMUSCULAR REEDUCATION: CPT

## 2022-01-04 PROCEDURE — 97110 THERAPEUTIC EXERCISES: CPT

## 2022-01-04 PROCEDURE — 97140 MANUAL THERAPY 1/> REGIONS: CPT

## 2022-01-04 NOTE — PROGRESS NOTES
Daily Note     Today's date: 2022  Patient name: Emily Fine  : 1975  MRN: [id number]  Referring provider: Felton Osullivan MD  Dx:   Encounter Diagnosis     ICD-10-CM    1  Peroneal tendonitis of left lower leg  M76 72                   Subjective: Pt reports her L ankle swells and is painful when she is on her feet too much  Objective: See treatment diary below      Assessment: Scar tissue restriction along lateral L ankle  Patient lacks eccentric control of LLE  Decreased proprioception remains  Stepping strategy utilized to maintain balance on foam surface  Patient demonstrated L ankle fatigue post session  Plan: Continue per plan of care  Precautions: N/A    Access Code: [email or web address]  URL: [email or web address]      Manuals 11/23 12/3 12/7 1/4         L ant tib IASTM 10' 15' 10'          TFM     8'                                   Neuro Re-Ed  12/3           Seated ecc TR HEP 2x10 2x10          SL rockerboard  patient declined x20          SLS foam  patient declined 10"x5          Biodex LOS   L12 x3 L12 x3         Cone taps    x10         TRX squat on foam    2x10         rebounder toss    2x10         Ther Ex  12/3           Bike  5 min 5 min 5 min         CKC DF c strap   10"x10          ecc step down    2x10         HR c TB at feet    GTB x20         Lateral walking TB at feet    GTB 5 laps                                                Ther Activity                                       Gait Training                                       Modalities             GameReady   10'

## 2022-01-07 ENCOUNTER — EVALUATION (OUTPATIENT)
Dept: PHYSICAL THERAPY | Facility: CLINIC | Age: 47
End: 2022-01-07
Payer: COMMERCIAL

## 2022-01-07 DIAGNOSIS — M76.72 PERONEAL TENDONITIS OF LEFT LOWER LEG: Primary | ICD-10-CM

## 2022-01-07 PROCEDURE — 97110 THERAPEUTIC EXERCISES: CPT | Performed by: PHYSICAL THERAPIST

## 2022-01-07 PROCEDURE — 97140 MANUAL THERAPY 1/> REGIONS: CPT | Performed by: PHYSICAL THERAPIST

## 2022-01-07 PROCEDURE — 97112 NEUROMUSCULAR REEDUCATION: CPT | Performed by: PHYSICAL THERAPIST

## 2022-01-07 PROCEDURE — 97016 VASOPNEUMATIC DEVICE THERAPY: CPT | Performed by: PHYSICAL THERAPIST

## 2022-01-07 NOTE — PROGRESS NOTES
PT Re-Evaluation     Today's date: 2022  Patient name: Tay Banuelos  : 1975  MRN: [id number]  Referring provider: Shayna Orona MD  Dx:   Encounter Diagnosis     ICD-10-CM    1  Peroneal tendonitis of left lower leg  M76 72        Start Time: 1104  Stop Time: 1158  Total time in clinic (min): 54 minutes    Assessment  Assessment details: Since starting physical therapy, pt's L ankle AROM and strength have improved, as well as her function  She continues to have TTP over the peroneal tendons on the L, which is where she feels the pain during functional activities as well  Her swelling was increased today compared to IE, however pt and PT discussed nature of fluctuating swelling  Pt would benefit from continued physical therapy in order to improve pain and function to reach maximal therapeutic potential    Impairments: abnormal or restricted ROM, activity intolerance, impaired balance, impaired physical strength, lacks appropriate home exercise program and pain with function  Functional limitations: getting up from the floor, prolonged walking/standing  Symptom irritability: lowUnderstanding of Dx/Px/POC: good   Prognosis: good    Goals  STG: 3 weeks  1  Pt will demonstrate independence with HEP  met  2  Pt will improve L ankle A/PROM by at least 10%  met  3  Pt will improve L ankle strength by at least 1/2 grade  met  4  Pt will be able to get up and down from the floor with minimal pain  In progress    LT weeks  1  Pt will improve L ankle to equal that of the R to return to PLOF  met  2  Pt will improve L ankle strength to at least 4+/5  met  3  Pt will be able to get up and down from the floor without pain  In progress  4  Pt will be able to walk for 20 min without ankle pain   In progress      Plan  Patient would benefit from: skilled physical therapy  Planned modality interventions: cryotherapy and thermotherapy: hydrocollator packs  Planned therapy interventions: therapeutic exercise, therapeutic activities, stretching, strengthening, patient education, neuromuscular re-education, massage, manual therapy, balance, gait training and home exercise program  Frequency: 2x week  Duration in weeks: 6  Treatment plan discussed with: patient        Subjective Evaluation    History of Present Illness  Mechanism of injury: Pt reports she feels pain most when she is going up and down stairs multiple times during the day, and it is mostly on the outside of the ankle  She has difficulty sitting on the floor because of the pain, and going on her knees as well  Recurrent probem    Quality of life: good    Pain  At best pain ratin  At worst pain ratin  Quality: dull ache, discomfort and sharp  Aggravating factors: walking, standing, stair climbing and running  Progression: no change    Patient Goals  Patient goals for therapy: increased strength, independence with ADLs/IADLs, return to sport/leisure activities, decreased pain, increased motion and decreased edema          Objective     Tenderness   Left Ankle/Foot   Tenderness in the peroneal tendon  Active Range of Motion   Left Ankle/Foot   Dorsiflexion (ke): 12 degrees   Inversion: 32 degrees   Eversion: 16 degrees     Strength/Myotome Testing     Left Ankle/Foot   Normal strength    Right Ankle/Foot   Normal strength    Swelling   Left Ankle/Foot   Metatarsal heads: 21 3 cm  Figure 8: 50 cm  Malleoli: 22 6 cm    General Comments: Ankle/Foot Comments   Post VND: 49cm figure 8      Precautions: N/A    Access Code: [email or web address]  URL: [email or web address]      Manuals 11/23 12/3 12/7 1/4 1/7        L ant tib IASTM 10' 15' 10'          TFM     8' 10'                                  Neuro Re-Ed  12/3           Seated ecc TR HEP 2x10 2x10          SL rockerboard  patient declined x20          SLS foam  patient declined 10"x5          Biodex LOS   L12 x3 L12 x3 L12 3x 1UE        Cone taps    x10 10x foam        TRX squat on foam 2x10         rebounder toss    2x10 SLS firm 20x        Ther Ex  12/3           Bike  5 min 5 min 5 min 5'        CKC DF c strap   10"x10          ecc step down    2x10 6"x20        HR c TB at feet    GTB x20 GTB 20x        Lateral walking TB at feet    GTB 5 laps GTB 5 laps                                               Ther Activity                                       Gait Training                                       Modalities             GameReady   10'  10'

## 2022-01-11 ENCOUNTER — APPOINTMENT (OUTPATIENT)
Dept: PHYSICAL THERAPY | Facility: CLINIC | Age: 47
End: 2022-01-11
Payer: COMMERCIAL

## 2022-01-18 ENCOUNTER — OFFICE VISIT (OUTPATIENT)
Dept: PHYSICAL THERAPY | Facility: CLINIC | Age: 47
End: 2022-01-18
Payer: COMMERCIAL

## 2022-01-18 DIAGNOSIS — M76.72 PERONEAL TENDONITIS OF LEFT LOWER LEG: Primary | ICD-10-CM

## 2022-01-18 PROCEDURE — 97112 NEUROMUSCULAR REEDUCATION: CPT

## 2022-01-18 PROCEDURE — 97110 THERAPEUTIC EXERCISES: CPT

## 2022-01-18 PROCEDURE — 97140 MANUAL THERAPY 1/> REGIONS: CPT

## 2022-01-18 NOTE — PROGRESS NOTES
Daily Note     Today's date: 2022  Patient name: Maru Doherty  : 1975  MRN: [id number]  Referring provider: Darell Hoffmann MD  Dx:   Encounter Diagnosis     ICD-10-CM    1  Peroneal tendonitis of left lower leg  M76 72                   Subjective: Patient reports she experiences increased L foot pain and swelling when navigating stairs too often and getting up and down off the floor  Objective: See treatment diary below      Assessment: Good eccentric control of LLE during step downs  Patient does present with decreased proprioception and has difficulty completing dual task activities in SLS  Distal surgical incision scar tissue restriction is present  Plan: Continue per plan of care  Precautions: N/A    Access Code: [email or web address]  URL: [email or web address]      Manuals 11/23 12/3 12/7 1/4 1/7 1/18       L ant tib IASTM 10' 15' 10'          TFM     8' 10' 10'                                 Neuro Re-Ed  12/3           Seated ecc TR HEP 2x10 2x10          SL rockerboard  patient declined x20          SLS foam  patient declined 10"x5          Biodex LOS   L12 x3 L12 x3 L12 3x 1UE L10 x3 1ue       Cone taps    x10 10x foam        TRX squat on foam    2x10         rebounder toss    2x10 SLS firm 20x SLS firm x20       SLS on wobble board      10"x5       Lunge into BOSU      x20       BOSU step up      x20       Ther Ex  12/3           Bike  5 min 5 min 5 min 5' 5'       CKC DF c strap   10"x10          ecc step down    2x10 6"x20 Bio x20       HR c TB at feet    GTB x20 GTB 20x GTB 2x20       Lateral walking TB at feet    GTB 5 laps GTB 5 laps GTB 5 laps                                              Ther Activity                                       Gait Training                                       Modalities             GameReady   10'  10' 10'

## 2022-01-21 ENCOUNTER — OFFICE VISIT (OUTPATIENT)
Dept: PHYSICAL THERAPY | Facility: CLINIC | Age: 47
End: 2022-01-21
Payer: COMMERCIAL

## 2022-01-21 DIAGNOSIS — M76.72 PERONEAL TENDONITIS OF LEFT LOWER LEG: Primary | ICD-10-CM

## 2022-01-21 PROCEDURE — 97110 THERAPEUTIC EXERCISES: CPT

## 2022-01-21 PROCEDURE — 97112 NEUROMUSCULAR REEDUCATION: CPT

## 2022-01-21 PROCEDURE — 97140 MANUAL THERAPY 1/> REGIONS: CPT

## 2022-01-21 NOTE — PROGRESS NOTES
Daily Note     Today's date: 2022  Patient name: Truddie Fabry  : 1975  MRN: [id number]  Referring provider: Laisha Multani MD  Dx:   Encounter Diagnosis     ICD-10-CM    1  Peroneal tendonitis of left lower leg  M76 72                   Subjective: Pt reports L foot was not as sore over the last few days  Objective: See treatment diary below      Assessment: Patient still lacking proprioception and SLS stability on unstable surfaces  Able to perform charted exercises without c/o pain or discomfort  Stepping strategy utilized to recover from LOB  Good eccentric control on LLE  Plan: Continue per plan of care  Precautions: N/A    Access Code: [email or web address]  URL: [email or web address]      Manuals 11/23 12/3 12/7 1/4 1/7 1/18 1/21      L ant tib IASTM 10' 15' 10'          TFM     8' 10' 10' 13' IASTM                                Neuro Re-Ed  12/3           Seated ecc TR HEP 2x10 2x10          SL rockerboard  patient declined x20          SLS foam  patient declined 10"x5          Biodex LOS   L12 x3 L12 x3 L12 3x 1UE L10 x3 1ue l10 X3 1 ue      Cone taps    x10 10x foam        TRX squat on foam    2x10         rebounder toss    2x10 SLS firm 20x SLS firm x20 NV      SLS on wobble board      10"x5 10"x5      Lunge into BOSU      x20 x20      BOSU step up      x20 x20      Ther Ex  12/3           Bike  5 min 5 min 5 min 5' 5' 5'      CKC DF c strap   10"x10          ecc step down    2x10 6"x20 Bio x20 BIO X20      HR c TB at feet    GTB x20 GTB 20x GTB 2x20 GTB 2x20      Lateral walking TB at feet    GTB 5 laps GTB 5 laps GTB 5 laps                                              Ther Activity                                       Gait Training                                       Modalities             GameReady   10'  10' 10' 10'

## 2022-01-25 ENCOUNTER — TELEPHONE (OUTPATIENT)
Dept: OBGYN CLINIC | Facility: CLINIC | Age: 47
End: 2022-01-25

## 2022-01-25 NOTE — TELEPHONE ENCOUNTER
----- Message from Raphael Stubbs sent at 1/25/2022 10:27 AM EST -----  Regarding: Question   Hi,    I got my period on January 5th  My cycles are very regular (28 days)  The past few days I have been spotting brown but only when I wipe  Its not every time  I wasnt sure how long it should last  FYI, I had a pelvic ultrasound last month

## 2022-01-26 NOTE — TELEPHONE ENCOUNTER
Her ultrasound was normal   This may be a one time occurrence but may last until the onset of her next cycle  Any vulvar irritation? Or dysuria? Any pain with sex? If able to wait, wait until next cycle to see if this resolves  If not recommend office visit  If any indication of vaginitis then sooner visit    Or if suspicion for UTI then urine sample

## 2022-01-27 ENCOUNTER — OFFICE VISIT (OUTPATIENT)
Dept: OBGYN CLINIC | Facility: MEDICAL CENTER | Age: 47
End: 2022-01-27
Payer: COMMERCIAL

## 2022-01-27 VITALS
SYSTOLIC BLOOD PRESSURE: 120 MMHG | BODY MASS INDEX: 31.18 KG/M2 | WEIGHT: 176 LBS | DIASTOLIC BLOOD PRESSURE: 70 MMHG | HEIGHT: 63 IN

## 2022-01-27 DIAGNOSIS — L30.9 VULVAR DERMATITIS: ICD-10-CM

## 2022-01-27 DIAGNOSIS — N89.8 VAGINAL DISCHARGE: Primary | ICD-10-CM

## 2022-01-27 LAB
BV WHIFF TEST VAG QL: NORMAL
CLUE CELLS SPEC QL WET PREP: NORMAL
PH SMN: 4 [PH]
T VAGINALIS VAG QL WET PREP: NORMAL
YEAST VAG QL WET PREP: NORMAL

## 2022-01-27 PROCEDURE — 99213 OFFICE O/P EST LOW 20 MIN: CPT | Performed by: OBSTETRICS & GYNECOLOGY

## 2022-01-27 PROCEDURE — 87210 SMEAR WET MOUNT SALINE/INK: CPT | Performed by: OBSTETRICS & GYNECOLOGY

## 2022-01-27 RX ORDER — IBUPROFEN 800 MG/1
TABLET ORAL
COMMUNITY
Start: 2021-11-11

## 2022-01-27 NOTE — PATIENT INSTRUCTIONS
Perineal Hygiene     No soaps or feminine wash to the vulva  Use only water to cleanse, or water with Dove or [employer] if necessary  No lotion to the area  Use only coconut oil for moisture if needed   No douching     Cotton underware, loose fitting clothing  Only perfume-free, dye-free laundry detergent, use a second rinse cycle   Avoid fabric softeners/dryer sheets  Coconut oil as a lubricant (if not using condoms) or another scent-free lubricant (Astroglide, Uberlube) if needed  Partner to avoid the same products as well  Over the counter probiotic to restore vaginal mouna may be helpful as well     You may also look into Boric Acid vaginal suppositories to restore vaginal PH balance for up to 2 weeks as directed on the box  You may not use these if you are pregnant      Menopause   WHAT YOU NEED TO KNOW:   What is menopause? Menopause is a normal stage in a woman's life when her monthly periods stop  A woman who has not had a period for a full year after the age of 36 is considered to be in menopause  Menopause usually occurs between ages 52 to 48  Perimenopause is a stage before menopause that may cause signs and symptoms similar to menopause  Perimenopause may start about 4 years before menopause  What causes menopause? Menopause starts when the ovaries stop making the female hormones estrogen and progesterone  After menopause, a woman is no longer able to become pregnant  Any of the following may trigger menopause or early menopause:  · Older age    · Surgery, including a hysterectomy or oophorectomy    · Family history of early menopause    · Smoking    · Chemotherapy or pelvic radiation    · Chromosome abnormalities, including Randall syndrome and Fragile X syndrome    What are the signs and symptoms of menopause?   The signs and symptoms of menopause can be different from woman to woman:  · Irregular menstrual cycles with heavy vaginal bleeding followed by decreased bleeding until it stops    · Hot flashes (feeling warm, flushed, and sweaty)     · Vaginal changes such as increased dryness     · Mood changes such as anxiety, depression, or decreased desire to have sex    · Trouble sleeping, joint pain, headaches    · Brittle nails, hair on chin or chest where it is normally absent    · Decrease in breast size and change in skin texture    What do I need to know about menopause? · You can still get pregnant while you have periods  Continue to use birth control if you do not want to get pregnant  You may need to use birth control until it has been 1 year since your periods stopped  · Hormone replacement therapy (HRT) can be used to treat symptoms of menopause  HRT is medicine that replaces your low hormone levels  HRT contains estrogen and sometimes progestin  HRT has benefits and risks  HRT decreases your risk for bone fractures by helping to prevent osteoporosis  HRT also protects you from colon cancer  HRT may increase your risk for breast cancer, blood clots, heart disease, and stroke  Ask your healthcare provider if HRT is right for you  How can I care for myself? · Manage hot flashes  Hot flashes are brief periods of feeling very warm, flushed, and sweaty  Hot flashes can last from a few seconds to several minutes  They may happen many times during the day, and are common at night  Layer your clothing so that you can easily remove some clothing and cool yourself during a hot flash  Cold drinks may also be helpful  · Reduce vaginal dryness  by using over-the-counter vaginal creams  Vaginal dryness may cause you to have pain or discomfort during sex  Only use creams that are made for vaginal use  Do  not  use petroleum jelly  You may need an estrogen cream to put in and around your vagina  Estrogen cream may help decrease vaginal dryness and lower your risk of vaginal infections      · Continue to use birth control  during perimenopause if you do not want to get pregnant  You may need to use birth control until it has been 1 year since your periods stopped  Ask your healthcare provider when you can stop using birth control to prevent pregnancy  How can I live a healthy lifestyle during and after menopause? After menopause, your risk for heart disease and bone loss increases  Ask about these and other ways to stay healthy:  · Exercise regularly  Exercise helps you maintain a healthy weight  Exercise can also help to control your blood pressure and cholesterol levels  Include weight-bearing exercise for strong bones  Weight bearing exercise is recommended for at least 30 minutes, 3 times a week  Ask your healthcare provider about the best exercise plan for you  · Eat a variety of healthy foods  Include fruits, vegetables, whole grains (whole-wheat bread, pasta, and cereals), low-fat dairy, and lean protein foods (beans, poultry, and fish)  Limit foods high in sodium (salt)  Ask your healthcare provider for more information about a meal plan that is right for you  · Do not smoke  If you smoke, it is never too late to quit  You are more likely to have a heart attack, lung disease, blood clots, and cancer if you smoke  Ask your healthcare provider for information if you need help quitting  · Take supplements as directed  You may need extra calcium and vitamin D to help prevent osteoporosis  · Limit alcohol and caffeine  Alcohol and caffeine may worsen your symptoms  When should I contact my healthcare provider? · You have vaginal bleeding after menopause  · You have questions or concerns about your condition or care  CARE AGREEMENT:   You have the right to help plan your care  Learn about your health condition and how it may be treated  Discuss treatment options with your healthcare providers to decide what care you want to receive  You always have the right to refuse treatment  The above information is an  only   It is not intended as medical advice for individual conditions or treatments  Talk to your doctor, nurse or pharmacist before following any medical regimen to see if it is safe and effective for you  © Copyright [employer] 2021 Information is for End User's use only and may not be sold, redistributed or otherwise used for commercial purposes   All illustrations and images included in CareNotes® are the copyrighted property of A D A M , Inc  or [address] [place] [address]

## 2022-01-27 NOTE — PROGRESS NOTES
Assessment/Plan:    No problem-specific Assessment & Plan notes found for this encounter  Diagnoses and all orders for this visit:    Vaginal discharge  -     POCT wet mount    Vulvar dermatitis  -     triamcinolone (KENALOG) 0 1 % ointment; Apply topically 2 (two) times a day          Subjective:      Patient ID: Kisha Phelan is a 55 y o  female  51-year-old female  sexually active presents with complaints of brownish colored vaginal discharge/spotting  Noticed a short time after her last menstrual period  Reports menstrual periods are regular every 28 days  Recent TV U S  normal with no findings  Patient of Dr Juana Louise  She was advised to keep track of her episodes of brown spotting and call if symptoms continued  Patient was worried there was something wrong so came in for an office visit today  Denies pelvic pain fevers chills or any other signs of infection, denies partner change, no concerns for STDs  Medical history reviewed,  perineal hygiene reviewed  Patient advised to stop using over-the-counter products such as deodorant spray, pads, body washes  Perineal hygiene instructions given in after visit summary, patient advised she could try boric acid suppositories as  she is concerned about odor and increased perineal sweating  The following portions of the patient's history were reviewed and updated as appropriate: allergies, current medications, past family history, past medical history, past social history, past surgical history and problem list     Review of Systems   Constitutional: Negative for chills, fatigue and fever  Eyes: Negative for visual disturbance  Respiratory: Negative for cough and shortness of breath  Cardiovascular: Negative for chest pain  Gastrointestinal: Negative for abdominal pain  Genitourinary: Positive for vaginal discharge (Brown spotting)           Objective:      /70 (BP Location: Left arm, Patient Position: Sitting, Cuff Size: Standard)   Ht 5' 3" (1 6 m)   Wt 79 8 kg (176 lb)   BMI 31 18 kg/m²          Physical Exam  Vitals and nursing note reviewed  Constitutional:       Appearance: Normal appearance  She is normal weight  HENT:      Head: Normocephalic and atraumatic  Eyes:      Conjunctiva/sclera: Conjunctivae normal    Cardiovascular:      Rate and Rhythm: Normal rate  Pulmonary:      Effort: Pulmonary effort is normal    Genitourinary:     Exam position: Lithotomy position  Darrin stage (genital): 5  Labia:         Right: Rash present  No tenderness, lesion or injury  Left: Rash present  No tenderness, lesion or injury  Vagina: Vaginal discharge (Josee Flicker discharge) present  Cervix: Normal       Uterus: Normal        Adnexa: Right adnexa normal       Comments: Vulvar contact dermatitis noted  Musculoskeletal:         General: Normal range of motion  Cervical back: Normal range of motion  Lymphadenopathy:      Lower Body: No right inguinal adenopathy  No left inguinal adenopathy  Skin:     General: Skin is warm and dry  Neurological:      Mental Status: She is alert  Psychiatric:         Mood and Affect: Mood normal          Behavior: Behavior normal          Thought Content:  Thought content normal          Judgment: Judgment normal        f/u prn if continued issues

## 2022-01-28 ENCOUNTER — APPOINTMENT (OUTPATIENT)
Dept: PHYSICAL THERAPY | Facility: CLINIC | Age: 47
End: 2022-01-28
Payer: COMMERCIAL

## 2022-02-28 NOTE — PROGRESS NOTES
PT Discharge    Today's date: 2022  Patient name: Coleen Rosenberg  : 1975  MRN: [id number]  Referring provider: Niraj Knight MD  Dx:   Encounter Diagnosis     ICD-10-CM    1  Peroneal tendonitis of left lower leg  M76 72        Assessment  Assessment details: Pt has not attended physical therapy since 22 and has had multiple cancels and no shows since this visit  Subjective and objective information and goals  unable to be updated at this time  Pt DC from skilled therapy  Impairments: abnormal or restricted ROM, activity intolerance, impaired balance, impaired physical strength, lacks appropriate home exercise program and pain with function  Functional limitations: getting up from the floor, prolonged walking/standing  Symptom irritability: lowUnderstanding of Dx/Px/POC: good   Prognosis: good    Goals  STG: 3 weeks  1  Pt will demonstrate independence with HEP  met  2  Pt will improve L ankle A/PROM by at least 10%  met  3  Pt will improve L ankle strength by at least 1/2 grade  met  4  Pt will be able to get up and down from the floor with minimal pain  In progress    LT weeks  1  Pt will improve L ankle to equal that of the R to return to PLOF  met  2  Pt will improve L ankle strength to at least 4+/5  met  3  Pt will be able to get up and down from the floor without pain  In progress  4  Pt will be able to walk for 20 min without ankle pain   In progress      Plan  Patient would benefit from: skilled physical therapy  Planned modality interventions: cryotherapy and thermotherapy: hydrocollator packs  Planned therapy interventions: therapeutic exercise, therapeutic activities, stretching, strengthening, patient education, neuromuscular re-education, massage, manual therapy, balance, gait training and home exercise program  Treatment plan discussed with: patient        Subjective Evaluation    History of Present Illness  Mechanism of injury: Pt reports she feels pain most when she is going up and down stairs multiple times during the day, and it is mostly on the outside of the ankle  She has difficulty sitting on the floor because of the pain, and going on her knees as well  Recurrent probem    Quality of life: good    Pain  At best pain ratin  At worst pain ratin  Quality: dull ache, discomfort and sharp  Aggravating factors: walking, standing, stair climbing and running  Progression: no change    Patient Goals  Patient goals for therapy: increased strength, independence with ADLs/IADLs, return to sport/leisure activities, decreased pain, increased motion and decreased edema          Objective     Tenderness   Left Ankle/Foot   Tenderness in the peroneal tendon  Active Range of Motion   Left Ankle/Foot   Dorsiflexion (ke): 12 degrees   Inversion: 32 degrees   Eversion: 16 degrees     Strength/Myotome Testing     Left Ankle/Foot   Normal strength    Right Ankle/Foot   Normal strength    Swelling   Left Ankle/Foot   Metatarsal heads: 21 3 cm  Figure 8: 50 cm  Malleoli: 22 6 cm    General Comments:       Ankle/Foot Comments   Post VND: 49cm figure 8

## 2022-03-23 ENCOUNTER — TELEPHONE (OUTPATIENT)
Dept: OBGYN CLINIC | Facility: CLINIC | Age: 47
End: 2022-03-23

## 2022-03-23 DIAGNOSIS — R92.8 ABNORMAL MAMMOGRAM OF LEFT BREAST: Primary | ICD-10-CM

## 2022-03-23 NOTE — TELEPHONE ENCOUNTER
This may be related to follow up recommended in mammogram dated a few years ago  Please order the ultrasound and send the script for her

## 2022-03-23 NOTE — TELEPHONE ENCOUNTER
----- Message from Raphael Stubbs sent at 3/23/2022 10:55 AM EDT -----  Regarding: Deirdre Vieyra,    I have an appt this Friday at [facility] in [facility] for a 6month breast ultrasound follow up  They need a prescription from the office  The fax number is   Its for a ultrasound left breast limited     Please confirm when sent  Thank you    Hu Santiago

## 2022-04-08 ENCOUNTER — TELEPHONE (OUTPATIENT)
Dept: OBGYN CLINIC | Facility: HOSPITAL | Age: 47
End: 2022-04-08

## 2022-04-08 NOTE — TELEPHONE ENCOUNTER
Dr Cira Garrison from [facility] office is asking how much Dr Ruslan Linton charges for a narrative report?     Melvin Rodrigues # [phone]

## 2022-05-19 ENCOUNTER — OFFICE VISIT (OUTPATIENT)
Dept: OBGYN CLINIC | Age: 47
End: 2022-05-19
Payer: COMMERCIAL

## 2022-05-19 VITALS
BODY MASS INDEX: 31.18 KG/M2 | HEIGHT: 63 IN | SYSTOLIC BLOOD PRESSURE: 110 MMHG | WEIGHT: 176 LBS | DIASTOLIC BLOOD PRESSURE: 80 MMHG

## 2022-05-19 DIAGNOSIS — N89.8 VAGINAL DISCHARGE: Primary | ICD-10-CM

## 2022-05-19 DIAGNOSIS — N89.8 VAGINAL ODOR: ICD-10-CM

## 2022-05-19 DIAGNOSIS — B37.9 YEAST INFECTION: ICD-10-CM

## 2022-05-19 LAB
BV WHIFF TEST VAG QL: ABNORMAL
CLUE CELLS SPEC QL WET PREP: ABNORMAL
PH SMN: 4.5 [PH]
T VAGINALIS VAG QL WET PREP: ABNORMAL
YEAST VAG QL WET PREP: ABNORMAL

## 2022-05-19 PROCEDURE — 87210 SMEAR WET MOUNT SALINE/INK: CPT

## 2022-05-19 PROCEDURE — 99213 OFFICE O/P EST LOW 20 MIN: CPT

## 2022-05-19 RX ORDER — FLUCONAZOLE 150 MG/1
TABLET ORAL
Qty: 2 TABLET | Refills: 0 | Status: SHIPPED | OUTPATIENT
Start: 2022-05-19 | End: 2022-05-22

## 2022-05-19 NOTE — PATIENT INSTRUCTIONS
Perineal Hygiene     No soaps or feminine wash to the vulva  Use only water to cleanse, or water with Dove or [employer] if necessary  No lotion to the area  Use only coconut oil for moisture if needed   No douching     Cotton underware, loose fitting clothing  Only perfume-free, dye-free laundry detergent, use a second rinse cycle   Avoid fabric softeners/dryer sheets  Coconut oil as a lubricant (if not using condoms) or another scent-free lubricant (Astroglide, Uberlube) if needed  Partner to avoid the same products as well  Over the counter probiotic to restore vaginal mouna may be helpful as well     You may also look into Boric Acid vaginal suppositories to restore vaginal PH balance for up to 2 weeks as directed on the box  You may not use these if you are pregnant     Yeast Infection   AMBULATORY CARE:   A yeast infection , or vaginal candidiasis, is a common vaginal infection  A yeast infection is caused by a fungus, or yeast-like germ  Fungi are normally found in your vagina  Too many fungi can cause an infection  Common signs and symptoms: Thick, white, cheese-like discharge from your vagina    Itching, swelling, and redness in your vagina    Pain or burning when you urinate    Pain during sexual intercourse    Call your doctor or gynecologist if:   You have a fever and chills  You develop abdominal or pelvic pain  Your discharge is bloody and it is not your monthly period  Your signs and symptoms get worse, even after treatment  You have questions or concerns about your condition or care  Treatment for a yeast infection  includes medicines to treat the fungal infection and decrease inflammation  The medicine may be a pill, cream, ointment, or vaginal tablet or suppository  Keep your vagina healthy:   Clean your genital area with mild soap and warm water each day  Do not get soap inside your vagina  Gently dry the area after washing   Do not use hot tubs  The heat and moisture from hot tubs can increase your risk for another yeast infection  Always wipe from front to back  after you use the toilet  This prevents spreading bacteria from your rectal area into your vagina  Do not wear tight-fitting clothes or undergarments  for long periods of time  Wear cotton underwear during the day  Cotton helps keep your genital area dry and does not hold in warmth or moisture  Do not wear underwear at night  Do not douche  or use feminine hygiene sprays or bubble bath  Do not use pads or tampons that are scented, or colored or perfumed toilet paper  Do not have sex until your symptoms go away  Have your partner wear a condom until you complete your course of medication  Ask your healthcare provider about birth control options if necessary  Condoms have latex and diaphragms have gel that kills sperm  Both of these may irritate your genital area  Follow up with your doctor or gynecologist as directed:  Write down your questions so you remember to ask them during your visits  © Copyright [employer] 2022 Information is for End User's use only and may not be sold, redistributed or otherwise used for commercial purposes  All illustrations and images included in CareNotes® are the copyrighted property of [employer] Inc  or [place] Leigh   The above information is an  only  It is not intended as medical advice for individual conditions or treatments  Talk to your doctor, nurse or pharmacist before following any medical regimen to see if it is safe and effective for you

## 2022-05-19 NOTE — PROGRESS NOTES
Diagnoses and all orders for this visit:    Vaginal discharge  -     POCT wet mount    Vaginal odor  -     POCT wet mount    Yeast infection  -     fluconazole (DIFLUCAN) 150 mg tablet; Take 1 tablet by mouth today  May repeat in 3 days      Results for orders placed or performed in visit on 05/19/22   POCT wet mount   Result Value Ref Range    Yeast, Wet Prep Pos     pH 4 5     Whiff Test Neg     Clue Cells Neg     Trich, Wet Prep Neg         Reviewed perineal hygiene and products to avoid  Advised not to wear tight fitting clothing, wipe front to back, do not douche, clean genital area daily with soap and water  Avoid scented body washes, soaps, and lotions in the area  Advised her to only use boric acid as directed  Written information provided in AVS      Call if no symptom improvement, all questions answered, return for annual     Subjective    CC: clear vagina discharge and odor     Lexus Vegas is a 55 y o  female here for vaginal complaints  She states she "just doesn't feel fresh " She reports increased clear vaginal discharge and vaginal odor  She was seen for similar concerns prior and was advised to use boric acid suppositories  She states this helped for about 2 weeks but the symptoms returned  She denies any treatments tried  Denies recent antibiotic use  Denies douching  Denies fever, vaginal itching/irritation, pelvic pain or dyspareunia  Denies new sexual partners  denies urinary symptoms       Patient Active Problem List    Diagnosis Date Noted    Encounter for annual routine gynecological examination 08/10/2021    Closed displaced trimalleolar fracture of left lower leg 02/28/2021    Anxiety associated with depression 09/23/2020     Past Medical History:   Diagnosis Date    Abnormal Pap smear of cervix 2008    Anemia     Anxiety     Gestational diabetes     HPV (human papilloma virus) infection     Insulin controlled White classification A2 gestational diabetes mellitus (GDM) 3/4/2021    Miscarriage     Varicella      Past Surgical History:   Procedure Laterality Date    BUNIONECTOMY Bilateral     BUNIONECTOMY Right 2016     SECTION      [id number]    OTHER SURGICAL HISTORY      IVF egg retrieval     MN  DELIVERY ONLY N/A 2021    Procedure:  SECTION () REPEAT;  Surgeon: Marleni Aguero MD;  [facility];  Service: Obstetrics    MN OPEN TX TRIMALLEOLAR ANKLE FX W FIX PST LIP Left 3/5/2021    Procedure: OPEN REDUCTION W/ INTERNAL FIXATION (ORIF) ANKLE;  Surgeon: Sara Dietz MD;  [facility];  Service: Orthopedics     Social History     Tobacco Use    Smoking status: Never Smoker    Smokeless tobacco: Never Used   Vaping Use    Vaping Use: Never used   Substance Use Topics    Alcohol use: Not Currently     Comment: social    Drug use: Never       Current Outpatient Medications:     buPROPion (WELLBUTRIN XL) 150 mg 24 hr tablet, Take 150 mg by mouth daily at bedtime Takes a total 450 mg every night, Disp: , Rfl:     buPROPion (WELLBUTRIN XL) 300 mg 24 hr tablet, Take 300 mg by mouth daily, Disp: , Rfl:     citalopram (CeleXA) 40 mg tablet, Take 40 mg by mouth daily at bedtime , Disp: , Rfl: 1    Ferrous Sulfate (IRON PO), Take by mouth, Disp: , Rfl:     fluconazole (DIFLUCAN) 150 mg tablet, Take 1 tablet by mouth today   May repeat in 3 days, Disp: 2 tablet, Rfl: 0    ibuprofen (MOTRIN) 800 mg tablet, , Disp: , Rfl:     VITAMIN D PO, Take by mouth, Disp: , Rfl:     triamcinolone (KENALOG) 0 1 % ointment, Apply topically 2 (two) times a day (Patient not taking: Reported on 2022), Disp: 30 g, Rfl: 0    No Known Allergies  OB History    Para Term  AB Living   7 3 3 0 4 3   SAB IAB Ectopic Multiple Live Births   3 1 0 0 3      # Outcome Date GA Lbr Paul/2nd Weight Sex Delivery Anes PTL Lv   7 Term 21 38w0d  3040 g (6 lb 11 2 oz) M CS-LTranv Spinal  JENNIFER   6 SAB 2019 8w0d          5 IAB 2015 6w0d          4 SAB 2011 8w0d          3 Term 02/01/08 39w0d  3515 g (7 lb 12 oz) M CS-LTranv   JENNIFER   2 Term 04/08/05 38w4d  3771 g (8 lb 5 oz) F CS-LTranv   JENNIFER      Complications: Breech presentation   1 SAB 2004 8w0d              Vitals:    05/19/22 0743   BP: 110/80   BP Location: Right arm   Patient Position: Sitting   Cuff Size: Standard   Weight: 79 8 kg (176 lb)   Height: 5' 3" (1 6 m)     Body mass index is 31 18 kg/m²  Review of Systems   Constitutional: Negative for chills, fatigue, fever and unexpected weight change  Respiratory: Negative for shortness of breath  Gastrointestinal: Negative abdominal pain, constipation and diarrhea  Genitourinary: Negative for difficulty urinating, dysuria and hematuria  Physical Exam   Constitutional: Appears well-developed and well-nourished  No distress  Alert and oriented  HENT: Atraumatic, Normocephalic  Conjunctivae clear  Neck: Normal range of motion  Pulmonary: Effort normal   Abdominal: Soft  Negative for pain, tenderness or mass  Musculoskeletal: Normal ROM  Skin: Warm & Dry  Psychological: Normal mood, thought content, behavior & judgement       Pelvic exam was performed with patient supine, lithotomy position  Labia: Right: Negative rash, tenderness, lesion or injury               Left: Negative rash, tenderness, lesion or injury   Urethral meatus:  Negative for  tenderness, inflammation or discharge  Uterus: not deviated, enlarged, fixed or tender  Cervix: No CMT, no discharge or friability  Right adnexa: no mass, no tenderness and no fullness  Left adnexa: no mass, no tenderness and no fullness  Vagina: No erythema, tenderness, masses, or foreign body in the vagina  No signs of injury around the vagina  + moderate amount of white vaginal discharge  Perineum without lesions, signs of injury, erythema or swelling  Inguinal Canal:        Right: No inguinal adenopathy or hernia present          Left: No inguinal adenopathy or hernia present

## 2022-06-16 NOTE — TELEPHONE ENCOUNTER
I called the  back and let them know that we no longer complete narrative reports and they can let me know if they are interested in setting up a deposition

## 2022-06-28 ENCOUNTER — TELEPHONE (OUTPATIENT)
Dept: PSYCHIATRY | Facility: CLINIC | Age: 47
End: 2022-06-28

## 2022-06-28 NOTE — TELEPHONE ENCOUNTER
Pt was added to the wait list for med [facility]  For an evaluation   No referral

## 2022-08-23 ENCOUNTER — ANNUAL EXAM (OUTPATIENT)
Dept: OBGYN CLINIC | Facility: CLINIC | Age: 47
End: 2022-08-23
Payer: COMMERCIAL

## 2022-08-23 VITALS
WEIGHT: 177.8 LBS | DIASTOLIC BLOOD PRESSURE: 84 MMHG | HEIGHT: 63 IN | SYSTOLIC BLOOD PRESSURE: 122 MMHG | BODY MASS INDEX: 31.5 KG/M2

## 2022-08-23 DIAGNOSIS — Z12.31 SCREENING MAMMOGRAM, ENCOUNTER FOR: ICD-10-CM

## 2022-08-23 DIAGNOSIS — Z01.419 ENCOUNTER FOR ANNUAL ROUTINE GYNECOLOGICAL EXAMINATION: Primary | ICD-10-CM

## 2022-08-23 DIAGNOSIS — Z12.11 ENCOUNTER FOR SCREENING COLONOSCOPY: ICD-10-CM

## 2022-08-23 PROCEDURE — 0503F POSTPARTUM CARE VISIT: CPT | Performed by: OBSTETRICS & GYNECOLOGY

## 2022-08-23 PROCEDURE — 99396 PREV VISIT EST AGE 40-64: CPT | Performed by: OBSTETRICS & GYNECOLOGY

## 2022-08-23 RX ORDER — HYDROXYZINE PAMOATE 25 MG/1
CAPSULE ORAL
COMMUNITY
Start: 2022-07-27

## 2022-08-23 NOTE — PROGRESS NOTES
Assessment/Plan:    Encounter for annual routine gynecological examination  Pap/HPV current  Mammogram ordered  Colonoscopy referral given    Encourage healthy diet, exercise, Calcium 1200mg per day and at least 800 iu Vitamin D daily  Diagnoses and all orders for this visit:    Encounter for annual routine gynecological examination    Screening mammogram, encounter for  -     Mammo screening bilateral w 3d & cad; Future    Encounter for screening colonoscopy  -     Ambulatory Referral to Gastroenterology; Future    Other orders  -     hydrOXYzine pamoate (VISTARIL) 25 mg capsule          Subjective:      Patient ID: Levi Steinberg is a 55 y o  female  Patient presents for a routine annual visit  Menarche- 11Y/O  Last Pap Smear- 8/6/19 neg/neg  LMP-8/5/22  Birth control-condoms  Mammogram-10/14/21 order placed  Colonoscopy-referral placed  [id number]  Non smoker  Social drinker  Currently sexually active  No family history of uterine, ovarian, cervical or breast cancer  Patient reports history of yeast infections  Had one at last OV 5/19/22  Does not feel she has one now, just doesn't always feel "fresh"  Reports what she believes to be normal discharge  Gynecologic Exam  She reports no genital itching, genital lesions, genital odor, genital rash, pelvic pain, vaginal bleeding or vaginal discharge  The patient is experiencing no pain  Pertinent negatives include no chills, constipation, diarrhea, fever, nausea, sore throat or vomiting  The following portions of the patient's history were reviewed and updated as appropriate: She  has a past medical history of Abnormal Pap smear of cervix (2008), Anemia, Anxiety, Gestational diabetes, HPV (human papilloma virus) infection, Insulin controlled White classification A2 gestational diabetes mellitus (GDM) (3/4/2021), Miscarriage, and Varicella    She   Patient Active Problem List    Diagnosis Date Noted    Encounter for annual routine gynecological examination 08/10/2021    Closed displaced trimalleolar fracture of left lower leg 2021    Anxiety associated with depression 2020     She  has a past surgical history that includes Bunionectomy (Bilateral, ); Bunionectomy (Right, );  section; Other surgical history; pr open tx trimalleolar ankle fx w fix pst lip (Left, 3/5/2021); and pr  delivery only (N/A, 2021)  Her family history includes Diabetes in her maternal grandfather, maternal grandmother, and mother; Heart disease in her paternal grandfather; Hypertension in her maternal grandmother and mother; No Known Problems in her daughter, sister, and son; Parkinsonism in her paternal grandmother; Prostate cancer in her father; Stroke in her father and paternal grandfather; Thalassemia in her father and sister  She  reports that she has never smoked  She has never used smokeless tobacco  She reports previous alcohol use  She reports that she does not use drugs  Current Outpatient Medications   Medication Sig Dispense Refill    buPROPion (WELLBUTRIN XL) 150 mg 24 hr tablet Take 150 mg by mouth daily at bedtime Takes a total 450 mg every night      buPROPion (WELLBUTRIN XL) 300 mg 24 hr tablet Take 300 mg by mouth daily      citalopram (CeleXA) 40 mg tablet Take 40 mg by mouth daily at bedtime   1    hydrOXYzine pamoate (VISTARIL) 25 mg capsule       ibuprofen (MOTRIN) 800 mg tablet       VITAMIN D PO Take by mouth      Ferrous Sulfate (IRON PO) Take by mouth (Patient not taking: Reported on 2022)      triamcinolone (KENALOG) 0 1 % ointment Apply topically 2 (two) times a day (Patient not taking: No sig reported) 30 g 0     No current facility-administered medications for this visit       Current Outpatient Medications on File Prior to Visit   Medication Sig    buPROPion (WELLBUTRIN XL) 150 mg 24 hr tablet Take 150 mg by mouth daily at bedtime Takes a total 450 mg every night    buPROPion (WELLBUTRIN XL) 300 mg 24 hr tablet Take 300 mg by mouth daily    citalopram (CeleXA) 40 mg tablet Take 40 mg by mouth daily at bedtime     hydrOXYzine pamoate (VISTARIL) 25 mg capsule     ibuprofen (MOTRIN) 800 mg tablet     VITAMIN D PO Take by mouth    Ferrous Sulfate (IRON PO) Take by mouth (Patient not taking: Reported on 8/23/2022)    triamcinolone (KENALOG) 0 1 % ointment Apply topically 2 (two) times a day (Patient not taking: No sig reported)     No current facility-administered medications on file prior to visit  She has No Known Allergies       Review of Systems   Constitutional: Negative for activity change, appetite change, chills, fatigue and fever  HENT: Negative for rhinorrhea, sneezing and sore throat  Eyes: Negative for visual disturbance  Respiratory: Negative for cough, shortness of breath and wheezing  Cardiovascular: Negative for chest pain, palpitations and leg swelling  Gastrointestinal: Negative for abdominal distention, constipation, diarrhea, nausea and vomiting  Genitourinary: Negative for difficulty urinating, pelvic pain and vaginal discharge  Neurological: Negative for syncope and light-headedness  Objective:      /84 (BP Location: Left arm, Patient Position: Sitting, Cuff Size: Standard)   Ht 5' 2 5" (1 588 m)   Wt 80 6 kg (177 lb 12 8 oz)   LMP 08/05/2022 (Exact Date)   BMI 32 00 kg/m²          Physical Exam  Chest:   Breasts: Breasts are symmetrical       Right: No inverted nipple, mass, nipple discharge, skin change or tenderness  Left: No inverted nipple, mass, nipple discharge, skin change or tenderness  Genitourinary:     Labia:         Right: No rash, tenderness, lesion or injury  Left: No rash, tenderness, lesion or injury  Vagina: Normal  No vaginal discharge, tenderness or bleeding  Cervix: No cervical motion tenderness, discharge or friability  Uterus: Not deviated, not enlarged, not fixed and not tender  Adnexa:         Right: No mass, tenderness or fullness  Left: No mass, tenderness or fullness  Neurological:      Mental Status: She is alert and oriented to person, place, and time

## 2022-08-25 NOTE — PATIENT INSTRUCTIONS

## 2023-02-03 ENCOUNTER — TELEPHONE (OUTPATIENT)
Dept: OBGYN CLINIC | Facility: CLINIC | Age: 48
End: 2023-02-03

## 2023-02-03 NOTE — TELEPHONE ENCOUNTER
----- Message from Raphael Stubbs sent at 2/3/2023  2:07 PM EST -----  Regarding: Pelvic Pain  Contact: [phone]  Hi, Mary Carmen España been having a dull pain on my lower left side since yesterday  (On my  scar from 2 yrs ago)  I had this in the past and had a pelvic ultrasound and all was well  Should I get another one?

## 2023-02-03 NOTE — TELEPHONE ENCOUNTER
Pt began with intermittent  lft sided  Pain on her c section incision which she rates as possibly a 5    Last seen 8/23/22  States she had the same symptoms 12/2021 and u/s was normal   lmp 1/20  She will keep a calendar, take tylenol for discomfort and cb after next cycle as may be ovulating now  Admits to have some change in weight

## 2023-02-07 ENCOUNTER — HOSPITAL ENCOUNTER (OUTPATIENT)
Dept: RADIOLOGY | Facility: HOSPITAL | Age: 48
Discharge: HOME | End: 2023-02-07

## 2023-02-07 DIAGNOSIS — R10.2 PERINEAL NEURALGIA, UNSPECIFIED LATERALITY: ICD-10-CM

## 2023-03-29 ENCOUNTER — OFFICE VISIT (OUTPATIENT)
Dept: OBGYN CLINIC | Facility: CLINIC | Age: 48
End: 2023-03-29

## 2023-03-29 VITALS — HEIGHT: 62 IN | DIASTOLIC BLOOD PRESSURE: 64 MMHG | SYSTOLIC BLOOD PRESSURE: 120 MMHG | BODY MASS INDEX: 32.52 KG/M2

## 2023-03-29 DIAGNOSIS — N76.0 ACUTE VAGINITIS: Primary | ICD-10-CM

## 2023-03-29 DIAGNOSIS — B37.49 CANDIDA INFECTION OF GENITAL REGION: ICD-10-CM

## 2023-03-29 PROBLEM — Z01.419 ENCOUNTER FOR ANNUAL ROUTINE GYNECOLOGICAL EXAMINATION: Status: RESOLVED | Noted: 2021-08-10 | Resolved: 2023-03-29

## 2023-03-29 NOTE — PROGRESS NOTES
Diagnoses and all orders for this visit:    Acute vaginitis    Candida infection of genital region  -     terconazole (TERAZOL 7) 0 4 % vaginal cream; Insert 1 applicator into the vagina daily at bedtime        Call if no symptom improvement, all questions answered, return for annual  Vulvar health handout given  Pleasant 52 y o  here for vaginal complaints of irritation, vaginal discharge and odor   She denies any treatments tried other than honeypott  She is borderline prediabetic  She denies recent antibiotic use  She denies douching  She denies fever, pelvic pain or dyspareunia  She denies new sexual partners      Past Medical History:   Diagnosis Date   • Abnormal Pap smear of cervix    • Anemia    • Anxiety    • Gestational diabetes    • HPV (human papilloma virus) infection    • Insulin controlled White classification A2 gestational diabetes mellitus (GDM) 3/4/2021   • Miscarriage    • Varicella      Past Surgical History:   Procedure Laterality Date   • BUNIONECTOMY Bilateral    • BUNIONECTOMY Right    •  SECTION      [id number]   • OTHER SURGICAL HISTORY      IVF egg retrieval    • [place]  DELIVERY ONLY N/A 2021    Procedure:  SECTION () REPEAT;  Surgeon: Milagros Dinero MD;  [facility];  Service: Obstetrics   • MI OPEN TX TRIMALLEOLAR ANKLE FX W/FIXJ PST LIP Left 3/5/2021    Procedure: OPEN REDUCTION W/ INTERNAL FIXATION (ORIF) ANKLE;  Surgeon: Yunier Bob MD;  [facility];  Service: Orthopedics     Social History     Tobacco Use   • Smoking status: Never   • Smokeless tobacco: Never   Vaping Use   • Vaping Use: Never used   Substance Use Topics   • Alcohol use: Not Currently     Comment: social   • Drug use: Never     Family History   Problem Relation Age of Onset   • Diabetes Mother    • Hypertension Mother    • Prostate cancer Father    • Stroke Father    • Thalassemia Father    • Thalassemia Sister    • No Known Problems "Sister    • No Known Problems Daughter    • No Known Problems Son    • Diabetes Maternal Grandmother    • Hypertension Maternal Grandmother    • Diabetes Maternal Grandfather    • Parkinsonism Paternal Grandmother    • Stroke Paternal Grandfather    • Heart disease Paternal Grandfather    • Breast cancer Neg Hx    • Colon cancer Neg Hx    • Ovarian cancer Neg Hx    • Uterine cancer Neg Hx    • Cervical cancer Neg Hx        Current Outpatient Medications:   •  buPROPion (WELLBUTRIN XL) 150 mg 24 hr tablet, Take 150 mg by mouth daily at bedtime Takes a total 450 mg every night, Disp: , Rfl:   •  buPROPion (WELLBUTRIN XL) 300 mg 24 hr tablet, Take 300 mg by mouth daily, Disp: , Rfl:   •  citalopram (CeleXA) 40 mg tablet, Take 40 mg by mouth daily at bedtime , Disp: , Rfl: 1  •  hydrOXYzine pamoate (VISTARIL) 25 mg capsule, , Disp: , Rfl:   •  ibuprofen (MOTRIN) 800 mg tablet, , Disp: , Rfl:   •  terconazole (TERAZOL 7) 0 4 % vaginal cream, Insert 1 applicator into the vagina daily at bedtime, Disp: 45 g, Rfl: 1  •  VITAMIN D PO, Take by mouth, Disp: , Rfl:     No Known Allergies  OB History    Para Term  AB Living   7 3 3 0 4 3   SAB IAB Ectopic Multiple Live Births   3 1 0 0 3      # Outcome Date GA Lbr Paul/2nd Weight Sex Delivery Anes PTL Lv   7 Term 21 38w0d  3040 g (6 lb 11 2 oz) M CS-LTranv Spinal  JENNIFER   6 2019 8w0d          5 IAB  6w0d          4 2011 8w0d          3 Term 08 39w0d  3515 g (7 lb 12 oz) M CS-LTranv   JENNIFER   2 Term 05 38w4d  3771 g (8 lb 5 oz) F CS-LTranv   JENNIFER      Complications: Breech presentation   1 2004 8w0d            Daughter trying for Nursing, wanted [facility] and got [facility]    Vitals:    23 0943   BP: 120/64   Height: 5' 2\" (1 575 m)     Body mass index is 32 52 kg/m²  Patient's last menstrual period was 2023 (approximate)      Review of Systems   Constitutional: Negative for chills, fatigue, fever and unexpected weight " change  Respiratory: Negative for shortness of breath  Gastrointestinal: Negative for anal bleeding, blood in stool, constipation and diarrhea  Genitourinary: Negative for difficulty urinating, dysuria and hematuria  Physical Exam   Constitutional: She appears well-developed and well-nourished  No distress  Alert and oriented  HENT: atraumatic  Head: Normocephalic  Neck: Normal range of motion  Neck supple  Pulmonary: Effort normal   Abdominal: Soft  Pelvic exam was performed with patient supine  No labial fusion  There is no rash, tenderness, lesion or injury on the right labia  There is no rash, tenderness, lesion or injury on the left labia  Urethral meatus does not show any tenderness, inflammation or discharge  Palpation of midline bladder without pain or discomfort  Uterus is not deviated, not enlarged, not fixed and not tender  Cervix exhibits no motion tenderness, no discharge and no friability  Right adnexum displays no mass, no tenderness and no fullness  Left adnexum displays no mass, no tenderness and no fullness  No erythema or tenderness in the vagina  No foreign body in the vagina  No signs of injury around the vagina  MODERATE CURDLIKE Vaginal discharge found  Perineum and anus without areas of injury  No lesions noted or swelling  Lymphadenopathy:        Right: No inguinal adenopathy present  Left: No inguinal adenopathy present

## 2023-04-03 ENCOUNTER — TELEPHONE (OUTPATIENT)
Dept: GASTROENTEROLOGY | Facility: CLINIC | Age: 48
End: 2023-04-03

## 2023-04-03 ENCOUNTER — PREP FOR PROCEDURE (OUTPATIENT)
Dept: GASTROENTEROLOGY | Facility: CLINIC | Age: 48
End: 2023-04-03

## 2023-04-03 DIAGNOSIS — Z12.11 SCREENING FOR COLON CANCER: Primary | ICD-10-CM

## 2023-04-03 NOTE — TELEPHONE ENCOUNTER
Scheduled date of colonoscopy (as of today): 6/12/23     Physician performing colonoscopy: Dr Hemant Quintero    Location of colonoscopy: [place]

## 2023-04-28 ENCOUNTER — OFFICE VISIT (OUTPATIENT)
Dept: OBGYN CLINIC | Facility: CLINIC | Age: 48
End: 2023-04-28

## 2023-04-28 VITALS — HEIGHT: 62 IN | SYSTOLIC BLOOD PRESSURE: 124 MMHG | BODY MASS INDEX: 32.52 KG/M2 | DIASTOLIC BLOOD PRESSURE: 62 MMHG

## 2023-04-28 DIAGNOSIS — B37.49 CANDIDA INFECTION OF GENITAL REGION: ICD-10-CM

## 2023-04-28 LAB
BV WHIFF TEST VAG QL: NEGATIVE
CLUE CELLS SPEC QL WET PREP: NEGATIVE
PH SMN: 4 [PH]
T VAGINALIS VAG QL WET PREP: NEGATIVE
YEAST VAG QL WET PREP: POSITIVE

## 2023-04-28 RX ORDER — FLUCONAZOLE 150 MG/1
150 TABLET ORAL ONCE
Qty: 2 TABLET | Refills: 0 | Status: SHIPPED | OUTPATIENT
Start: 2023-04-28 | End: 2023-04-28

## 2023-04-28 NOTE — PROGRESS NOTES
"Assessment/Plan:    Candida infection of genital region  -reviewed perineal hygiene at length and products to avoid  -recommend daily women's health probiotic  -return if symptoms do not improve or worsen     Diagnoses and all orders for this visit:    Candida infection of genital region  -     terconazole (TERAZOL 7) 0 4 % vaginal cream; Insert 1 applicator into the vagina daily at bedtime  -     fluconazole (DIFLUCAN) 150 mg tablet; Take 1 tablet (150 mg total) by mouth once for 1 dose Repeat dose in 3 days          Subjective:      Patient ID: Humberto Lott is a 52 y o  female presents with persistent vaginal discharge, itching, and odor  States she \"just doesn't feel fresh down there  \" Recently treated for yeast infection last month  States she used the full course of terconazole cream prescribed  Patient's last menstrual period was 2023  The following portions of the patient's history were reviewed and updated as appropriate:   She  has a past medical history of Abnormal Pap smear of cervix (), Anemia, Anxiety, Gestational diabetes, HPV (human papilloma virus) infection, Insulin controlled White classification A2 gestational diabetes mellitus (GDM) (3/4/2021), Miscarriage, and Varicella  She   Patient Active Problem List    Diagnosis Date Noted   • Closed displaced trimalleolar fracture of left lower leg 2021   • Anxiety associated with depression 2020     She  has a past surgical history that includes Bunionectomy (Bilateral, ); Bunionectomy (Right, );  section; Other surgical history; pr open tx trimalleolar ankle fx w/fixj pst lip (Left, 3/5/2021); and pr  delivery only (N/A, 2021)    Her family history includes Diabetes in her maternal grandfather, maternal grandmother, and mother; Heart disease in her paternal grandfather; Hypertension in her maternal grandmother and mother; No Known Problems in her daughter, sister, and son; Parkinsonism in her " "paternal grandmother; Prostate cancer in her father; Stroke in her father and paternal grandfather; Thalassemia in her father and sister  She  reports that she has never smoked  She has never used smokeless tobacco  She reports that she does not currently use alcohol  She reports that she does not use drugs  Current Outpatient Medications   Medication Sig Dispense Refill   • buPROPion (WELLBUTRIN XL) 150 mg 24 hr tablet Take 150 mg by mouth daily at bedtime Takes a total 450 mg every night     • buPROPion (WELLBUTRIN XL) 300 mg 24 hr tablet Take 300 mg by mouth daily     • citalopram (CeleXA) 40 mg tablet Take 40 mg by mouth daily at bedtime   1   • hydrOXYzine pamoate (VISTARIL) 25 mg capsule      • ibuprofen (MOTRIN) 800 mg tablet      • VITAMIN D PO Take by mouth     • terconazole (TERAZOL 7) 0 4 % vaginal cream Insert 1 applicator into the vagina daily at bedtime (Patient not taking: Reported on 4/28/2023) 45 g 1     No current facility-administered medications for this visit  She has No Known Allergies       Review of Systems   Constitutional: Negative for chills and fever  Respiratory: Negative for shortness of breath  Cardiovascular: Negative for chest pain  Gastrointestinal: Negative for abdominal pain  Genitourinary: Positive for vaginal discharge  Negative for difficulty urinating, dyspareunia, dysuria, frequency, genital sores, menstrual problem, pelvic pain, vaginal bleeding and vaginal pain  Musculoskeletal: Negative for back pain and myalgias  Skin: Negative for rash  Neurological: Negative for dizziness and light-headedness  Hematological: Negative for adenopathy  Psychiatric/Behavioral: Negative for confusion  Objective:      /62 (BP Location: Left arm, Patient Position: Sitting, Cuff Size: Large)   Ht 5' 2\" (1 575 m)   LMP 04/07/2023   BMI 32 52 kg/m²        Physical Exam  Vitals and nursing note reviewed     Constitutional:       General: She is not in acute " distress  Appearance: Normal appearance  She is not ill-appearing  HENT:      Head: Normocephalic and atraumatic  Eyes:      Conjunctiva/sclera: Conjunctivae normal    Pulmonary:      Effort: Pulmonary effort is normal    Abdominal:      Palpations: Abdomen is soft  Tenderness: There is no abdominal tenderness  Genitourinary:     General: Normal vulva  Exam position: Lithotomy position  Labia:         Right: No rash, tenderness or lesion  Left: No rash, tenderness or lesion  Urethra: No prolapse, urethral pain, urethral swelling or urethral lesion  Vagina: No signs of injury and foreign body  Vaginal discharge present  No erythema, tenderness, bleeding, lesions or prolapsed vaginal walls  Cervix: No cervical motion tenderness, discharge, friability, lesion, erythema, cervical bleeding or eversion  Musculoskeletal:         General: Normal range of motion  Cervical back: Neck supple  Lymphadenopathy:      Lower Body: No right inguinal adenopathy  No left inguinal adenopathy  Skin:     General: Skin is warm and dry  Neurological:      General: No focal deficit present  Mental Status: She is alert     Psychiatric:         Mood and Affect: Mood normal          Behavior: Behavior normal

## 2023-04-28 NOTE — PATIENT INSTRUCTIONS
Perineal Hygiene     No soaps or feminine wash to the vulva  Use only water to cleanse, or water with Dove or [employer] if necessary  No lotion to the area  Use only coconut oil for moisture if needed   No douching     Cotton underware, loose fitting clothing  Only perfume-free, dye-free laundry detergent, use a second rinse cycle   Avoid fabric softeners/dryer sheets  Coconut oil as a lubricant (if not using condoms) or another scent-free lubricant (Astroglide, Uberlube) if needed  Partner to avoid the same products as well  Over the counter probiotic to restore vaginal mouna may be helpful as well     You may also look into Boric Acid vaginal suppositories to restore vaginal PH balance for up to 2 weeks as directed on the box  You may not use these if you are pregnant     Yeast Infection   AMBULATORY CARE:   A yeast infection , or vaginal candidiasis, is a common vaginal infection  A yeast infection is caused by a fungus, or yeast-like germ  Fungi are normally found in your vagina  Too many fungi can cause an infection  Common signs and symptoms: Thick, white, cheese-like discharge from your vagina    Itching, swelling, and redness in your vagina    Pain or burning when you urinate    Pain during sexual intercourse    Call your doctor or gynecologist if:   You have a fever and chills  You develop abdominal or pelvic pain  Your discharge is bloody and it is not your monthly period  Your signs and symptoms get worse, even after treatment  You have questions or concerns about your condition or care  Treatment for a yeast infection  includes medicines to treat the fungal infection and decrease inflammation  The medicine may be a pill, cream, ointment, or vaginal tablet or suppository  Keep your vagina healthy:   Clean your genital area with mild soap and warm water each day  Do not get soap inside your vagina  Gently dry the area after washing   Do not use hot tubs  The heat and moisture from hot tubs can increase your risk for another yeast infection  Always wipe from front to back  after you use the toilet  This prevents spreading bacteria from your rectal area into your vagina  Do not wear tight-fitting clothes or undergarments  for long periods of time  Wear cotton underwear during the day  Cotton helps keep your genital area dry and does not hold in warmth or moisture  Do not wear underwear at night  Do not douche  or use feminine hygiene sprays or bubble bath  Do not use pads or tampons that are scented, or colored or perfumed toilet paper  Do not have sex until your symptoms go away  Have your partner wear a condom until you complete your course of medication  Ask your healthcare provider about birth control options if necessary  Condoms have latex and diaphragms have gel that kills sperm  Both of these may irritate your genital area  Follow up with your doctor or gynecologist as directed:  Write down your questions so you remember to ask them during your visits  © Copyright [employer] 2022 Information is for End User's use only and may not be sold, redistributed or otherwise used for commercial purposes  The above information is an  only  It is not intended as medical advice for individual conditions or treatments  Talk to your doctor, nurse or pharmacist before following any medical regimen to see if it is safe and effective for you

## 2023-04-28 NOTE — ASSESSMENT & PLAN NOTE
-reviewed perineal hygiene at length and products to avoid  -recommend daily women's health probiotic  -return if symptoms do not improve or worsen

## 2023-06-09 ENCOUNTER — TELEPHONE (OUTPATIENT)
Dept: PSYCHIATRY | Facility: CLINIC | Age: 48
End: 2023-06-09

## 2023-06-09 NOTE — TELEPHONE ENCOUNTER
Per automated system member is eligible for benefits for op mh     partial is covered at 85%  telehealth is covered at 100% with a $20 copay  In office is covered at 85%

## 2023-06-19 ENCOUNTER — DOCUMENTATION (OUTPATIENT)
Dept: BEHAVIORAL/MENTAL HEALTH CLINIC | Facility: CLINIC | Age: 48
End: 2023-06-19

## 2023-06-19 ENCOUNTER — OFFICE VISIT (OUTPATIENT)
Dept: PSYCHIATRY | Facility: CLINIC | Age: 48
End: 2023-06-19
Payer: COMMERCIAL

## 2023-06-19 DIAGNOSIS — F41.1 GENERALIZED ANXIETY DISORDER: Primary | ICD-10-CM

## 2023-06-19 PROCEDURE — 90792 PSYCH DIAG EVAL W/MED SRVCS: CPT | Performed by: NURSE PRACTITIONER

## 2023-06-19 RX ORDER — GABAPENTIN 100 MG/1
100 CAPSULE ORAL 3 TIMES DAILY
Qty: 90 CAPSULE | Refills: 0 | Status: SHIPPED | OUTPATIENT
Start: 2023-06-19

## 2023-06-19 RX ORDER — MULTIVIT-MIN/IRON FUM/FOLIC AC 7.5 MG-4
1 TABLET ORAL DAILY
COMMUNITY
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
## 2023-06-21 ENCOUNTER — OFFICE VISIT (OUTPATIENT)
Age: 48
End: 2023-06-21
Payer: COMMERCIAL

## 2023-06-21 VITALS — SYSTOLIC BLOOD PRESSURE: 104 MMHG | DIASTOLIC BLOOD PRESSURE: 64 MMHG | HEIGHT: 62 IN | BODY MASS INDEX: 32.52 KG/M2

## 2023-06-21 DIAGNOSIS — N90.89 VULVAR IRRITATION: Primary | ICD-10-CM

## 2023-06-21 DIAGNOSIS — N92.0 MENORRHAGIA WITH REGULAR CYCLE: ICD-10-CM

## 2023-06-21 PROCEDURE — 99213 OFFICE O/P EST LOW 20 MIN: CPT

## 2023-06-21 PROCEDURE — 81514 NFCT DS BV&VAGINITIS DNA ALG: CPT

## 2023-06-21 NOTE — PATIENT INSTRUCTIONS
Prophylactic NSAID therapy for Painful or Heavy menses     Ibuprofen or Naproxen (chose 1 or the other, do not take both), Dose as noted on the box  Typically Ibuprofen dose is 600 mg, (3 tablets) every 6-8 hours  Typically Naproxen dose is 500 mg every 12 hours  Start taking medication 2 days prior to onset of menses and continue taking through the first 3 days of menses  Make sure you take consistently this is important  You need to take with food to decrease any gastrointestinal upset effects    This is proven therapy to reduce you flow and cramping by 50 %    Life style changes that have a positive effect on painful and heavy periods are as follows   Daily physical exercise    Increase fiber, fresh fruits and vegetables in your diet    Increase daily water intake    Heating pads(do not apply directly to skin, apply over clothing or towel)   Warm Baths   Relaxation techniques, meditation, massage, yoga and mindfulness     These are all suggestion for improving your sense of frustrations with your menstrual cycle and improving your overall wellness and lifestyle       Perineal Hygiene     No soaps or feminine wash to the vulva  Use only water to cleanse, or water with Dove or [employer] if necessary  No lotion to the area  Use only coconut oil for moisture if needed   No douching     Cotton underware, loose fitting clothing  Only perfume-free, dye-free laundry detergent, use a second rinse cycle   Avoid fabric softeners/dryer sheets  Coconut oil as a lubricant (if not using condoms) or another scent-free lubricant (Astroglide, Uberlube) if needed  Partner to avoid the same products as well  Over the counter probiotic to restore vaginal mouna may be helpful as well     You may also look into Boric Acid vaginal suppositories to restore vaginal PH balance for up to 2 weeks as directed on the box   You may not use these if you are pregnant Name band;

## 2023-06-21 NOTE — ASSESSMENT & PLAN NOTE
-perineal hygiene reviewed  Written information provided  Recommend avoiding irritating products  Keep area clean and dry  May try Aquaphor for vulvar irritation related to chaffing and moisture exposure    -molecular panel collected for recurrent yeast infection

## 2023-06-21 NOTE — ASSESSMENT & PLAN NOTE
-prophylactic NSAIDs recommended for heavy bleeding during first 2 days of cycle    -hand out provided on Mirena IUD

## 2023-06-21 NOTE — PROGRESS NOTES
Assessment/Plan:    Vulvar irritation  -perineal hygiene reviewed  Written information provided  Recommend avoiding irritating products  Keep area clean and dry  May try Aquaphor for vulvar irritation related to chaffing and moisture exposure    -molecular panel collected for recurrent yeast infection  Menorrhagia with regular cycle  -prophylactic NSAIDs recommended for heavy bleeding during first 2 days of cycle    -hand out provided on Mirena IUD  Diagnoses and all orders for this visit:    Vulvar irritation  -     Molecular Vaginal Panel    Menorrhagia with regular cycle          Subjective:      Patient ID: Joselin Florence is a 52 y o  female presents for vulvar/vaginal irritation with walking  She denies vaginal pain, discharge, or itching  States she feels like there is a constant odor from sweating  She has been treated for recurrent yeast infection with terconazole and diflucan with good relief of symptoms  She denies daily use of pads or panty liners, only with her periods  She reports heavy menstrual cycles  Typically regular every 28 days with very heavy flow on days 1-2  Periods do not last for more than 5-7 days  She was told by her primary care doctor to discuss this with her gynecologist and a friend mentioned an IUD for bleeding control  She is not interested in hormonal contraception at this time but remembers her previous gynecologist mentioning ibuprofen for menstrual bleeding  The following portions of the patient's history were reviewed and updated as appropriate:   She  has a past medical history of Abnormal Pap smear of cervix (2008), Anemia, Anxiety, Gestational diabetes, HPV (human papilloma virus) infection, Insulin controlled White classification A2 gestational diabetes mellitus (GDM) (3/4/2021), Miscarriage, and Varicella    She   Patient Active Problem List    Diagnosis Date Noted   • Vulvar irritation 06/21/2023   • Menorrhagia with regular cycle 06/21/2023   • Candida infection of genital region 2023   • Closed displaced trimalleolar fracture of left lower leg 2021   • Anxiety associated with depression 2020     She  has a past surgical history that includes Bunionectomy (Bilateral, ); Bunionectomy (Right, );  section; Other surgical history; pr open tx trimalleolar ankle fx w/fixj pst lip (Left, 3/5/2021); and pr  delivery only (N/A, 2021)  Her family history includes Diabetes in her maternal grandfather, maternal grandmother, and mother; Heart disease in her paternal grandfather; Hypertension in her maternal grandmother and mother; No Known Problems in her daughter, sister, and son; Parkinsonism in her paternal grandmother; Prostate cancer in her father; Stroke in her father and paternal grandfather; Thalassemia in her father and sister  She  reports that she has never smoked  She has never used smokeless tobacco  She reports that she does not currently use alcohol  She reports that she does not use drugs  Current Outpatient Medications   Medication Sig Dispense Refill   • buPROPion (WELLBUTRIN XL) 300 mg 24 hr tablet Take 300 mg by mouth daily     • citalopram (CeleXA) 40 mg tablet Take 40 mg by mouth daily at bedtime   1   • gabapentin (Neurontin) 100 mg capsule Take 1 capsule (100 mg total) by mouth 3 (three) times a day 90 capsule 0   • hydrOXYzine pamoate (VISTARIL) 25 mg capsule Take 25 mg by mouth 2 (two) times a day as needed for anxiety     • ibuprofen (MOTRIN) 800 mg tablet      • Multiple Vitamins-Minerals (multivitamin with minerals) tablet Take 1 tablet by mouth daily     • VITAMIN D PO Take by mouth     • terconazole (TERAZOL 7) 0 4 % vaginal cream Insert 1 applicator into the vagina daily at bedtime (Patient not taking: Reported on 2023) 45 g 1     No current facility-administered medications for this visit  She has No Known Allergies       Review of Systems   Constitutional: Negative for chills "and fever  Gastrointestinal: Negative for abdominal pain  Genitourinary: Negative for dyspareunia, menstrual problem, pelvic pain, vaginal bleeding, vaginal discharge and vaginal pain  +vulvar irritation   Musculoskeletal: Negative for back pain and myalgias  Skin: Negative for rash  Hematological: Negative  Psychiatric/Behavioral: Negative  All other systems reviewed and are negative  Objective:      /64 (BP Location: Left arm, Patient Position: Sitting, Cuff Size: Standard)   Ht 5' 2\" (1 575 m)   LMP 06/01/2023 (Approximate)   BMI 32 52 kg/m²          Physical Exam  Vitals and nursing note reviewed  Constitutional:       General: She is not in acute distress  Appearance: Normal appearance  She is not ill-appearing  HENT:      Head: Normocephalic and atraumatic  Eyes:      Conjunctiva/sclera: Conjunctivae normal    Pulmonary:      Effort: Pulmonary effort is normal    Abdominal:      Palpations: Abdomen is soft  Tenderness: There is no abdominal tenderness  Genitourinary:     General: Normal vulva  Exam position: Lithotomy position  Labia:         Right: No rash, tenderness, lesion or injury  Left: No rash, tenderness, lesion or injury  Vagina: No signs of injury and foreign body  No vaginal discharge, erythema, tenderness, bleeding, lesions or prolapsed vaginal walls  Cervix: No cervical motion tenderness, discharge, friability, lesion, erythema, cervical bleeding or eversion  Musculoskeletal:         General: Normal range of motion  Cervical back: Neck supple  Lymphadenopathy:      Lower Body: No right inguinal adenopathy  No left inguinal adenopathy  Skin:     General: Skin is warm and dry  Neurological:      General: No focal deficit present  Mental Status: She is alert     Psychiatric:         Mood and Affect: Mood normal          Behavior: Behavior normal          "

## 2023-06-22 LAB
C GLABRATA DNA VAG QL NAA+PROBE: NEGATIVE
C KRUSEI DNA VAG QL NAA+PROBE: NEGATIVE
CANDIDA SP 6 PNL VAG NAA+PROBE: NEGATIVE
T VAGINALIS DNA VAG QL NAA+PROBE: NEGATIVE
VAGINOSIS/ITIS DNA PNL VAG PROBE+SIG AMP: NEGATIVE

## 2023-06-23 VITALS — DIASTOLIC BLOOD PRESSURE: 70 MMHG | HEART RATE: 73 BPM | SYSTOLIC BLOOD PRESSURE: 101 MMHG

## 2023-06-28 ENCOUNTER — TELEPHONE (OUTPATIENT)
Dept: OTHER | Facility: OTHER | Age: 48
End: 2023-06-28

## 2023-08-25 ENCOUNTER — TELEPHONE (OUTPATIENT)
Age: 48
End: 2023-08-25

## 2023-08-25 NOTE — TELEPHONE ENCOUNTER
Scheduled date of colonoscopy (as of today):09/22/2023  Physician performing colonoscopy:Dr Putnam  Location of colonoscopy:[place]  Bowel prep reviewed with patient:miralax/dul  Instructions reviewed with patient by:sent via my chart   Clearances: n/a

## 2023-08-25 NOTE — TELEPHONE ENCOUNTER
Patient is currently scheduled for 09/22/2023 with Dr Kerri Moulton but she asked if there is any available appointments on a Saturday?  Please review and reach out thank you

## 2023-09-13 ENCOUNTER — ANNUAL EXAM (OUTPATIENT)
Dept: OBGYN CLINIC | Facility: CLINIC | Age: 48
End: 2023-09-13
Payer: COMMERCIAL

## 2023-09-13 VITALS — BODY MASS INDEX: 32.19 KG/M2 | SYSTOLIC BLOOD PRESSURE: 124 MMHG | WEIGHT: 176 LBS | DIASTOLIC BLOOD PRESSURE: 74 MMHG

## 2023-09-13 DIAGNOSIS — Z01.419 ENCOUNTER FOR GYNECOLOGICAL EXAMINATION (GENERAL) (ROUTINE) WITHOUT ABNORMAL FINDINGS: ICD-10-CM

## 2023-09-13 DIAGNOSIS — Z12.31 SCREENING MAMMOGRAM, ENCOUNTER FOR: ICD-10-CM

## 2023-09-13 DIAGNOSIS — Z01.419 ENCOUNTER FOR ANNUAL ROUTINE GYNECOLOGICAL EXAMINATION: Primary | ICD-10-CM

## 2023-09-13 PROCEDURE — 99396 PREV VISIT EST AGE 40-64: CPT | Performed by: OBSTETRICS & GYNECOLOGY

## 2023-09-13 RX ORDER — SEMAGLUTIDE 0.5 MG/.5ML
INJECTION, SOLUTION SUBCUTANEOUS
COMMUNITY
Start: 2023-09-01

## 2023-09-13 NOTE — ASSESSMENT & PLAN NOTE
Pap/HPV current  Mammogram ordered  Colonoscopy scheduled    Menorrhagia - reviewed ibuprofen, TXA, progestin only med, ablation. Encourage healthy diet, exercise, Calcium 1200mg per day and at least 800 iu Vitamin D daily.

## 2023-09-13 NOTE — PROGRESS NOTES
Assessment/Plan:    Encounter for gynecological examination (general) (routine) without abnormal findings  Pap/HPV current  Mammogram ordered  Colonoscopy scheduled    Menorrhagia - reviewed ibuprofen, TXA, progestin only med, ablation. Encourage healthy diet, exercise, Calcium 1200mg per day and at least 800 iu Vitamin D daily. Diagnoses and all orders for this visit:    Encounter for annual routine gynecological examination    Screening mammogram, encounter for    Encounter for gynecological examination (general) (routine) without abnormal findings    Other orders  -     Wegovy 0.5 MG/0.5ML          Subjective:      Patient ID: Inga Patel is a 52 y.o. female. Patient presents for a routine annual visit  Menarche- Y/O  Last Pap Smear-8/6/19 neg/neg     LMP-8/24/23  Birth control-condom  Mammogram- 9/30/22. Scheduled with [facility] next week  Colonoscopy- scheduled for next week, 9/22  [id number]   Non smoker  Social drinker  Currently sexually active  No family history of uterine, ovarian, cervical or breast cancer    Would like to discuss heavy cycles. Regular with no pain/cramps    Gynecologic Exam  She complains of vaginal bleeding. She reports no genital itching, genital lesions, genital odor, genital rash, pelvic pain or vaginal discharge. This is a recurrent problem. The current episode started more than 1 year ago. The problem occurs intermittently. The problem is unchanged. The patient is experiencing no pain. Pertinent negatives include no chills, constipation, diarrhea, fever, nausea, sore throat or vomiting. The vaginal bleeding is typical of menses. Patient has not been passing clots. Patient has not been passing tissue. Nothing aggravates the symptoms. Past treatments include NSAIDs. The treatment provided mild relief. She is sexually active.        The following portions of the patient's history were reviewed and updated as appropriate:   She  has a past medical history of Abnormal Pap smear of cervix (), Anemia, Anxiety, Gestational diabetes, HPV (human papilloma virus) infection, Insulin controlled White classification A2 gestational diabetes mellitus (GDM) (3/4/2021), Miscarriage, and Varicella. She   Patient Active Problem List    Diagnosis Date Noted   • Vulvar irritation 2023   • Menorrhagia with regular cycle 2023   • Candida infection of genital region 2023   • Closed displaced trimalleolar fracture of left lower leg 2021   • Anxiety associated with depression 2020   • Encounter for gynecological examination (general) (routine) without abnormal findings 2019     She  has a past surgical history that includes Bunionectomy (Bilateral, ); Bunionectomy (Right, );  section; Other surgical history; pr open tx trimalleolar ankle fx w/fixj pst lip (Left, 3/5/2021); and pr  delivery only (N/A, 2021). Her family history includes Diabetes in her maternal grandfather, maternal grandmother, and mother; Heart disease in her paternal grandfather; Hypertension in her maternal grandmother and mother; No Known Problems in her daughter, sister, and son; Parkinsonism in her paternal grandmother; Prostate cancer in her father; Stroke in her father and paternal grandfather; Thalassemia in her father and sister. She  reports that she has never smoked. She has never used smokeless tobacco. She reports that she does not currently use alcohol. She reports that she does not use drugs.   Current Outpatient Medications   Medication Sig Dispense Refill   • buPROPion (WELLBUTRIN XL) 300 mg 24 hr tablet Take 300 mg by mouth daily     • citalopram (CeleXA) 40 mg tablet Take 40 mg by mouth daily at bedtime   1   • hydrOXYzine pamoate (VISTARIL) 25 mg capsule Take 25 mg by mouth 2 (two) times a day as needed for anxiety     • ibuprofen (MOTRIN) 800 mg tablet      • Multiple Vitamins-Minerals (multivitamin with minerals) tablet Take 1 tablet by mouth daily     • VITAMIN D PO Take by mouth     • Wegovy 0.5 MG/0.5ML      • gabapentin (Neurontin) 100 mg capsule Take 1 capsule (100 mg total) by mouth 3 (three) times a day (Patient not taking: Reported on 9/13/2023) 90 capsule 0   • terconazole (TERAZOL 7) 0.4 % vaginal cream Insert 1 applicator into the vagina daily at bedtime (Patient not taking: Reported on 6/19/2023) 45 g 1     No current facility-administered medications for this visit. Current Outpatient Medications on File Prior to Visit   Medication Sig   • buPROPion (WELLBUTRIN XL) 300 mg 24 hr tablet Take 300 mg by mouth daily   • citalopram (CeleXA) 40 mg tablet Take 40 mg by mouth daily at bedtime    • hydrOXYzine pamoate (VISTARIL) 25 mg capsule Take 25 mg by mouth 2 (two) times a day as needed for anxiety   • ibuprofen (MOTRIN) 800 mg tablet    • Multiple Vitamins-Minerals (multivitamin with minerals) tablet Take 1 tablet by mouth daily   • VITAMIN D PO Take by mouth   • Wegovy 0.5 MG/0.5ML    • gabapentin (Neurontin) 100 mg capsule Take 1 capsule (100 mg total) by mouth 3 (three) times a day (Patient not taking: Reported on 9/13/2023)   • terconazole (TERAZOL 7) 0.4 % vaginal cream Insert 1 applicator into the vagina daily at bedtime (Patient not taking: Reported on 6/19/2023)     No current facility-administered medications on file prior to visit. She has No Known Allergies. .    Review of Systems   Constitutional: Negative for activity change, appetite change, chills, fatigue and fever. HENT: Negative for rhinorrhea, sneezing and sore throat. Eyes: Negative for visual disturbance. Respiratory: Negative for cough, shortness of breath and wheezing. Cardiovascular: Negative for chest pain, palpitations and leg swelling. Gastrointestinal: Negative for abdominal distention, constipation, diarrhea, nausea and vomiting. Genitourinary: Negative for difficulty urinating, pelvic pain and vaginal discharge.    Neurological: Negative for syncope and light-headedness. Objective:      /74 (BP Location: Left arm, Patient Position: Sitting, Cuff Size: Standard)   Wt 79.8 kg (176 lb)   LMP 08/24/2023 (Exact Date)   BMI 32.19 kg/m²          Physical Exam  Chest:   Breasts:     Breasts are symmetrical.      Right: No inverted nipple, mass, nipple discharge, skin change or tenderness. Left: No inverted nipple, mass, nipple discharge, skin change or tenderness. Genitourinary:     Labia:         Right: No rash, tenderness, lesion or injury. Left: No rash, tenderness, lesion or injury. Vagina: Normal. No vaginal discharge, tenderness or bleeding. Cervix: No cervical motion tenderness, discharge or friability. Uterus: Not deviated, not enlarged, not fixed and not tender. Adnexa:         Right: No mass, tenderness or fullness. Left: No mass, tenderness or fullness. Neurological:      Mental Status: She is alert and oriented to person, place, and time.

## 2023-11-12 PROBLEM — Z01.419 ENCOUNTER FOR GYNECOLOGICAL EXAMINATION (GENERAL) (ROUTINE) WITHOUT ABNORMAL FINDINGS: Status: RESOLVED | Noted: 2019-08-06 | Resolved: 2023-11-12

## 2024-03-05 ENCOUNTER — TELEPHONE (OUTPATIENT)
Age: 49
End: 2024-03-05

## 2024-03-05 ENCOUNTER — PREP FOR PROCEDURE (OUTPATIENT)
Age: 49
End: 2024-03-05

## 2024-03-05 DIAGNOSIS — Z12.11 SCREENING FOR COLON CANCER: Primary | ICD-10-CM

## 2024-03-05 NOTE — TELEPHONE ENCOUNTER
Scheduled date of colonoscopy (as of today):4/12/2024  Physician performing colonoscopy:   Location of colonoscopy: [facility]  Bowel prep reviewed with patient: Shae Leigh  Instructions reviewed with patient by:N/A  Clearances: N/A

## 2024-03-05 NOTE — TELEPHONE ENCOUNTER
03/05/24  Screened by: Shae Leigh    Referring Provider     Pre- Screening:     There is no height or weight on file to calculate BMI.  Has patient been referred for a routine screening Colonoscopy? yes  Is the patient between 45-75 years old? yes      Previous Colonoscopy no   If yes:    Date: N/A    Facility: N/A    Reason: N/A        Does the patient want to see a Gastroenterologist prior to their procedure OR are they having any GI symptoms? no    Has the patient been hospitalized or had abdominal surgery in the past 6 months? no    Does the patient use supplemental oxygen? no    Does the patient take Coumadin, Lovenox, Plavix, Elliquis, Xarelto, or other blood thinning medication? no    Has the patient had a stroke, cardiac event, or stent placed in the past year? no        If patient is between 45yrs - 49yrs, please advise patient that we will have to confirm benefits & coverage with their insurance company for a routine screening colonoscopy.

## 2024-04-22 ENCOUNTER — TELEPHONE (OUTPATIENT)
Dept: GASTROENTEROLOGY | Facility: CLINIC | Age: 49
End: 2024-04-22

## 2024-04-23 NOTE — TELEPHONE ENCOUNTER
----- Message from Remy Ledezma PA-C sent at 11/8/2019 11:14 AM EST -----  Good morning! I saw that this pt was sched w/ me on Tuesday for family planning - given her history and that she's 40 (advanced maternal age) - we wouldn't do a family planning consult - she needs referral to infertility   i'm happy to order that for her and we can give her info to call and schedule   Thanks! Pt remains safe and free from falls thus far in shift  [place]rence d/c this morning but was retaining so new one placed  NPO at midnight for cystoscopy with Dr. Melendez in morning   Declined working with the[employer]pay today d/t not feeling herself  Thigh high arya hose measures and applied  Cardiology d/c coreg and started metoprolol and aldactone  IV in right arm was leaking and a new ultrasound guided IV done   Holding off on pain medication as the percocet given earlier attending feels could be d/t to the confusion and not feeling well earlier  Discharge planning to home with [facility] once medically stable  Bed locked and lowest position  Bed alarm on for safety  Care ongoing   Problem: Discharge Planning  Goal: Discharge to home or other facility with appropriate resources  4/23/2024 1247 by Jeni Graf RN  Outcome: Progressing     Problem: Safety - Adult  Goal: Free from fall injury  4/23/2024 1247 by Jeni Graf RN  Outcome: Progressing     Problem: Chronic Conditions and Co-morbidities  Goal: Patient's chronic conditions and co-morbidity symptoms are monitored and maintained or improved  4/23/2024 1247 by Jeni Graf RN  Outcome: Progressing     Problem: Skin/Tissue Integrity - Adult  Goal: Skin integrity remains intact  4/23/2024 1247 by Jeni Graf RN  Outcome: Progressing     Problem: Musculoskeletal - Adult  Goal: Return mobility to safest level of function  4/23/2024 1247 by Jeni Graf RN  Outcome: Progressing     Problem: Hematologic - Adult  Goal: Maintains hematologic stability  4/23/2024 1247 by Jeni Graf RN  Outcome: Progressing     Problem: Pain  Goal: Verbalizes/displays adequate comfort level or baseline comfort level  4/23/2024 1247 by Jeni Graf RN  Outcome: Progressing     Problem: Skin/Tissue Integrity  Goal: Absence of new skin breakdown  Description: 1.  Monitor for areas of redness and/or skin breakdown  2.  Assess vascular access sites hourly  3.  Every 4-6 hours minimum:

## 2024-04-30 ENCOUNTER — TELEPHONE (OUTPATIENT)
Age: 49
End: 2024-04-30

## 2024-04-30 NOTE — TELEPHONE ENCOUNTER
Patient calling to reschedule her colonoscopy. Colonoscopy has been reschedule for 5/31/24 with  at [facility]. Patient has prep information.

## 2024-05-13 ENCOUNTER — NURSE TRIAGE (OUTPATIENT)
Age: 49
End: 2024-05-13

## 2024-05-13 NOTE — TELEPHONE ENCOUNTER
"Reports intermittent left intermittent mild pain ongoing for years.  Evaluated by PCP with no findings.  Fleeting pain is unchanged from time of prior u/s imaging.  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  May be related to gas pain or constipation. Continue to monitor, report any sudden changes or increase of symptoms or internval frequency.  If develops severe doubling over type pain, n/v needs ED evaluation. F/U with GYN at next scheduled appointment unless notes any change as discussed above and then call for evaluation.  Patient in agreement to plan.  Reason for Disposition  • Mild abdominal pain    Answer Assessment - Initial Assessment Questions  1. LOCATION: \"Where does it hurt?\"       Left lower quadrant area below incision-like a burning sensation  2. RADIATION: \"Does the pain shoot anywhere else?\" (e.g., chest, back)      Slightly into top of leg  3. ONSET: \"When did the pain begin?\" (e.g., minutes, hours or days ago)       Several months  4. SUDDEN: \"Gradual or sudden onset?\"      Intermittent, gradual  5. PATTERN \"Does the pain come and go, or is it constant?\"     - If constant: \"Is it getting better, staying the same, or worsening?\"       (Note: Constant means the pain never goes away completely; most serious pain is constant and it progresses)      - If intermittent: \"How long does it last?\" \"Do you have pain now?\"      (Note: Intermittent means the pain goes away completely between bouts)      intermittent  6. SEVERITY: \"How bad is the pain?\"  (e.g., Scale 1-10; mild, moderate, or severe)     - MILD (1-3): doesn't interfere with normal activities, abdomen soft and not tender to touch      - MODERATE (4-7): interferes with normal activities or awakens from sleep, tender to touch      - SEVERE (8-10): excruciating pain, doubled over, unable to do any normal activities        Mild to moderate  7. RECURRENT SYMPTOM: \"Have you ever had this type of stomach " "pain before?\" If Yes, ask: \"When was the last time?\" and \"What happened that time?\"       intermittent  8. AGGRAVATING FACTORS: \"Does anything seem to cause this pain?\" (e.g., foods, stress, alcohol)      Holding babies  9. CARDIAC SYMPTOMS: \"Do you have any of the following symptoms: chest pain, difficulty breathing, sweating, nausea?\"      Intermittent gas pain  10. OTHER SYMPTOMS: \"Do you have any other symptoms?\" (e.g., fever, vomiting, diarrhea)        denies  11. PREGNANCY: \"Is there any chance you are pregnant?\" \"When was your last menstrual period?\"        denies    Protocols used: Abdominal Pain - Upper-ADULT-[place]    "

## 2024-05-13 NOTE — TELEPHONE ENCOUNTER
----- Message from Merry Mims sent at 5/13/2024 10:51 AM EDT -----  Patient is having pain inside where she had 3 c-sections. Its a burning feeling

## 2024-05-31 ENCOUNTER — ANESTHESIA EVENT (OUTPATIENT)
Dept: GASTROENTEROLOGY | Facility: HOSPITAL | Age: 49
End: 2024-05-31

## 2024-05-31 ENCOUNTER — HOSPITAL ENCOUNTER (OUTPATIENT)
Dept: GASTROENTEROLOGY | Facility: HOSPITAL | Age: 49
Setting detail: OUTPATIENT SURGERY
End: 2024-05-31
Attending: INTERNAL MEDICINE
Payer: COMMERCIAL

## 2024-05-31 ENCOUNTER — ANESTHESIA (OUTPATIENT)
Dept: GASTROENTEROLOGY | Facility: HOSPITAL | Age: 49
End: 2024-05-31

## 2024-05-31 VITALS
HEIGHT: 62 IN | DIASTOLIC BLOOD PRESSURE: 59 MMHG | WEIGHT: 138.8 LBS | OXYGEN SATURATION: 97 % | HEART RATE: 78 BPM | SYSTOLIC BLOOD PRESSURE: 101 MMHG | TEMPERATURE: 98 F | RESPIRATION RATE: 22 BRPM | BODY MASS INDEX: 25.54 KG/M2

## 2024-05-31 DIAGNOSIS — Z12.11 SCREENING FOR COLON CANCER: ICD-10-CM

## 2024-05-31 LAB
EXT PREGNANCY TEST URINE: NEGATIVE
EXT. CONTROL: NORMAL

## 2024-05-31 PROCEDURE — G0121 COLON CA SCRN NOT HI RSK IND: HCPCS | Performed by: INTERNAL MEDICINE

## 2024-05-31 PROCEDURE — 81025 URINE PREGNANCY TEST: CPT | Performed by: INTERNAL MEDICINE

## 2024-05-31 RX ORDER — SODIUM CHLORIDE, SODIUM LACTATE, POTASSIUM CHLORIDE, CALCIUM CHLORIDE 600; 310; 30; 20 MG/100ML; MG/100ML; MG/100ML; MG/100ML
INJECTION, SOLUTION INTRAVENOUS CONTINUOUS PRN
Status: DISCONTINUED | OUTPATIENT
Start: 2024-05-31 | End: 2024-05-31

## 2024-05-31 RX ORDER — PROPOFOL 10 MG/ML
INJECTION, EMULSION INTRAVENOUS AS NEEDED
Status: DISCONTINUED | OUTPATIENT
Start: 2024-05-31 | End: 2024-05-31

## 2024-05-31 RX ORDER — SODIUM CHLORIDE, SODIUM LACTATE, POTASSIUM CHLORIDE, CALCIUM CHLORIDE 600; 310; 30; 20 MG/100ML; MG/100ML; MG/100ML; MG/100ML
75 INJECTION, SOLUTION INTRAVENOUS CONTINUOUS
Status: DISCONTINUED | OUTPATIENT
Start: 2024-05-31 | End: 2024-06-04 | Stop reason: HOSPADM

## 2024-05-31 RX ADMIN — PROPOFOL 30 MG: 10 INJECTION, EMULSION INTRAVENOUS at 07:31

## 2024-05-31 RX ADMIN — PROPOFOL 50 MG: 10 INJECTION, EMULSION INTRAVENOUS at 07:35

## 2024-05-31 RX ADMIN — PROPOFOL 120 MG: 10 INJECTION, EMULSION INTRAVENOUS at 07:30

## 2024-05-31 RX ADMIN — PROPOFOL 30 MG: 10 INJECTION, EMULSION INTRAVENOUS at 07:39

## 2024-05-31 RX ADMIN — SODIUM CHLORIDE, SODIUM LACTATE, POTASSIUM CHLORIDE, AND CALCIUM CHLORIDE 75 ML/HR: .6; .31; .03; .02 INJECTION, SOLUTION INTRAVENOUS at 07:24

## 2024-05-31 RX ADMIN — SODIUM CHLORIDE, SODIUM LACTATE, POTASSIUM CHLORIDE, AND CALCIUM CHLORIDE: .6; .31; .03; .02 INJECTION, SOLUTION INTRAVENOUS at 07:27

## 2024-05-31 NOTE — ANESTHESIA POSTPROCEDURE EVALUATION
Post-Op Assessment Note    CV Status:  Stable    Pain management: adequate       Mental Status:  Sleepy and arousable   Hydration Status:  Euvolemic   PONV Controlled:  Controlled   Airway Patency:  Patent  Two or more mitigation strategies used for obstructive sleep apnea   Post Op Vitals Reviewed: Yes    No anethesia notable event occurred.    Staff: Anesthesiologist, LEENA               BP (!) 82/52 (05/31/24 0745)    Temp 98 °F (36.7 °C) (05/31/24 0745)    Pulse 78 (05/31/24 0745)   Resp 18 (05/31/24 0745)    SpO2 96 % (05/31/24 0745)

## 2024-05-31 NOTE — ANESTHESIA PREPROCEDURE EVALUATION
Procedure:  COLONOSCOPY    Relevant Problems   NEURO/PSYCH   (+) Anxiety associated with depression        Physical Exam    Airway    Mallampati score: II  TM Distance: >3 FB  Neck ROM: full     Dental   No notable dental hx     Cardiovascular  Rhythm: regular, Rate: normal, Cardiovascular exam normal    Pulmonary  Pulmonary exam normal Breath sounds clear to auscultation    Other Findings  post-pubertal.      Anesthesia Plan  ASA Score- 2     Anesthesia Type- IV sedation with anesthesia with ASA Monitors.         Additional Monitors:     Airway Plan:            Plan Factors-Exercise tolerance (METS): >4 METS.    Chart reviewed. EKG reviewed. Imaging results reviewed. Existing labs reviewed. Patient summary reviewed.    Patient is not a current smoker.  Patient did not smoke on day of surgery.            Induction- intravenous.    Postoperative Plan-     Perioperative Resuscitation Plan - Level 1 - Full Code.       Informed Consent- Anesthetic plan and risks discussed with patient.  I personally reviewed this patient with the CRNA. Discussed and agreed on the Anesthesia Plan with the CRNA..

## 2024-05-31 NOTE — H&P
History and Physical - [facility]  Nati Stubbs 48 y.o. female MRN: [id number]                  HPI: Nati Stubbs is a 48 y.o. year old female who presents for colonoscopy for colon cancer screening      REVIEW OF SYSTEMS: Per the HPI, and otherwise unremarkable.    Historical Information   Past Medical History:   Diagnosis Date    Abnormal Pap smear of cervix     Anemia     Anxiety     Gestational diabetes     HPV (human papilloma virus) infection     Insulin controlled White classification A2 gestational diabetes mellitus (GDM) 3/4/2021    Miscarriage     Varicella      Past Surgical History:   Procedure Laterality Date    BUNIONECTOMY Bilateral     BUNIONECTOMY Right 2016     SECTION      [id number]    OTHER SURGICAL HISTORY      IVF egg retrieval     CT  DELIVERY ONLY N/A 2021    Procedure:  SECTION () REPEAT;  Surgeon: Norma Kendrick MD;  [facility]: [facility];  Service: Obstetrics    CT OPEN TX TRIMALLEOLAR ANKLE FX W/FIXJ PST LIP Left 3/5/2021    Procedure: OPEN REDUCTION W/ INTERNAL FIXATION (ORIF) ANKLE;  Surgeon: James R Lachman, MD;  Location: [facility];  Service: Orthopedics     Social History   Social History     Substance and Sexual Activity   Alcohol Use Never    Comment: social     Social History     Substance and Sexual Activity   Drug Use Never     Social History     Tobacco Use   Smoking Status Never   Smokeless Tobacco Never     Family History   Problem Relation Age of Onset    Diabetes Mother     Hypertension Mother     Prostate cancer Father     Stroke Father     Thalassemia Father     Thalassemia Sister     No Known Problems Sister     No Known Problems Daughter     No Known Problems Son     Diabetes Maternal Grandmother     Hypertension Maternal Grandmother     Diabetes Maternal Grandfather     Parkinsonism Paternal Grandmother     Stroke Paternal Grandfather     Heart disease Paternal Grandfather     Breast cancer  "Neg Hx     Colon cancer Neg Hx     Ovarian cancer Neg Hx     Uterine cancer Neg Hx     Cervical cancer Neg Hx        Meds/Allergies     Not in a hospital admission.    No Known Allergies    Objective     Blood pressure 118/78, pulse 87, temperature 98 °F (36.7 °C), temperature source Temporal, resp. rate 14, height 5' 2\" (1.575 m), weight 63 kg (138 lb 12.8 oz), last menstrual period 05/24/2024, SpO2 99%, not currently breastfeeding.      PHYSICAL EXAM    /78   Pulse 87   Temp 98 °F (36.7 °C) (Temporal)   Resp 14   Ht 5' 2\" (1.575 m)   Wt 63 kg (138 lb 12.8 oz)   LMP 05/24/2024   SpO2 99%   BMI 25.39 kg/m²       Gen: NAD  CV: RRR  CHEST: Clear  ABD: soft, NT/ND  EXT: no edema      ASSESSMENT/PLAN:  This is a 48 y.o. year old female here for colonoscopy, and she is stable and optimized for her procedure.        "

## 2024-06-14 ENCOUNTER — TELEPHONE (OUTPATIENT)
Age: 49
End: 2024-06-14

## 2024-06-14 NOTE — TELEPHONE ENCOUNTER
Patients GI provider:       Number to return call: [phone]    Reason for call: Inocencia from  PCP  office Dr Read  called requesting colonoscopy report be faxed to  [phone].    Scheduled procedure/appointment date if applicable: N/A

## 2024-08-29 ENCOUNTER — OFFICE VISIT (OUTPATIENT)
Dept: OBGYN CLINIC | Facility: CLINIC | Age: 49
End: 2024-08-29
Payer: COMMERCIAL

## 2024-08-29 ENCOUNTER — NURSE TRIAGE (OUTPATIENT)
Age: 49
End: 2024-08-29

## 2024-08-29 VITALS — WEIGHT: 139 LBS | DIASTOLIC BLOOD PRESSURE: 74 MMHG | BODY MASS INDEX: 25.42 KG/M2 | SYSTOLIC BLOOD PRESSURE: 120 MMHG

## 2024-08-29 DIAGNOSIS — B37.49 CANDIDA INFECTION OF GENITAL REGION: Primary | ICD-10-CM

## 2024-08-29 LAB
BV WHIFF TEST VAG QL: ABNORMAL
CLUE CELLS SPEC QL WET PREP: ABNORMAL
SL AMB POCT WET MOUNT: ABNORMAL
T VAGINALIS VAG QL WET PREP: ABNORMAL
YEAST VAG QL WET PREP: ABNORMAL

## 2024-08-29 PROCEDURE — 87210 SMEAR WET MOUNT SALINE/INK: CPT | Performed by: STUDENT IN AN ORGANIZED HEALTH CARE EDUCATION/TRAINING PROGRAM

## 2024-08-29 PROCEDURE — 99213 OFFICE O/P EST LOW 20 MIN: CPT | Performed by: STUDENT IN AN ORGANIZED HEALTH CARE EDUCATION/TRAINING PROGRAM

## 2024-08-29 RX ORDER — FLUCONAZOLE 150 MG/1
150 TABLET ORAL ONCE
Qty: 2 TABLET | Refills: 0 | Status: SHIPPED | OUTPATIENT
Start: 2024-08-29 | End: 2024-08-29

## 2024-08-29 NOTE — PROGRESS NOTES
Ambulatory Visit  Name: Nati Stubbs      : 1975      MRN: [id number]  Encounter Provider: Sharon Huber DO  Encounter Date: 2024   Encounter department: [facility] [place]    Assessment & Plan   1. Candida infection of genital region  Assessment & Plan:  -wet mount positive for yeast   -diflucan sent to pharmacy. Instructions on use provided. Side effects discussed   -follow up prn/annual  Orders:  -     POCT wet mount  -     fluconazole (DIFLUCAN) 150 mg tablet; Take 1 tablet (150 mg total) by mouth once for 1 dose Take on tablet today and one table in 1 week      History of Present Illness     Nati Stubbs is a 48 y.o. female  LMP 24 presents for increased vaginal discharge and a slight odor. Denies vaginal itching or irritation. Also reports a pimple on her right labia minora that she was able to open this morning.     Review of Systems   Constitutional:  Negative for appetite change, chills, fatigue and fever.   Respiratory:  Negative for cough, chest tightness, shortness of breath and wheezing.    Cardiovascular:  Negative for chest pain, palpitations and leg swelling.   Gastrointestinal:  Negative for abdominal distention, abdominal pain, constipation, diarrhea, nausea and vomiting.   Endocrine: Negative for cold intolerance, heat intolerance and polyuria.   Genitourinary:  Positive for vaginal discharge. Negative for difficulty urinating, dyspareunia, dysuria, genital sores, menstrual problem, vaginal bleeding and vaginal pain.   Neurological:  Negative for dizziness, weakness, light-headedness and headaches.       Objective     /74 (BP Location: Right arm, Patient Position: Sitting, Cuff Size: Standard)   Wt 63 kg (139 lb)   LMP 2024 (Exact Date)   BMI 25.42 kg/m²     Physical Exam  Constitutional:       General: She is not in acute distress.     Appearance: Normal appearance. She is not ill-appearing.   Cardiovascular:       Rate and Rhythm: Normal rate.   Pulmonary:      Effort: Pulmonary effort is normal. No respiratory distress.   Abdominal:      General: Abdomen is flat. There is no distension.      Palpations: Abdomen is soft.      Tenderness: There is no abdominal tenderness. There is no guarding or rebound.   Genitourinary:     General: Normal vulva.      Exam position: Lithotomy position.      Labia:         Right: No rash, tenderness, lesion or injury.         Left: No rash, tenderness, lesion or injury.       Vagina: Normal. No foreign body. No vaginal discharge, erythema, tenderness, bleeding or lesions.      Cervix: Normal. No cervical motion tenderness, discharge, friability, lesion, erythema, cervical bleeding or eversion.      Uterus: Normal. Not enlarged, not fixed, not tender and no uterine prolapse.       Adnexa: Right adnexa normal and left adnexa normal.        Right: No mass, tenderness or fullness.          Left: No mass, tenderness or fullness.            Comments: Increased white chunky discharge, small pimple on right labia minora that is likely from small laceration with shaving, healing appropriately   Musculoskeletal:      Right lower leg: No edema.      Left lower leg: No edema.   Neurological:      General: No focal deficit present.      Mental Status: She is alert and oriented to person, place, and time.   Psychiatric:         Mood and Affect: Mood normal.         Behavior: Behavior normal.         Thought Content: Thought content normal.         Judgment: Judgment normal.       Administrative Statements

## 2024-08-29 NOTE — TELEPHONE ENCOUNTER
"Spoke with patient who reports she has had a white discharge for about a  week with irritation.  She reports an odor but states it's not fishy.  She also notes a pimple that popped and would like it looked at.  Appointment made for today. No further questions or concerns at this time.    Reason for Disposition   Patient wants to be seen    Answer Assessment - Initial Assessment Questions  1. DISCHARGE: \"Describe the discharge.\" (e.g., white, yellow, green, gray, foamy, cottage cheese-like)      White discharge  2. ODOR: \"Is there a bad odor?\"      Always has a 'normal' odor   3. ONSET: \"When did the discharge begin?\"      A week ago  4. RASH: \"Is there a rash in that area?\" If Yes, ask: \"Describe it.\" (e.g., redness, blisters, sores, bumps)      denies  5. ABDOMINAL PAIN: \"Are you having any abdominal pain?\" If Yes, ask: \"What does it feel like? \" (e.g., crampy, dull, intermittent, constant)       denies  7. CAUSE: \"What do you think is causing the discharge?\" \"Have you had the same problem before? What happened then?\"      Unsure  8. OTHER SYMPTOMS: \"Do you have any other symptoms?\" (e.g., fever, itching, vaginal bleeding, pain with urination, injury to genital area, vaginal foreign body)      Irritation, and has what looks like a pimple that popped.  9. PREGNANCY: \"Is there any chance you are pregnant?\" \"When was your last menstrual period?\"      LMP 8/11    Protocols used: Vaginal Discharge-ADULT-[place]    "

## 2024-08-29 NOTE — ASSESSMENT & PLAN NOTE
-wet mount positive for yeast   -diflucan sent to pharmacy. Instructions on use provided. Side effects discussed   -follow up prn/annual

## 2024-10-14 ENCOUNTER — ANNUAL EXAM (OUTPATIENT)
Dept: OBGYN CLINIC | Facility: CLINIC | Age: 49
End: 2024-10-14
Payer: COMMERCIAL

## 2024-10-14 VITALS
BODY MASS INDEX: 24.44 KG/M2 | HEIGHT: 62 IN | SYSTOLIC BLOOD PRESSURE: 128 MMHG | WEIGHT: 132.8 LBS | DIASTOLIC BLOOD PRESSURE: 76 MMHG

## 2024-10-14 DIAGNOSIS — Z12.31 ENCOUNTER FOR SCREENING MAMMOGRAM FOR BREAST CANCER: ICD-10-CM

## 2024-10-14 DIAGNOSIS — R92.8 ABNORMAL MAMMOGRAM: ICD-10-CM

## 2024-10-14 DIAGNOSIS — R92.30 DENSE BREAST: ICD-10-CM

## 2024-10-14 DIAGNOSIS — Z01.419 ENCOUNTER FOR ANNUAL ROUTINE GYNECOLOGICAL EXAMINATION: Primary | ICD-10-CM

## 2024-10-14 DIAGNOSIS — Z12.4 CERVICAL CANCER SCREENING: ICD-10-CM

## 2024-10-14 PROCEDURE — G0124 SCREEN C/V THIN LAYER BY MD: HCPCS | Performed by: PATHOLOGY

## 2024-10-14 PROCEDURE — 99396 PREV VISIT EST AGE 40-64: CPT | Performed by: OBSTETRICS & GYNECOLOGY

## 2024-10-14 PROCEDURE — G0476 HPV COMBO ASSAY CA SCREEN: HCPCS | Performed by: OBSTETRICS & GYNECOLOGY

## 2024-10-14 PROCEDURE — G0145 SCR C/V CYTO,THINLAYER,RESCR: HCPCS | Performed by: PATHOLOGY

## 2024-10-14 RX ORDER — CARIPRAZINE 3 MG/1
CAPSULE, GELATIN COATED ORAL
COMMUNITY
Start: 2024-10-08

## 2024-10-14 NOTE — PROGRESS NOTES
Assessment/Plan:   Encounter for annual routine gynecological examination  Pap/HPV collected  Mammogram ordered  Colonoscopy current    Encourage healthy diet, exercise, Calcium 1200mg per day and at least 800 iu Vitamin D daily.       Diagnoses and all orders for this visit:    Encounter for annual routine gynecological examination    Cervical cancer screening  -     Liquid-based pap, screening    Encounter for screening mammogram for breast cancer  -     Cancel: Mammo diagnostic bilateral w 3d and cad; Future    Dense breast    Abnormal mammogram  -     US breast right limited (diagnostic); Future  -     Mammo diagnostic bilateral w 3d and cad; Future    Other orders  -     Vraylar 3 MG capsule          Subjective:     Patient ID: Nati Stubbs is a 49 y.o. female.    Patient presents for a routine annual visit  Menarche- 14Y/O  Last Pap Smear- 19 neg/neg--collect today  LMP- 10/9/24  Birth control-none  Mammogram-23. R US and diag mammo ordered. Scheduled for tomorrow.   Colonoscopy-24 recall 5 years. Dr. Horta    Non smoker  Social drinker  Currently sexually active  No family history of uterine, ovarian, cervical or breast cancer    No concerns/questions for today's visit    Noted a sore on labia minora. Has gotten smaller. On exam, healing inclusion cyst. No action needed.  Menses regular, every 28 d for most part. Lasting 4-5 days, manageable.     Gynecologic Exam  She reports no genital itching, genital lesions, genital odor, genital rash, pelvic pain, vaginal bleeding or vaginal discharge. Pertinent negatives include no chills, constipation, diarrhea, fever, nausea, sore throat or vomiting. She is sexually active. The patient's menstrual history has been regular.     Review of Systems   Constitutional:  Negative for activity change, appetite change, chills, fatigue and fever.   HENT:  Negative for rhinorrhea, sneezing and sore throat.    Eyes:  Negative for visual disturbance.    Respiratory:  Negative for cough, shortness of breath and wheezing.    Cardiovascular:  Negative for chest pain, palpitations and leg swelling.   Gastrointestinal:  Negative for abdominal distention, constipation, diarrhea, nausea and vomiting.   Genitourinary:  Negative for difficulty urinating, pelvic pain and vaginal discharge.   Neurological:  Negative for syncope and light-headedness.         Objective:     Physical Exam  Chest:   Breasts:     Breasts are symmetrical.      Right: No inverted nipple, mass, nipple discharge, skin change or tenderness.      Left: No inverted nipple, mass, nipple discharge, skin change or tenderness.   Genitourinary:     Labia:         Right: No rash, tenderness, lesion or injury.         Left: No rash, tenderness, lesion or injury.       Vagina: Normal. No vaginal discharge, erythema, tenderness or bleeding.      Cervix: No cervical motion tenderness, discharge, friability, erythema or cervical bleeding.      Uterus: Not deviated, not enlarged, not fixed and not tender.       Adnexa:         Right: No mass, tenderness or fullness.          Left: No mass, tenderness or fullness.        Comments: Right labia minora medial upper portion - 0.5mm nodule, not fluctuant. No ulcer. No laceration   Neurological:      Mental Status: She is alert and oriented to person, place, and time.

## 2024-10-15 ENCOUNTER — TELEPHONE (OUTPATIENT)
Age: 49
End: 2024-10-15

## 2024-10-15 NOTE — TELEPHONE ENCOUNTER
Received call from Nati with [facility]. States pt presented with orders for diagnostic mammogram and right breast US limited. However the mammogram does not have the right code, should be R92.8. Reviewed that on our end, that is the code associated. Faxed order to them at [phone].

## 2024-10-23 LAB
LAB AP GYN PRIMARY INTERPRETATION: NORMAL
Lab: NORMAL
PATH INTERP SPEC-IMP: NORMAL

## 2025-01-14 ENCOUNTER — TELEPHONE (OUTPATIENT)
Age: 50
End: 2025-01-14

## 2025-01-15 ENCOUNTER — TELEPHONE (OUTPATIENT)
Dept: OBGYN CLINIC | Facility: CLINIC | Age: 50
End: 2025-01-15

## 2025-01-18 NOTE — TELEPHONE ENCOUNTER
Advised pt call her insurance
Called insurance the patient has No coverage for lovenox, its a plan exclusion, they do cover heparin not sure if that is appropriate
Heparin sent in in place of lovenox
If we need to we can transition her to heparin 5000u BID instead    If we do this I need a cbc the day after she starts to check her platelets
Pt agreed we will wait until tomorrow to see for sure if they deny the approval, but she was also told its a plan exclusion, told her to call on fri to see if there is info   If not she will need new rx and blood work order
None

## 2025-02-14 ENCOUNTER — TELEPHONE (OUTPATIENT)
Age: 50
End: 2025-02-14

## 2025-02-14 NOTE — TELEPHONE ENCOUNTER
Pt is scheduled for an appt in August w/ Dr. Preston Maciel to discuss treatment options for her recurring yeast infections. She is not currently having symptoms but she does get them frequently. Pt would like a sooner available appt. Please follow up if possible.

## 2025-02-17 NOTE — TELEPHONE ENCOUNTER
Spoke to Ms. Stubbs and scheduled her for a sooner appointment with Dr. Norma Maciel.  Nati's appointment is now on Monday, March 31, 2025 @3:15pm.  In addition, Ms. Stubbs has been placed on the wait list as a High Priority.

## 2025-02-19 ENCOUNTER — OFFICE VISIT (OUTPATIENT)
Dept: OBGYN CLINIC | Facility: CLINIC | Age: 50
End: 2025-02-19
Payer: COMMERCIAL

## 2025-02-19 VITALS — DIASTOLIC BLOOD PRESSURE: 68 MMHG | WEIGHT: 136.4 LBS | BODY MASS INDEX: 24.95 KG/M2 | SYSTOLIC BLOOD PRESSURE: 120 MMHG

## 2025-02-19 DIAGNOSIS — N89.8 VAGINAL DISCHARGE: Primary | ICD-10-CM

## 2025-02-19 PROCEDURE — 99213 OFFICE O/P EST LOW 20 MIN: CPT | Performed by: OBSTETRICS & GYNECOLOGY

## 2025-02-19 NOTE — PROGRESS NOTES
Name: Nati Stubbs      : 1975      MRN: [id number]  Encounter Provider: Norma Kendrick MD  Encounter Date: 2025   Encounter department: [facility] [place]  :  Assessment & Plan  Vaginal discharge  Discussion of symptoms over the last year, normal discharge changes related to age, hormonal changes, diet, hydration, stress, any thing that changes vaginal environment. Discussed not using douche, suppositories, vagisil etc.  Warm water, mild soap, epsom salt bathes ok. If vaginal dryness becomes an issue, there are some hypoallergenic suppositories that are ok to use.   On menses today, unable to see if yeast infection present but current symptoms do not support that.    Plan for follow annual.            History of Present Illness   Recurrent yeast symptoms.  Gets intermittent changes in discharge. Rare vaginal irritation. No dryness. No dysuria.   Menses regular. On period at this time.   Has used monitstat without relief.   Sometimes change in odor. No pain.     Gynecologic Exam  She complains of vaginal discharge. This is a recurrent problem. The current episode started more than 1 year ago. The problem occurs intermittently. The problem has been waxing and waning since onset. The patient is experiencing no pain. Pertinent negatives include no chills, constipation, diarrhea, dysuria, fever, flank pain, frequency, hematuria, nausea, painful intercourse, rash, sore throat, urgency or vomiting. The vaginal discharge was mucoid. There has been no bleeding. Patient has not been passing clots. Patient has not been passing tissue. Nothing aggravates the symptoms. Past treatments include antifungals. The treatment provided no relief. She is sexually active.     Nati Stubbs is a 49 y.o. female who presents vaginal symptoms      Review of Systems   Constitutional:  Negative for activity change, appetite change, chills, fatigue and fever.   HENT:  Negative for  rhinorrhea, sneezing and sore throat.    Eyes:  Negative for visual disturbance.   Respiratory:  Negative for cough, shortness of breath and wheezing.    Cardiovascular:  Negative for chest pain, palpitations and leg swelling.   Gastrointestinal:  Negative for abdominal distention, constipation, diarrhea, nausea and vomiting.   Genitourinary:  Positive for vaginal discharge. Negative for difficulty urinating, dysuria, flank pain, frequency, hematuria and urgency.   Skin:  Negative for rash.   Neurological:  Negative for syncope and light-headedness.          Objective   /68 (BP Location: Right arm, Patient Position: Sitting, Cuff Size: Large)   Wt 61.9 kg (136 lb 6.4 oz)   LMP 02/17/2024 (Exact Date)   BMI 24.95 kg/m²      Physical Exam  Genitourinary:     Labia:         Right: No rash, tenderness or lesion.         Left: No rash, tenderness or lesion.       Vagina: Bleeding present. No vaginal discharge, erythema or tenderness.      Cervix: Cervical bleeding present. No discharge.      Adnexa:         Right: No mass, tenderness or fullness.          Left: No mass, tenderness or fullness.

## (undated) DEVICE — CHLORAPREP HI-LITE 26ML ORANGE

## (undated) DEVICE — SUT VICRYL 0 CT-2 18 IN J727D

## (undated) DEVICE — LIGHT HANDLE COVER SLEEVE DISP BLUE STELLAR

## (undated) DEVICE — 3M™ DURAPORE™ SURGICAL TAPE 1538-1, 1 INCH X 10 YARD (2,5CM X 9,1M), 12 ROLLS/BOX: Brand: 3M™ DURAPORE™

## (undated) DEVICE — Device

## (undated) DEVICE — SKIN MARKER DUAL TIP WITH RULER CAP, FLEXIBLE RULER AND LABELS: Brand: DEVON

## (undated) DEVICE — JP 3-SPRING RES W/10FR PVC DRAIN/TR: Brand: CARDINAL HEALTH

## (undated) DEVICE — VIAL DECANTER

## (undated) DEVICE — SYRINGE 20ML LL

## (undated) DEVICE — OVERDRILL AO, DIA3.5MM X 122MM: Brand: VARIAX

## (undated) DEVICE — HOLDING PIN
Type: IMPLANTABLE DEVICE | Site: ANKLE | Status: NON-FUNCTIONAL
Brand: ANCHORAGE
Removed: 2021-03-05

## (undated) DEVICE — DRILL BIT, AO DIA2.6MM X 135MM, SCALED: Brand: VARIAX

## (undated) DEVICE — GLOVE INDICATOR PI UNDERGLOVE SZ 6.5 BLUE

## (undated) DEVICE — ADHESIVE SKIN HIGH VISCOSITY EXOFIN 1ML

## (undated) DEVICE — GLOVE INDICATOR PI UNDERGLOVE SZ 8 BLUE

## (undated) DEVICE — DRESSING XEROFORM 5 X 9

## (undated) DEVICE — SUT ETHILON 3-0 PS-1 18 IN 1663G

## (undated) DEVICE — KIRSCHNER WIRE
Type: IMPLANTABLE DEVICE | Site: ANKLE | Status: NON-FUNCTIONAL
Removed: 2021-03-05

## (undated) DEVICE — SPLINT 5 X 30 IN FAST SET PLASTER

## (undated) DEVICE — PENCIL ELECTROSURG E-Z CLEAN -0035H

## (undated) DEVICE — GAUZE SPONGES,16 PLY: Brand: CURITY

## (undated) DEVICE — COBAN 4 IN STERILE

## (undated) DEVICE — SUT VICRYL 2-0 SH 27 IN UNDYED J417H

## (undated) DEVICE — GLOVE SRG BIOGEL ECLIPSE 6.5

## (undated) DEVICE — DRAPE C-ARMOUR

## (undated) DEVICE — ABDOMINAL PAD: Brand: DERMACEA

## (undated) DEVICE — DRAPE C-ARM X-RAY

## (undated) DEVICE — CAST PADDING 6IN UNSTERILE

## (undated) DEVICE — SUT VICRYL 4-0 PS-2 18 IN J496G

## (undated) DEVICE — PADDING CAST 4 IN  COTTON STRL

## (undated) DEVICE — BETHLEHEM UNIVERSAL  MIONR EXT: Brand: CARDINAL HEALTH

## (undated) DEVICE — NEEDLE BLUNT 18 G X 1 1/2IN

## (undated) DEVICE — COUNTERSINK FOR SCREWS 2.7,3.5MM: Brand: VARIAX

## (undated) DEVICE — SUT VICRYL 0 CTX 36 IN J978H

## (undated) DEVICE — GLOVE SRG BIOGEL 8

## (undated) DEVICE — TELFA NON-ADHERENT ABSORBENT DRESSING: Brand: TELFA

## (undated) DEVICE — PAD CAST 6 IN COTTON NON STERILE

## (undated) DEVICE — DRAPE SHEET THREE QUARTER

## (undated) DEVICE — ACE WRAP 6 IN UNSTERILE

## (undated) DEVICE — SUT VICRYL 0 CT-1 36 IN J946H

## (undated) DEVICE — TUBING SUCTION 5MM X 12 FT

## (undated) DEVICE — SPONGE SCRUB 4 PCT CHLORHEXIDINE

## (undated) DEVICE — BANDAGE, ESMARK LF STR 6"X9' (20/CS): Brand: CYPRESS

## (undated) DEVICE — PACK C-SECTION PBDS

## (undated) DEVICE — INTENDED FOR TISSUE SEPARATION, AND OTHER PROCEDURES THAT REQUIRE A SHARP SURGICAL BLADE TO PUNCTURE OR CUT.: Brand: BARD-PARKER SAFETY BLADES SIZE 15, STERILE